# Patient Record
Sex: FEMALE | Race: WHITE | NOT HISPANIC OR LATINO
[De-identification: names, ages, dates, MRNs, and addresses within clinical notes are randomized per-mention and may not be internally consistent; named-entity substitution may affect disease eponyms.]

---

## 2017-03-30 ENCOUNTER — APPOINTMENT (OUTPATIENT)
Dept: VASCULAR SURGERY | Facility: CLINIC | Age: 47
End: 2017-03-30

## 2017-03-30 VITALS
WEIGHT: 232 LBS | BODY MASS INDEX: 38.65 KG/M2 | HEART RATE: 83 BPM | TEMPERATURE: 98.6 F | SYSTOLIC BLOOD PRESSURE: 110 MMHG | DIASTOLIC BLOOD PRESSURE: 75 MMHG | HEIGHT: 65 IN

## 2017-07-23 ENCOUNTER — INPATIENT (INPATIENT)
Facility: HOSPITAL | Age: 47
LOS: 3 days | Discharge: HOME | DRG: 872 | End: 2017-07-27
Attending: INTERNAL MEDICINE | Admitting: INTERNAL MEDICINE
Payer: COMMERCIAL

## 2017-07-23 VITALS
TEMPERATURE: 99 F | RESPIRATION RATE: 19 BRPM | HEART RATE: 58 BPM | DIASTOLIC BLOOD PRESSURE: 84 MMHG | OXYGEN SATURATION: 100 % | SYSTOLIC BLOOD PRESSURE: 125 MMHG

## 2017-07-23 DIAGNOSIS — K50.00 CROHN'S DISEASE OF SMALL INTESTINE WITHOUT COMPLICATIONS: ICD-10-CM

## 2017-07-23 DIAGNOSIS — Z86.39 PERSONAL HISTORY OF OTHER ENDOCRINE, NUTRITIONAL AND METABOLIC DISEASE: Chronic | ICD-10-CM

## 2017-07-23 DIAGNOSIS — A09 INFECTIOUS GASTROENTERITIS AND COLITIS, UNSPECIFIED: ICD-10-CM

## 2017-07-23 DIAGNOSIS — R10.9 UNSPECIFIED ABDOMINAL PAIN: ICD-10-CM

## 2017-07-23 DIAGNOSIS — K21.9 GASTRO-ESOPHAGEAL REFLUX DISEASE WITHOUT ESOPHAGITIS: ICD-10-CM

## 2017-07-23 DIAGNOSIS — Z98.89 OTHER SPECIFIED POSTPROCEDURAL STATES: Chronic | ICD-10-CM

## 2017-07-23 DIAGNOSIS — A41.9 SEPSIS, UNSPECIFIED ORGANISM: ICD-10-CM

## 2017-07-23 LAB
ALBUMIN SERPL ELPH-MCNC: 4.2 G/DL — SIGNIFICANT CHANGE UP (ref 3.3–5)
ALP SERPL-CCNC: 90 U/L — SIGNIFICANT CHANGE UP (ref 40–120)
ALT FLD-CCNC: 17 U/L RC — SIGNIFICANT CHANGE UP (ref 10–45)
ANION GAP SERPL CALC-SCNC: 16 MMOL/L — SIGNIFICANT CHANGE UP (ref 5–17)
APPEARANCE UR: CLEAR — SIGNIFICANT CHANGE UP
APTT BLD: 30.5 SEC — SIGNIFICANT CHANGE UP (ref 27.5–37.4)
AST SERPL-CCNC: 21 U/L — SIGNIFICANT CHANGE UP (ref 10–40)
BASOPHILS # BLD AUTO: 0 K/UL — SIGNIFICANT CHANGE UP (ref 0–0.2)
BASOPHILS NFR BLD AUTO: 0.4 % — SIGNIFICANT CHANGE UP (ref 0–2)
BILIRUB SERPL-MCNC: 0.4 MG/DL — SIGNIFICANT CHANGE UP (ref 0.2–1.2)
BILIRUB UR-MCNC: NEGATIVE — SIGNIFICANT CHANGE UP
BUN SERPL-MCNC: 10 MG/DL — SIGNIFICANT CHANGE UP (ref 7–23)
C DIFF GDH STL QL: NEGATIVE — SIGNIFICANT CHANGE UP
C DIFF GDH STL QL: SIGNIFICANT CHANGE UP
CALCIUM SERPL-MCNC: 9.2 MG/DL — SIGNIFICANT CHANGE UP (ref 8.4–10.5)
CHLORIDE SERPL-SCNC: 102 MMOL/L — SIGNIFICANT CHANGE UP (ref 96–108)
CO2 SERPL-SCNC: 23 MMOL/L — SIGNIFICANT CHANGE UP (ref 22–31)
COLOR SPEC: YELLOW — SIGNIFICANT CHANGE UP
CREAT SERPL-MCNC: 0.88 MG/DL — SIGNIFICANT CHANGE UP (ref 0.5–1.3)
DIFF PNL FLD: NEGATIVE — SIGNIFICANT CHANGE UP
EOSINOPHIL # BLD AUTO: 0 K/UL — SIGNIFICANT CHANGE UP (ref 0–0.5)
EOSINOPHIL NFR BLD AUTO: 0.2 % — SIGNIFICANT CHANGE UP (ref 0–6)
GAS PNL BLDV: SIGNIFICANT CHANGE UP
GLUCOSE SERPL-MCNC: 122 MG/DL — HIGH (ref 70–99)
GLUCOSE UR QL: NEGATIVE — SIGNIFICANT CHANGE UP
HCT VFR BLD CALC: 42 % — SIGNIFICANT CHANGE UP (ref 34.5–45)
HGB BLD-MCNC: 14.6 G/DL — SIGNIFICANT CHANGE UP (ref 11.5–15.5)
INR BLD: 1.1 RATIO — SIGNIFICANT CHANGE UP (ref 0.88–1.16)
KETONES UR-MCNC: NEGATIVE — SIGNIFICANT CHANGE UP
LEUKOCYTE ESTERASE UR-ACNC: NEGATIVE — SIGNIFICANT CHANGE UP
LIDOCAIN IGE QN: 28 U/L — SIGNIFICANT CHANGE UP (ref 7–60)
LYMPHOCYTES # BLD AUTO: 1.6 K/UL — SIGNIFICANT CHANGE UP (ref 1–3.3)
LYMPHOCYTES # BLD AUTO: 13.4 % — SIGNIFICANT CHANGE UP (ref 13–44)
MAGNESIUM SERPL-MCNC: 1.8 MG/DL — SIGNIFICANT CHANGE UP (ref 1.6–2.6)
MCHC RBC-ENTMCNC: 31 PG — SIGNIFICANT CHANGE UP (ref 27–34)
MCHC RBC-ENTMCNC: 34.7 GM/DL — SIGNIFICANT CHANGE UP (ref 32–36)
MCV RBC AUTO: 89.3 FL — SIGNIFICANT CHANGE UP (ref 80–100)
MONOCYTES # BLD AUTO: 0.6 K/UL — SIGNIFICANT CHANGE UP (ref 0–0.9)
MONOCYTES NFR BLD AUTO: 5 % — SIGNIFICANT CHANGE UP (ref 2–14)
NEUTROPHILS # BLD AUTO: 9.4 K/UL — HIGH (ref 1.8–7.4)
NEUTROPHILS NFR BLD AUTO: 81 % — HIGH (ref 43–77)
NITRITE UR-MCNC: NEGATIVE — SIGNIFICANT CHANGE UP
PH UR: 6 — SIGNIFICANT CHANGE UP (ref 5–8)
PHOSPHATE SERPL-MCNC: 3.4 MG/DL — SIGNIFICANT CHANGE UP (ref 2.5–4.5)
PLATELET # BLD AUTO: 212 K/UL — SIGNIFICANT CHANGE UP (ref 150–400)
POTASSIUM SERPL-MCNC: 3.9 MMOL/L — SIGNIFICANT CHANGE UP (ref 3.5–5.3)
POTASSIUM SERPL-SCNC: 3.9 MMOL/L — SIGNIFICANT CHANGE UP (ref 3.5–5.3)
PROT SERPL-MCNC: 7.2 G/DL — SIGNIFICANT CHANGE UP (ref 6–8.3)
PROT UR-MCNC: SIGNIFICANT CHANGE UP
PROTHROM AB SERPL-ACNC: 11.9 SEC — SIGNIFICANT CHANGE UP (ref 9.8–12.7)
RAPID RVP RESULT: SIGNIFICANT CHANGE UP
RBC # BLD: 4.7 M/UL — SIGNIFICANT CHANGE UP (ref 3.8–5.2)
RBC # FLD: 11.8 % — SIGNIFICANT CHANGE UP (ref 10.3–14.5)
SODIUM SERPL-SCNC: 141 MMOL/L — SIGNIFICANT CHANGE UP (ref 135–145)
SP GR SPEC: 1.02 — SIGNIFICANT CHANGE UP (ref 1.01–1.02)
UROBILINOGEN FLD QL: NEGATIVE — SIGNIFICANT CHANGE UP
WBC # BLD: 11.6 K/UL — HIGH (ref 3.8–10.5)
WBC # FLD AUTO: 11.6 K/UL — HIGH (ref 3.8–10.5)

## 2017-07-23 PROCEDURE — 99285 EMERGENCY DEPT VISIT HI MDM: CPT

## 2017-07-23 PROCEDURE — 99223 1ST HOSP IP/OBS HIGH 75: CPT

## 2017-07-23 PROCEDURE — 74176 CT ABD & PELVIS W/O CONTRAST: CPT | Mod: 26

## 2017-07-23 RX ORDER — PANTOPRAZOLE SODIUM 20 MG/1
40 TABLET, DELAYED RELEASE ORAL
Qty: 0 | Refills: 0 | Status: DISCONTINUED | OUTPATIENT
Start: 2017-07-23 | End: 2017-07-27

## 2017-07-23 RX ORDER — ONDANSETRON 8 MG/1
4 TABLET, FILM COATED ORAL EVERY 6 HOURS
Qty: 0 | Refills: 0 | Status: DISCONTINUED | OUTPATIENT
Start: 2017-07-23 | End: 2017-07-27

## 2017-07-23 RX ORDER — MORPHINE SULFATE 50 MG/1
4 CAPSULE, EXTENDED RELEASE ORAL ONCE
Qty: 0 | Refills: 0 | Status: DISCONTINUED | OUTPATIENT
Start: 2017-07-23 | End: 2017-07-23

## 2017-07-23 RX ORDER — ACETAMINOPHEN 500 MG
1000 TABLET ORAL ONCE
Qty: 0 | Refills: 0 | Status: COMPLETED | OUTPATIENT
Start: 2017-07-23 | End: 2017-07-23

## 2017-07-23 RX ORDER — SODIUM CHLORIDE 9 MG/ML
1000 INJECTION INTRAMUSCULAR; INTRAVENOUS; SUBCUTANEOUS ONCE
Qty: 0 | Refills: 0 | Status: COMPLETED | OUTPATIENT
Start: 2017-07-23 | End: 2017-07-23

## 2017-07-23 RX ORDER — HEPARIN SODIUM 5000 [USP'U]/ML
5000 INJECTION INTRAVENOUS; SUBCUTANEOUS EVERY 12 HOURS
Qty: 0 | Refills: 0 | Status: DISCONTINUED | OUTPATIENT
Start: 2017-07-23 | End: 2017-07-27

## 2017-07-23 RX ORDER — CIPROFLOXACIN LACTATE 400MG/40ML
400 VIAL (ML) INTRAVENOUS EVERY 12 HOURS
Qty: 0 | Refills: 0 | Status: DISCONTINUED | OUTPATIENT
Start: 2017-07-23 | End: 2017-07-27

## 2017-07-23 RX ORDER — ACETAMINOPHEN 500 MG
650 TABLET ORAL EVERY 6 HOURS
Qty: 0 | Refills: 0 | Status: DISCONTINUED | OUTPATIENT
Start: 2017-07-23 | End: 2017-07-27

## 2017-07-23 RX ORDER — METRONIDAZOLE 500 MG
500 TABLET ORAL EVERY 8 HOURS
Qty: 0 | Refills: 0 | Status: DISCONTINUED | OUTPATIENT
Start: 2017-07-23 | End: 2017-07-27

## 2017-07-23 RX ORDER — SODIUM CHLORIDE 9 MG/ML
1000 INJECTION INTRAMUSCULAR; INTRAVENOUS; SUBCUTANEOUS
Qty: 0 | Refills: 0 | Status: DISCONTINUED | OUTPATIENT
Start: 2017-07-23 | End: 2017-07-26

## 2017-07-23 RX ORDER — CIPROFLOXACIN LACTATE 400MG/40ML
400 VIAL (ML) INTRAVENOUS ONCE
Qty: 0 | Refills: 0 | Status: COMPLETED | OUTPATIENT
Start: 2017-07-23 | End: 2017-07-23

## 2017-07-23 RX ORDER — METRONIDAZOLE 500 MG
500 TABLET ORAL ONCE
Qty: 0 | Refills: 0 | Status: COMPLETED | OUTPATIENT
Start: 2017-07-23 | End: 2017-07-23

## 2017-07-23 RX ORDER — SODIUM CHLORIDE 9 MG/ML
3 INJECTION INTRAMUSCULAR; INTRAVENOUS; SUBCUTANEOUS ONCE
Qty: 0 | Refills: 0 | Status: COMPLETED | OUTPATIENT
Start: 2017-07-23 | End: 2017-07-23

## 2017-07-23 RX ADMIN — Medication 100 MILLIGRAM(S): at 19:18

## 2017-07-23 RX ADMIN — SODIUM CHLORIDE 125 MILLILITER(S): 9 INJECTION INTRAMUSCULAR; INTRAVENOUS; SUBCUTANEOUS at 18:56

## 2017-07-23 RX ADMIN — SODIUM CHLORIDE 1000 MILLILITER(S): 9 INJECTION INTRAMUSCULAR; INTRAVENOUS; SUBCUTANEOUS at 15:29

## 2017-07-23 RX ADMIN — SODIUM CHLORIDE 1000 MILLILITER(S): 9 INJECTION INTRAMUSCULAR; INTRAVENOUS; SUBCUTANEOUS at 22:30

## 2017-07-23 RX ADMIN — Medication 200 MILLIGRAM(S): at 16:29

## 2017-07-23 RX ADMIN — Medication 400 MILLIGRAM(S): at 17:30

## 2017-07-23 RX ADMIN — ONDANSETRON 4 MILLIGRAM(S): 8 TABLET, FILM COATED ORAL at 22:30

## 2017-07-23 RX ADMIN — SODIUM CHLORIDE 3 MILLILITER(S): 9 INJECTION INTRAMUSCULAR; INTRAVENOUS; SUBCUTANEOUS at 16:29

## 2017-07-23 RX ADMIN — MORPHINE SULFATE 4 MILLIGRAM(S): 50 CAPSULE, EXTENDED RELEASE ORAL at 22:56

## 2017-07-23 RX ADMIN — Medication 1000 MILLIGRAM(S): at 19:23

## 2017-07-23 RX ADMIN — SODIUM CHLORIDE 1000 MILLILITER(S): 9 INJECTION INTRAMUSCULAR; INTRAVENOUS; SUBCUTANEOUS at 16:29

## 2017-07-23 NOTE — H&P ADULT - PSH
H/O colonoscopy    H/O endoscopy  upper GI  History of cholecystectomy  lap 3/2010  History of pituitary adenoma  s/p resection 2014

## 2017-07-23 NOTE — ED PROVIDER NOTE - MEDICAL DECISION MAKING DETAILS
Dr. Hampton Note: concern for infectious complications with crohn's, check ct abd for abscess, concern for c.dif given sxs and clinda use, check stools and place in isolation

## 2017-07-23 NOTE — H&P ADULT - NSHPREVIEWOFSYSTEMS_GEN_ALL_CORE
REVIEW OF SYSTEMS:    CONSTITUTIONAL: diffuse weakness, +fevers  EYES/ENT: no oral sores/throat pain   RESPIRATORY: No cough, wheezing, hemoptysis; No shortness of breath  CARDIOVASCULAR: No chest pain or palpitations  GASTROINTESTINAL: + diffuse abdominal or epigastric pain. +nausea, No vomiting, or hematemesis;+ diarrhea. No melena or hematochezia.  GENITOURINARY: No dysuria, frequency or hematuria  NEUROLOGICAL: No numbness or focal weakness  MSK: + joint pain (knees and hips)  SKIN: No itching, rashes  PSYCH: No depression

## 2017-07-23 NOTE — H&P ADULT - PMH
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

## 2017-07-23 NOTE — H&P ADULT - HISTORY OF PRESENT ILLNESS
Ms Sanchez is a 47 year old woman with history of Crohn disease (dx 2012 with ileal disease and anal fissure, no surgery, on Remicaid), pituitary adenoma s/p resection 10/14 c/b RUE arterial thrombus s/p thrombectomy, fibromyalgia presents with 1 day fever, abdominal pain, diarrhea. Patient has chronic mild r sided abdominal discomfort but yesterday noted lower abdominal cramping and today noted worsening moderate abdominal tenderness throughout, more so in the LLQ with abdominal fullness but no pain per se when not touched, no alleviating or exacerbating factors. Today she also had fever to 101, 6 episodes of watery diarrhea without blood/melena/mucus. She has nausea without vomiting and reduced PO intake. She also complains of diffuse body aches more so in her back, predominantly left upper back, diffuse knee/hip arthralgias without swelling/redness.   She recently completed a course of clindamycin for 17 days for strep throat.   Her Crohn disease was diagnosed in 2012 and she has been on Remicaid since 2014. She was due to remicaid infusion July 14th but this was skipped due to active strep infection.   In the ED VS: Tmax 101.6, , /81, 100% on RA

## 2017-07-23 NOTE — H&P ADULT - NSHPPHYSICALEXAM_GEN_ALL_CORE
PHYSICAL EXAM:  Vital Signs Last 24 Hrs  T(C): 37.9 (07-23-17 @ 19:19)  T(F): 100.3 (07-23-17 @ 19:19), Max: 101.6 (07-23-17 @ 17:22)  HR: 108 (07-23-17 @ 19:19) (58 - 108)  BP: 129/81 (07-23-17 @ 19:19)  BP(mean): --  RR: 20 (07-23-17 @ 19:19) (19 - 20)  SpO2: 97% (07-23-17 @ 19:19) (97% - 100%)  Wt(kg): --    Constitutional: NAD, awake and alert  EYES: EOMI, PERRL  ENT:  Normal Hearing, no tonsillar exudates, no oral sores  Neck: Soft and supple  Respiratory: Breath sounds are clear bilaterally, No wheezing, rales or rhonchi  Cardiovascular: +tachycardia, S1 and S2, regular rate and rhythm, no Murmurs, gallops or rubs  Gastrointestinal: Bowel Sounds present, soft, mild tenderness throughout, more so in LLQ, nondistended, no guarding, no rebound, negtive [place]  Extremities: No cyanosis or clubbing; warm to touch  Vascular: 2+ peripheral pulses lower ex  Neurological: A/O x 3, no focal deficits, 5/5 strength b/l upper and lower extremities  Musculoskeletal: no joint erythema, swelling, tenderness  Skin: No rashes  Psych: no depression or anhedonia  HEME: no bruises, no nose bleeds

## 2017-07-23 NOTE — H&P ADULT - ASSESSMENT
47 year old woman with Crohn disease presents with abdominal pain, diarrhea, fever after finishing antibiotic course.

## 2017-07-23 NOTE — H&P ADULT - FAMILY HISTORY
<<-----Click on this checkbox to enter Family History Family history of coronary artery disease     Father  Still living? Unknown  Family history of diabetes mellitus, Age at diagnosis: Age Unknown  Family history of hypertension, Age at diagnosis: Age Unknown     Mother  Still living? Unknown  Family history of hypertension, Age at diagnosis: Age Unknown

## 2017-07-23 NOTE — ED PROVIDER NOTE - OBJECTIVE STATEMENT
Dr. Hampton Note: 47F with diffuse ab pain, fever, and worsening diarrhea with watery stools today.  On Remicade for Crohn's and recently completed Clindamycin for strep.  No respiratory sxs, no headache, vomiting, cp, sob.  Onset of fever today, up to 101F, better after Tylenol taken today. Dr. Hampton Note: 47F with diffuse ab pain, fever, and worsening diarrhea with watery stools today.  On Remicade for Crohn's and recently completed Clindamycin for strep.  No respiratory sxs, no headache, vomiting, cp, sob.  Onset of fever today, up to 101F, better after Tylenol taken today.    PCP - Aubrey Ocampo ([facility] [place])

## 2017-07-23 NOTE — ED ADULT NURSE NOTE - PMH
Benign neoplasm of pituitary gland    Breast nodule  right breast   been monitored   yearly sonogram  Crohn disease  dx 2012  Gastro - Esophageal Reflux Disease    H/O hypercholesterolemia  no medications  controlled with diet and exercise  Plantar fasciitis  bilateral  on therapy  Temporal mandibular joint disorder

## 2017-07-23 NOTE — H&P ADULT - PROBLEM SELECTOR PLAN 1
With fever, tachycardia, leukocytosis, most likely 2/2 GI infection in setting of Crohn disease.   -s/p 2L NS, hemodynamically stable  -Less likely to be Crohn flair as she has predominantly ileal disease with R sided discomfort, but today she presents with L sided pain and CT with colonic involvement.  However, may be Crohn flair as she missed a dose of Remicaid. Concern for C dif with IBD and recent abx use. Unfortunately, unable to receive IV contrast to r/o abscess due to allergy.  -s/p cipro/flagyl in the ED, continue for now.   -f/u C dif, stool O&P  -f/u blood cultures  -RVP negative

## 2017-07-24 DIAGNOSIS — A09 INFECTIOUS GASTROENTERITIS AND COLITIS, UNSPECIFIED: ICD-10-CM

## 2017-07-24 LAB
ALBUMIN SERPL ELPH-MCNC: 3.6 G/DL — SIGNIFICANT CHANGE UP (ref 3.3–5)
ALP SERPL-CCNC: 74 U/L — SIGNIFICANT CHANGE UP (ref 40–120)
ALT FLD-CCNC: 11 U/L — SIGNIFICANT CHANGE UP (ref 10–45)
ANION GAP SERPL CALC-SCNC: 16 MMOL/L — SIGNIFICANT CHANGE UP (ref 5–17)
AST SERPL-CCNC: 17 U/L — SIGNIFICANT CHANGE UP (ref 10–40)
BASOPHILS # BLD AUTO: 0.01 K/UL — SIGNIFICANT CHANGE UP (ref 0–0.2)
BASOPHILS NFR BLD AUTO: 0.1 % — SIGNIFICANT CHANGE UP (ref 0–2)
BILIRUB SERPL-MCNC: 0.4 MG/DL — SIGNIFICANT CHANGE UP (ref 0.2–1.2)
BUN SERPL-MCNC: 6 MG/DL — LOW (ref 7–23)
CALCIUM SERPL-MCNC: 8.2 MG/DL — LOW (ref 8.4–10.5)
CHLORIDE SERPL-SCNC: 106 MMOL/L — SIGNIFICANT CHANGE UP (ref 96–108)
CO2 SERPL-SCNC: 21 MMOL/L — LOW (ref 22–31)
CREAT SERPL-MCNC: 0.81 MG/DL — SIGNIFICANT CHANGE UP (ref 0.5–1.3)
CRP SERPL-MCNC: 1.1 MG/DL — HIGH (ref 0–0.4)
EOSINOPHIL # BLD AUTO: 0 K/UL — SIGNIFICANT CHANGE UP (ref 0–0.5)
EOSINOPHIL NFR BLD AUTO: 0 % — SIGNIFICANT CHANGE UP (ref 0–6)
FOLATE SERPL-MCNC: >20 NG/ML — SIGNIFICANT CHANGE UP (ref 4.8–24.2)
GLUCOSE SERPL-MCNC: 106 MG/DL — HIGH (ref 70–99)
HCT VFR BLD CALC: 35.8 % — SIGNIFICANT CHANGE UP (ref 34.5–45)
HGB BLD-MCNC: 12.1 G/DL — SIGNIFICANT CHANGE UP (ref 11.5–15.5)
IMM GRANULOCYTES NFR BLD AUTO: 0.2 % — SIGNIFICANT CHANGE UP (ref 0–1.5)
LACTATE SERPL-SCNC: 0.8 MMOL/L — SIGNIFICANT CHANGE UP (ref 0.7–2)
LYMPHOCYTES # BLD AUTO: 1.07 K/UL — SIGNIFICANT CHANGE UP (ref 1–3.3)
LYMPHOCYTES # BLD AUTO: 11 % — LOW (ref 13–44)
MAGNESIUM SERPL-MCNC: 1.6 MG/DL — SIGNIFICANT CHANGE UP (ref 1.6–2.6)
MCHC RBC-ENTMCNC: 29.1 PG — SIGNIFICANT CHANGE UP (ref 27–34)
MCHC RBC-ENTMCNC: 33.8 GM/DL — SIGNIFICANT CHANGE UP (ref 32–36)
MCV RBC AUTO: 86.1 FL — SIGNIFICANT CHANGE UP (ref 80–100)
MONOCYTES # BLD AUTO: 0.77 K/UL — SIGNIFICANT CHANGE UP (ref 0–0.9)
MONOCYTES NFR BLD AUTO: 7.9 % — SIGNIFICANT CHANGE UP (ref 2–14)
NEUTROPHILS # BLD AUTO: 7.85 K/UL — HIGH (ref 1.8–7.4)
NEUTROPHILS NFR BLD AUTO: 80.8 % — HIGH (ref 43–77)
PHOSPHATE SERPL-MCNC: 2.6 MG/DL — SIGNIFICANT CHANGE UP (ref 2.5–4.5)
PLATELET # BLD AUTO: 171 K/UL — SIGNIFICANT CHANGE UP (ref 150–400)
POTASSIUM SERPL-MCNC: 3.7 MMOL/L — SIGNIFICANT CHANGE UP (ref 3.5–5.3)
POTASSIUM SERPL-SCNC: 3.7 MMOL/L — SIGNIFICANT CHANGE UP (ref 3.5–5.3)
PROT SERPL-MCNC: 6.4 G/DL — SIGNIFICANT CHANGE UP (ref 6–8.3)
RBC # BLD: 4.16 M/UL — SIGNIFICANT CHANGE UP (ref 3.8–5.2)
RBC # FLD: 13.2 % — SIGNIFICANT CHANGE UP (ref 10.3–14.5)
SODIUM SERPL-SCNC: 143 MMOL/L — SIGNIFICANT CHANGE UP (ref 135–145)
VIT B12 SERPL-MCNC: 402 PG/ML — SIGNIFICANT CHANGE UP (ref 243–894)
WBC # BLD: 9.72 K/UL — SIGNIFICANT CHANGE UP (ref 3.8–10.5)
WBC # FLD AUTO: 9.72 K/UL — SIGNIFICANT CHANGE UP (ref 3.8–10.5)

## 2017-07-24 RX ORDER — POTASSIUM CHLORIDE 20 MEQ
20 PACKET (EA) ORAL ONCE
Qty: 0 | Refills: 0 | Status: COMPLETED | OUTPATIENT
Start: 2017-07-24 | End: 2017-07-24

## 2017-07-24 RX ADMIN — ONDANSETRON 4 MILLIGRAM(S): 8 TABLET, FILM COATED ORAL at 13:04

## 2017-07-24 RX ADMIN — MORPHINE SULFATE 4 MILLIGRAM(S): 50 CAPSULE, EXTENDED RELEASE ORAL at 00:37

## 2017-07-24 RX ADMIN — Medication 100 MILLIGRAM(S): at 07:10

## 2017-07-24 RX ADMIN — Medication 650 MILLIGRAM(S): at 00:38

## 2017-07-24 RX ADMIN — Medication 650 MILLIGRAM(S): at 19:51

## 2017-07-24 RX ADMIN — Medication 100 MILLIGRAM(S): at 15:07

## 2017-07-24 RX ADMIN — ONDANSETRON 4 MILLIGRAM(S): 8 TABLET, FILM COATED ORAL at 19:05

## 2017-07-24 RX ADMIN — Medication 650 MILLIGRAM(S): at 13:04

## 2017-07-24 RX ADMIN — ONDANSETRON 4 MILLIGRAM(S): 8 TABLET, FILM COATED ORAL at 06:50

## 2017-07-24 RX ADMIN — Medication 650 MILLIGRAM(S): at 06:55

## 2017-07-24 RX ADMIN — Medication 20 MILLIEQUIVALENT(S): at 17:42

## 2017-07-24 RX ADMIN — Medication 100 MILLIGRAM(S): at 17:42

## 2017-07-24 RX ADMIN — HEPARIN SODIUM 5000 UNIT(S): 5000 INJECTION INTRAVENOUS; SUBCUTANEOUS at 17:42

## 2017-07-24 RX ADMIN — SODIUM CHLORIDE 125 MILLILITER(S): 9 INJECTION INTRAMUSCULAR; INTRAVENOUS; SUBCUTANEOUS at 07:15

## 2017-07-24 RX ADMIN — PANTOPRAZOLE SODIUM 40 MILLIGRAM(S): 20 TABLET, DELAYED RELEASE ORAL at 10:19

## 2017-07-24 RX ADMIN — Medication 100 MILLIGRAM(S): at 05:25

## 2017-07-24 RX ADMIN — SODIUM CHLORIDE 125 MILLILITER(S): 9 INJECTION INTRAMUSCULAR; INTRAVENOUS; SUBCUTANEOUS at 17:43

## 2017-07-24 NOTE — CONSULT NOTE ADULT - PROBLEM SELECTOR RECOMMENDATION 9
cdiff neg  would continue cipro and flagyl   follow stool cultures  GI follow up.  follow up blood cultures

## 2017-07-24 NOTE — ED ADULT NURSE REASSESSMENT NOTE - NS ED NURSE REASSESS COMMENT FT1
Report received from Dominga KOVACS. Pt AAOx4, NAD, resp nonlabored, skin warm/dry, resting comfortably in bed. Pt c/o mild nausea, no vomiting. Pt has only had one episode of loose stool in ED, approx 2 hours ago, no diarrhea at this time. Pt reports feeling mild generalized weakness, improved from before. No abdominal pain, pt tolerating PO water and ice chips. Pt denies headache, dizziness, chest pain, palpitations, SOB, abd pain, v/d, urinary symptoms, fevers, chills at this time. Pt awaiting bed. Safety maintained. Report received from Dominga KOVACS. Pt AAOx4, NAD, resp nonlabored, skin warm/dry, resting comfortably in bed. Pt c/o mild nausea, no vomiting. Pt has only had one episode of loose stool in ED, approx 2 hours ago, no diarrhea at this time. Pt reports feeling mild generalized weakness, improved from before. No abdominal pain, pt tolerating PO water and ice chips. Pt denies headache, dizziness, chest pain, palpitations, SOB, abd pain, v/d, urinary symptoms, fevers, chills at this time. Pt awaiting bed. Safety maintained.    0215: No changes observed, pt asleep in room with eyes closed, arouses to voice, AAOx4, NAD, resting comfortably in bed, skin warm/dry, pt awaiting bed, comfort measures provided. Report given to ED Holding BETHANIE Pizarro. Safety maintained.

## 2017-07-24 NOTE — CONSULT NOTE ADULT - SUBJECTIVE AND OBJECTIVE BOX
SUBJECTIVE:  47yFemale with known Crohn's ileitis on Remicade monotherapy every 8 weeks now p/w 48 hours of profuse diarrhea, vomiting, fever to 101 at home.  No known sick contacts but ate out Friday and Saturday.  Recently on antibiotics for Strep throat (which also required her to miss last Remicade infusion).  No blood in the stool.  No extra-intestinal IBD manifestations.  No weight loss, fatigue, joint pains, rashes.      ______________________________________________________________________  PMH/PSH:  PAST MEDICAL & SURGICAL HISTORY:  Thrombus due to any device, implant or graft, sequela: RUE arterial thrombus, complication of pituitary adenoma resection 2014, s/p thrombectomy at the time  Fibromyalgia  Temporal mandibular joint disorder  Plantar fasciitis: bilateral  on therapy  Breast nodule: right breast   been monitored   yearly sonogram  H/O hypercholesterolemia: no medications  controlled with diet and exercise  Crohn disease: dx 2012  Benign neoplasm of pituitary gland  Gastro - Esophageal Reflux Disease  History of pituitary adenoma: s/p resection 2014  H/O endoscopy: upper GI  H/O colonoscopy  History of cholecystectomy: chapis 3/2010    ______________________________________________________________________  MEDS:  MEDICATIONS  (STANDING):  sodium chloride 0.9%. 1000 milliLiter(s) (125 mL/Hr) IV Continuous <Continuous>  pantoprazole    Tablet 40 milliGRAM(s) Oral before breakfast  heparin  Injectable 5000 Unit(s) SubCutaneous every 12 hours  sodium chloride 0.9%. 1000 milliLiter(s) (1000 mL/Hr) IV Continuous <Continuous>  ciprofloxacin   IVPB 400 milliGRAM(s) IV Intermittent every 12 hours  metroNIDAZOLE  IVPB 500 milliGRAM(s) IV Intermittent every 8 hours    MEDICATIONS  (PRN):  acetaminophen   Tablet 650 milliGRAM(s) Oral every 6 hours PRN For Temp greater than 38 C (100.4 F)  ondansetron Injectable 4 milliGRAM(s) IV Push every 6 hours PRN Nausea    ______________________________________________________________________  ALL:   Allergies    Apriso (Fever)  Asacol (Fever)  ceftin and medications she does not know what name is (Unknown)  contrast media (iodine-based) (Other)  Medrol Dosepak (Other)    Intolerances    adhesives (Other)    ______________________________________________________________________  SH:  ______________________________________________________________________  FH:  FAMILY HISTORY:  Family history of hypertension (Father, Mother)  Family history of diabetes mellitus (Father)  Family history of coronary artery disease    ______________________________________________________________________  ROS:    CONSTITUTIONAL:  No weight loss, fever, chills, weakness or fatigue.    HEENT:  Eyes:  No visual loss, blurred vision, double vision or yellow sclerae. Ears, Nose, Throat:  No hearing loss, sneezing, congestion, runny nose or sore throat.    SKIN:  No rash or itching.    CARDIOVASCULAR:  No chest pain, chest pressure or chest discomfort. No palpitations or edema.    RESPIRATORY:  No shortness of breath, cough or sputum.    GASTROINTESTINAL:  SEE HPI    GENITOURINARY:  No dysuria, hematuria, urinary frequency    NEUROLOGICAL:  No headache, dizziness, syncope, paralysis, ataxia, numbness or tingling in the extremities. No change in bowel or bladder control.    MUSCULOSKELETAL:  No muscle, back pain, joint pain or stiffness.    HEMATOLOGIC:  No anemia, bleeding or bruising.    LYMPHATICS:  No enlarged nodes. No history of splenectomy.    PSYCHIATRIC:  No history of depression or anxiety.    ENDOCRINOLOGIC:  No reports of sweating, cold or heat intolerance. No polyuria or polydipsia.    ALLERGIES:  No history of asthma, hives, eczema or rhinitis.  ______________________________________________________________________  PHYSICAL EXAM:  T(C): 37 (07-24-17 @ 18:08), Max: 39.1 (07-24-17 @ 00:39)  HR: 86 (07-24-17 @ 18:08)  BP: 111/75 (07-24-17 @ 18:08)  RR: 18 (07-24-17 @ 18:08)  SpO2: 99% (07-24-17 @ 18:08)  Wt(kg): --    07-24 - 07-24  --------------------------------------------------------  IN:    Oral Fluid: 120 mL    sodium chloride 0.9%.: 250 mL  Total IN: 370 mL    OUT:  Total OUT: 0 mL    Total NET: 370 mL        PHYSICAL EXAM:      Constitutional: NAD, anxious    Eyes: PERRLA    ENMT: MMM    Neck: no LAD/JVD    Back: no CVAT    Respiratory: CTA    Cardiovascular: RR, S1/S2 normal    Gastrointestinal: soft, ND, +diffuse tenderness without rebound or guarding, no HSM    Extremities: no C/C    Skin: no rashes    Lymph Nodes: no LAD    Psychiatric: A&O x 3      ______________________________________________________________________  LABS:                        12.1   9.72  )-----------( 171      ( 24 Jul 2017 07:46 )             35.8     07-24    143  |  106  |  6<L>  ----------------------------<  106<H>  3.7   |  21<L>  |  0.81    Ca    8.2<L>      24 Jul 2017 07:41  Phos  2.6     07-24  Mg     1.6     07-24    TPro  6.4  /  Alb  3.6  /  TBili  0.4  /  DBili  x   /  AST  17  /  ALT  11  /  AlkPhos  74  07-24    LIVER FUNCTIONS - ( 24 Jul 2017 07:41 )  Alb: 3.6 g/dL / Pro: 6.4 g/dL / ALK PHOS: 74 U/L / ALT: 11 U/L / AST: 17 U/L / GGT: x         C.diff PCR NEGATIVE      ______________________________________________________________________  IMAGING:  PROCEDURE DATE:  07/23/2017      INTERPRETATION:  CLINICAL INFORMATION: History of Crohn's. Abdominal   pain, fever and diarrhea.    COMPARISON: CT Abdomen/pelvis on 6/7/2014.    PROCEDURE:   CT of the Abdomen and Pelvis was performed without intravenous contrast.   Intravenous contrast: None.  Oral contrast: positive contrast was administered.  Sagittal and coronal reformats were performed.    FINDINGS:     LOWER CHEST: Within normal limits.    Evaluation of the abdominal viscera is limited without IV contrast.    LIVER: Subcentimeter hypodense lesion too small to characterize. Normal   morphology.  BILE DUCTS: Normal caliber.  GALLBLADDER: Status post cholecystectomy.  SPLEEN: Within normal limits, splenule noted.  PANCREAS: Within normal limits.  ADRENALS: Within normal limits.  KIDNEYS/URETERS: Within normal limits.    BLADDER: Within normal limits.  REPRODUCTIVE ORGANS: The uterus and adnexa are within normal limits.    BOWEL: There is wall thickening with submucosal edema with minimal fat   stranding involving the ascending colon. No bowel obstruction. Appendix   is normal.  PERITONEUM: No ascites.  VESSELS:  Within normal limits.  RETROPERITONEUM: Multiple subcentimeter mesenteric and retroperitoneal   lymph nodes. No abscess collection.   ABDOMINAL WALL: The uterus and right ovary appear unremarkable. There is   a 2.2 cm left-sided ovarian cyst.  BONES: Within normal limits.    IMPRESSION: Limited evaluation without IV contrast.    Mild right-sided colitis with multiple subcentimeter   mesenteric/retroperitoneal lymph nodes, likely reactive. Given the   patient's history of Crohn's disease, this is consistent with an acute   flare.    No intra-abdominal abscess collection.      ______________________________________________________________________  ASSESSMENT:  47y Female WITH Crohn's ileitis on Remicade now p/w fever, vomiting, diarrhea, diffuse abdominal pain and CT finding of right sided colitis.  Patient's last colonoscopy did NOT show any colonic involvement from IBD standpoint - I suspect this acute presentation is likely infectious in nature.  Fortunately, C.diff has been ruled out.    PLAN:  1.  Agree with IV Cipro/Flagyl  2.  Stool cultures are pending  3.  Advance diet as tolerated  4.  DVT PPX, OOB  5.  Will follow        Rich Sutherland M.D.  [facility]  O) [phone]

## 2017-07-24 NOTE — CONSULT NOTE ADULT - SUBJECTIVE AND OBJECTIVE BOX
[facility], Division of Infectious Diseases  JANELL Cantrell A. Lee  [phone]    SADIA MARTINEZ  47y, Female  [id number]    HPI:  Ms Martinez is a 47 year old woman with history of Crohn disease (dx 2012 with ileal disease and anal fissure, no surgery, on Remicaid), presents with 1 day fever, abdominal pain, diarrhea.  Stool is liquid and greenish.  Has gone numerous times and is incontinent of stool also.  Patient has chronic mild r sided abdominal discomfort but yesterday noted lower abdominal cramping and today noted worsening moderate abdominal tenderness throughout, more so in the LLQ with abdominal fullness no alleviating or exacerbating factors.     DAy of admission, she also had fever to 101, 6 episodes of watery diarrhea without blood/melena/mucus. She has nausea without vomiting and reduced PO intake. She also complains of diffuse body aches more so in her back, predominantly left upper back, diffuse knee/hip arthralgias without swelling/redness.   She recently completed a course of clindamycin for 17 days for strep throat.   Her Crohn disease was diagnosed in 2012 and she has been on Remicaid since 2014. She was due to remicaid infusion July 14th but this was skipped due to active strep infection.   Denies dysuria, no cough  no recent travel      PMH/PSH--  Thrombus due to any device, implant or graft, sequela s/p thrombectomy  Fibromyalgia  Temporal mandibular joint disorder  Plantar fasciitis  Breast nodule  H/O hypercholesterolemia  Crohn disease  Gastro - Esophageal Reflux Disease  History of pituitary adenoma s/p resection  H/O endoscopy  H/O colonoscopy  History of cholecystectomy      Allergies-- ceftin      Medications--  Antibiotics: ciprofloxacin   IVPB 400 milliGRAM(s) IV Intermittent every 12 hours  metroNIDAZOLE  IVPB 500 milliGRAM(s) IV Intermittent every 8 hours    Immunologic:   Other: sodium chloride 0.9%.  pantoprazole    Tablet  heparin  Injectable  sodium chloride 0.9%.  acetaminophen   Tablet PRN  ondansetron Injectable PRN      Social History--  EtOH: denies ***  Tobacco: former  Drug Use: denies ***  works as     and lives with her     Family/Marital History--  Family history of hypertension (Father, Mother)  Family history of diabetes mellitus (Father)  Family history of coronary artery disease    Remainder not relevant to clincial concern.    Travel/Environmental/[place]upational History:  [place]    Review of Systems:  A >=10-point review of systems was obtained.     Pertinent positives and negatives--  Constitutional: + fevers. No Chills. No Rigors.   Eyes: no blurry vision  ENMT: no sore throat  Cardiovascular: No chest pain. No palpitations.  Respiratory: No shortness of breath. No cough.  Gastrointestinal: + nausea + vomiting. + diarrhea  Genitourinary: no frequency or dysuria  Musculoskeletal: no myalgia  Skin: no rash  Neurologic: no headache  Psychiatric: Pleasant. Appropriate affect.  Review of systems otherwise negative except as previously noted.    Physical Exam--  Vital Signs: T(F): 99 (07-24-17 @ 08:14), Max: 102.4 (07-24-17 @ 00:39)  HR: 83 (07-24-17 @ 08:14)  BP: 95/60 (07-24-17 @ 08:14)  RR: 19 (07-24-17 @ 08:14)  SpO2: 99% (07-24-17 @ 08:14)  Wt(kg): --  General: Nontoxic-appearing Female in no acute distress.  HEENT: AT/NC.. Anicteric. Conjunctiva pink and moist. Oropharynx clear. Dentition fair.  Neck: Not rigid. No sense of mass.  Nodes: None palpable.  Lungs: Clear bilaterally without rales, wheezing or rhonchi  Heart: Regular rate and rhythm. No Murmur.  Abdomen: Bowel sounds present and normoactive. Soft. Nondistended. tender to deep palpation   Extremities: No cyanosis or clubbing. trace edema.   Skin: Warm. Dry. Good turgor. No rash. No vasculitic stigmata.      Laboratory & Imaging Data--  CBC                        12.1   9.72  )-----------( 171      ( 24 Jul 2017 07:46 )             35.8       Chemistries  07-24    143  |  106  |  6<L>  ----------------------------<  106<H>  3.7   |  21<L>  |  0.81    Ca    8.2<L>      24 Jul 2017 07:41  Phos  2.6     07-24  Mg     1.6     07-24    TPro  6.4  /  Alb  3.6  /  TBili  0.4  /  DBili  x   /  AST  17  /  ALT  11  /  AlkPhos  74  07-24      Culture Data  Ova and Parasites (07.24.17 @ 00:46)    Culture Results:   Testing in progress    C. difficile GDH &amp; toxins A/B by EIA (07.23.17 @ 21:51)    Clostridium difficile GDH Toxins A&amp;B, EIA:   Negative    Clostridium difficile GDH Interpretation: Negative for toxigenic C. Difficile.  This specimen is negative for C.  Difficile glutamate dehydrogenase (GDH) antigen and negative for C.  Difficile Toxins A & B, by EIA.  GDH is a highly sensitive screening  marker for C. Difficile that is produced in large amounts by all C.  Difficile strains, both toxigenic and nontoxigenic.  This assay has not  been validated as a test of cure.  Repeat testing during the same episode  of diarrhea is of limited value and is discouraged.  The results of this  assay should always be interpreted in conjunction with pateint's clinical  history.

## 2017-07-25 LAB
ANION GAP SERPL CALC-SCNC: 17 MMOL/L — SIGNIFICANT CHANGE UP (ref 5–17)
BASOPHILS # BLD AUTO: 0.02 K/UL — SIGNIFICANT CHANGE UP (ref 0–0.2)
BASOPHILS NFR BLD AUTO: 0.3 % — SIGNIFICANT CHANGE UP (ref 0–2)
BUN SERPL-MCNC: 5 MG/DL — LOW (ref 7–23)
CALCIUM SERPL-MCNC: 8.2 MG/DL — LOW (ref 8.4–10.5)
CHLORIDE SERPL-SCNC: 104 MMOL/L — SIGNIFICANT CHANGE UP (ref 96–108)
CO2 SERPL-SCNC: 19 MMOL/L — LOW (ref 22–31)
CREAT SERPL-MCNC: 0.8 MG/DL — SIGNIFICANT CHANGE UP (ref 0.5–1.3)
EOSINOPHIL # BLD AUTO: 0.01 K/UL — SIGNIFICANT CHANGE UP (ref 0–0.5)
EOSINOPHIL NFR BLD AUTO: 0.1 % — SIGNIFICANT CHANGE UP (ref 0–6)
GLUCOSE SERPL-MCNC: 91 MG/DL — SIGNIFICANT CHANGE UP (ref 70–99)
HCT VFR BLD CALC: 36.9 % — SIGNIFICANT CHANGE UP (ref 34.5–45)
HGB BLD-MCNC: 12.3 G/DL — SIGNIFICANT CHANGE UP (ref 11.5–15.5)
IMM GRANULOCYTES NFR BLD AUTO: 0.3 % — SIGNIFICANT CHANGE UP (ref 0–1.5)
IRON SATN MFR SERPL: 21 UG/DL — LOW (ref 30–160)
IRON SATN MFR SERPL: 8 % — LOW (ref 14–50)
LYMPHOCYTES # BLD AUTO: 1.52 K/UL — SIGNIFICANT CHANGE UP (ref 1–3.3)
LYMPHOCYTES # BLD AUTO: 20 % — SIGNIFICANT CHANGE UP (ref 13–44)
MCHC RBC-ENTMCNC: 28.5 PG — SIGNIFICANT CHANGE UP (ref 27–34)
MCHC RBC-ENTMCNC: 33.3 GM/DL — SIGNIFICANT CHANGE UP (ref 32–36)
MCV RBC AUTO: 85.4 FL — SIGNIFICANT CHANGE UP (ref 80–100)
MONOCYTES # BLD AUTO: 0.76 K/UL — SIGNIFICANT CHANGE UP (ref 0–0.9)
MONOCYTES NFR BLD AUTO: 10 % — SIGNIFICANT CHANGE UP (ref 2–14)
NEUTROPHILS # BLD AUTO: 5.28 K/UL — SIGNIFICANT CHANGE UP (ref 1.8–7.4)
NEUTROPHILS NFR BLD AUTO: 69.3 % — SIGNIFICANT CHANGE UP (ref 43–77)
PLATELET # BLD AUTO: 166 K/UL — SIGNIFICANT CHANGE UP (ref 150–400)
POTASSIUM SERPL-MCNC: 3.2 MMOL/L — LOW (ref 3.5–5.3)
POTASSIUM SERPL-SCNC: 3.2 MMOL/L — LOW (ref 3.5–5.3)
RBC # BLD: 4.32 M/UL — SIGNIFICANT CHANGE UP (ref 3.8–5.2)
RBC # FLD: 13.2 % — SIGNIFICANT CHANGE UP (ref 10.3–14.5)
S PYO AG SPEC QL IA: NEGATIVE — SIGNIFICANT CHANGE UP
SODIUM SERPL-SCNC: 140 MMOL/L — SIGNIFICANT CHANGE UP (ref 135–145)
TIBC SERPL-MCNC: 257 UG/DL — SIGNIFICANT CHANGE UP (ref 220–430)
UIBC SERPL-MCNC: 236 UG/DL — SIGNIFICANT CHANGE UP (ref 110–370)
WBC # BLD: 7.61 K/UL — SIGNIFICANT CHANGE UP (ref 3.8–10.5)
WBC # FLD AUTO: 7.61 K/UL — SIGNIFICANT CHANGE UP (ref 3.8–10.5)

## 2017-07-25 RX ORDER — ACETAMINOPHEN 500 MG
650 TABLET ORAL ONCE
Qty: 0 | Refills: 0 | Status: COMPLETED | OUTPATIENT
Start: 2017-07-25 | End: 2017-07-25

## 2017-07-25 RX ORDER — POTASSIUM CHLORIDE 20 MEQ
20 PACKET (EA) ORAL
Qty: 0 | Refills: 0 | Status: COMPLETED | OUTPATIENT
Start: 2017-07-25 | End: 2017-07-25

## 2017-07-25 RX ADMIN — Medication 20 MILLIEQUIVALENT(S): at 13:33

## 2017-07-25 RX ADMIN — Medication 100 MILLIGRAM(S): at 00:33

## 2017-07-25 RX ADMIN — PANTOPRAZOLE SODIUM 40 MILLIGRAM(S): 20 TABLET, DELAYED RELEASE ORAL at 06:47

## 2017-07-25 RX ADMIN — Medication 20 MILLIEQUIVALENT(S): at 15:42

## 2017-07-25 RX ADMIN — SODIUM CHLORIDE 125 MILLILITER(S): 9 INJECTION INTRAMUSCULAR; INTRAVENOUS; SUBCUTANEOUS at 22:18

## 2017-07-25 RX ADMIN — SODIUM CHLORIDE 125 MILLILITER(S): 9 INJECTION INTRAMUSCULAR; INTRAVENOUS; SUBCUTANEOUS at 09:07

## 2017-07-25 RX ADMIN — HEPARIN SODIUM 5000 UNIT(S): 5000 INJECTION INTRAVENOUS; SUBCUTANEOUS at 17:15

## 2017-07-25 RX ADMIN — Medication 100 MILLIGRAM(S): at 22:18

## 2017-07-25 RX ADMIN — Medication 650 MILLIGRAM(S): at 06:49

## 2017-07-25 RX ADMIN — Medication 100 MILLIGRAM(S): at 17:11

## 2017-07-25 RX ADMIN — Medication 100 MILLIGRAM(S): at 10:39

## 2017-07-25 RX ADMIN — HEPARIN SODIUM 5000 UNIT(S): 5000 INJECTION INTRAVENOUS; SUBCUTANEOUS at 06:47

## 2017-07-25 RX ADMIN — Medication 20 MILLIEQUIVALENT(S): at 18:11

## 2017-07-25 RX ADMIN — ONDANSETRON 4 MILLIGRAM(S): 8 TABLET, FILM COATED ORAL at 01:00

## 2017-07-25 RX ADMIN — Medication 100 MILLIGRAM(S): at 09:08

## 2017-07-26 LAB
ANION GAP SERPL CALC-SCNC: 13 MMOL/L — SIGNIFICANT CHANGE UP (ref 5–17)
BUN SERPL-MCNC: 8 MG/DL — SIGNIFICANT CHANGE UP (ref 7–23)
CALCIUM SERPL-MCNC: 8.2 MG/DL — LOW (ref 8.4–10.5)
CHLORIDE SERPL-SCNC: 107 MMOL/L — SIGNIFICANT CHANGE UP (ref 96–108)
CO2 SERPL-SCNC: 22 MMOL/L — SIGNIFICANT CHANGE UP (ref 22–31)
CREAT SERPL-MCNC: 0.76 MG/DL — SIGNIFICANT CHANGE UP (ref 0.5–1.3)
GLUCOSE SERPL-MCNC: 105 MG/DL — HIGH (ref 70–99)
HCT VFR BLD CALC: 33.8 % — LOW (ref 34.5–45)
HGB BLD-MCNC: 11.3 G/DL — LOW (ref 11.5–15.5)
IRON SATN MFR SERPL: 26 UG/DL — LOW (ref 30–160)
MCHC RBC-ENTMCNC: 28.5 PG — SIGNIFICANT CHANGE UP (ref 27–34)
MCHC RBC-ENTMCNC: 33.4 GM/DL — SIGNIFICANT CHANGE UP (ref 32–36)
MCV RBC AUTO: 85.1 FL — SIGNIFICANT CHANGE UP (ref 80–100)
PLATELET # BLD AUTO: 180 K/UL — SIGNIFICANT CHANGE UP (ref 150–400)
POTASSIUM SERPL-MCNC: 3.7 MMOL/L — SIGNIFICANT CHANGE UP (ref 3.5–5.3)
POTASSIUM SERPL-SCNC: 3.7 MMOL/L — SIGNIFICANT CHANGE UP (ref 3.5–5.3)
RBC # BLD: 3.97 M/UL — SIGNIFICANT CHANGE UP (ref 3.8–5.2)
RBC # FLD: 13.2 % — SIGNIFICANT CHANGE UP (ref 10.3–14.5)
SODIUM SERPL-SCNC: 142 MMOL/L — SIGNIFICANT CHANGE UP (ref 135–145)
WBC # BLD: 4.92 K/UL — SIGNIFICANT CHANGE UP (ref 3.8–10.5)
WBC # FLD AUTO: 4.92 K/UL — SIGNIFICANT CHANGE UP (ref 3.8–10.5)

## 2017-07-26 RX ORDER — ACETAMINOPHEN 500 MG
650 TABLET ORAL EVERY 6 HOURS
Qty: 0 | Refills: 0 | Status: DISCONTINUED | OUTPATIENT
Start: 2017-07-26 | End: 2017-07-27

## 2017-07-26 RX ORDER — POTASSIUM CHLORIDE 20 MEQ
20 PACKET (EA) ORAL ONCE
Qty: 0 | Refills: 0 | Status: COMPLETED | OUTPATIENT
Start: 2017-07-26 | End: 2017-07-26

## 2017-07-26 RX ADMIN — Medication 100 MILLIGRAM(S): at 08:47

## 2017-07-26 RX ADMIN — ONDANSETRON 4 MILLIGRAM(S): 8 TABLET, FILM COATED ORAL at 00:30

## 2017-07-26 RX ADMIN — Medication 100 MILLIGRAM(S): at 22:14

## 2017-07-26 RX ADMIN — Medication 650 MILLIGRAM(S): at 15:00

## 2017-07-26 RX ADMIN — PANTOPRAZOLE SODIUM 40 MILLIGRAM(S): 20 TABLET, DELAYED RELEASE ORAL at 06:42

## 2017-07-26 RX ADMIN — Medication 20 MILLIEQUIVALENT(S): at 18:34

## 2017-07-26 RX ADMIN — Medication 100 MILLIGRAM(S): at 13:49

## 2017-07-26 RX ADMIN — HEPARIN SODIUM 5000 UNIT(S): 5000 INJECTION INTRAVENOUS; SUBCUTANEOUS at 06:42

## 2017-07-26 RX ADMIN — Medication 650 MILLIGRAM(S): at 14:00

## 2017-07-26 RX ADMIN — Medication 100 MILLIGRAM(S): at 23:18

## 2017-07-26 RX ADMIN — Medication 100 MILLIGRAM(S): at 06:42

## 2017-07-26 RX ADMIN — HEPARIN SODIUM 5000 UNIT(S): 5000 INJECTION INTRAVENOUS; SUBCUTANEOUS at 18:34

## 2017-07-26 RX ADMIN — Medication 100 MILLIGRAM(S): at 00:08

## 2017-07-26 NOTE — PROVIDER CONTACT NOTE (OTHER) - ACTION/TREATMENT ORDERED:
continue to monitor. no orders as of moment.
activity order. ambulate as tolerated.
cbc and cmp am labs ordered
iron lab ordered.

## 2017-07-26 NOTE — PROVIDER CONTACT NOTE (OTHER) - ASSESSMENT
axox4. vss. pt had 2 episodes of diarrhea. 7/25 potassium 3.3. pt received 3 doses 20meq Potassium Chloride PO.  no am labs ordered.

## 2017-07-26 NOTE — PROVIDER CONTACT NOTE (OTHER) - RECOMMENDATIONS
activity order.
notified provider. cbc and cmp am labs
notified provider. iron lab ordered
notified provider.

## 2017-07-27 ENCOUNTER — TRANSCRIPTION ENCOUNTER (OUTPATIENT)
Age: 47
End: 2017-07-27

## 2017-07-27 VITALS
TEMPERATURE: 98 F | OXYGEN SATURATION: 97 % | HEART RATE: 76 BPM | DIASTOLIC BLOOD PRESSURE: 76 MMHG | SYSTOLIC BLOOD PRESSURE: 107 MMHG | RESPIRATION RATE: 18 BRPM

## 2017-07-27 LAB
ANION GAP SERPL CALC-SCNC: 12 MMOL/L — SIGNIFICANT CHANGE UP (ref 5–17)
BUN SERPL-MCNC: 7 MG/DL — SIGNIFICANT CHANGE UP (ref 7–23)
CALCIUM SERPL-MCNC: 8.8 MG/DL — SIGNIFICANT CHANGE UP (ref 8.4–10.5)
CHLORIDE SERPL-SCNC: 106 MMOL/L — SIGNIFICANT CHANGE UP (ref 96–108)
CO2 SERPL-SCNC: 24 MMOL/L — SIGNIFICANT CHANGE UP (ref 22–31)
CREAT SERPL-MCNC: 0.7 MG/DL — SIGNIFICANT CHANGE UP (ref 0.5–1.3)
CULTURE RESULTS: SIGNIFICANT CHANGE UP
GLUCOSE SERPL-MCNC: 143 MG/DL — HIGH (ref 70–99)
HCT VFR BLD CALC: 36.4 % — SIGNIFICANT CHANGE UP (ref 34.5–45)
HGB BLD-MCNC: 12.6 G/DL — SIGNIFICANT CHANGE UP (ref 11.5–15.5)
IRON SATN MFR SERPL: 47 UG/DL — SIGNIFICANT CHANGE UP (ref 30–160)
MCHC RBC-ENTMCNC: 30.4 PG — SIGNIFICANT CHANGE UP (ref 27–34)
MCHC RBC-ENTMCNC: 34.5 GM/DL — SIGNIFICANT CHANGE UP (ref 32–36)
MCV RBC AUTO: 88.1 FL — SIGNIFICANT CHANGE UP (ref 80–100)
PLATELET # BLD AUTO: 194 K/UL — SIGNIFICANT CHANGE UP (ref 150–400)
POTASSIUM SERPL-MCNC: 3.5 MMOL/L — SIGNIFICANT CHANGE UP (ref 3.5–5.3)
POTASSIUM SERPL-SCNC: 3.5 MMOL/L — SIGNIFICANT CHANGE UP (ref 3.5–5.3)
RBC # BLD: 4.13 M/UL — SIGNIFICANT CHANGE UP (ref 3.8–5.2)
RBC # FLD: 11.8 % — SIGNIFICANT CHANGE UP (ref 10.3–14.5)
SODIUM SERPL-SCNC: 142 MMOL/L — SIGNIFICANT CHANGE UP (ref 135–145)
SPECIMEN SOURCE: SIGNIFICANT CHANGE UP
WBC # BLD: 6.2 K/UL — SIGNIFICANT CHANGE UP (ref 3.8–10.5)
WBC # FLD AUTO: 6.2 K/UL — SIGNIFICANT CHANGE UP (ref 3.8–10.5)

## 2017-07-27 PROCEDURE — 87040 BLOOD CULTURE FOR BACTERIA: CPT

## 2017-07-27 PROCEDURE — 84295 ASSAY OF SERUM SODIUM: CPT

## 2017-07-27 PROCEDURE — 82607 VITAMIN B-12: CPT

## 2017-07-27 PROCEDURE — 82330 ASSAY OF CALCIUM: CPT

## 2017-07-27 PROCEDURE — 83540 ASSAY OF IRON: CPT

## 2017-07-27 PROCEDURE — 96374 THER/PROPH/DIAG INJ IV PUSH: CPT

## 2017-07-27 PROCEDURE — 87633 RESP VIRUS 12-25 TARGETS: CPT

## 2017-07-27 PROCEDURE — 87486 CHLMYD PNEUM DNA AMP PROBE: CPT

## 2017-07-27 PROCEDURE — 82947 ASSAY GLUCOSE BLOOD QUANT: CPT

## 2017-07-27 PROCEDURE — 87581 M.PNEUMON DNA AMP PROBE: CPT

## 2017-07-27 PROCEDURE — 85014 HEMATOCRIT: CPT

## 2017-07-27 PROCEDURE — 80053 COMPREHEN METABOLIC PANEL: CPT

## 2017-07-27 PROCEDURE — 87177 OVA AND PARASITES SMEARS: CPT

## 2017-07-27 PROCEDURE — 81003 URINALYSIS AUTO W/O SCOPE: CPT

## 2017-07-27 PROCEDURE — 87081 CULTURE SCREEN ONLY: CPT

## 2017-07-27 PROCEDURE — 87880 STREP A ASSAY W/OPTIC: CPT

## 2017-07-27 PROCEDURE — 99285 EMERGENCY DEPT VISIT HI MDM: CPT | Mod: 25

## 2017-07-27 PROCEDURE — 82435 ASSAY OF BLOOD CHLORIDE: CPT

## 2017-07-27 PROCEDURE — 87798 DETECT AGENT NOS DNA AMP: CPT

## 2017-07-27 PROCEDURE — 85027 COMPLETE CBC AUTOMATED: CPT

## 2017-07-27 PROCEDURE — 84100 ASSAY OF PHOSPHORUS: CPT

## 2017-07-27 PROCEDURE — 82803 BLOOD GASES ANY COMBINATION: CPT

## 2017-07-27 PROCEDURE — 85610 PROTHROMBIN TIME: CPT

## 2017-07-27 PROCEDURE — 87324 CLOSTRIDIUM AG IA: CPT

## 2017-07-27 PROCEDURE — 82746 ASSAY OF FOLIC ACID SERUM: CPT

## 2017-07-27 PROCEDURE — 83690 ASSAY OF LIPASE: CPT

## 2017-07-27 PROCEDURE — 84132 ASSAY OF SERUM POTASSIUM: CPT

## 2017-07-27 PROCEDURE — 87449 NOS EACH ORGANISM AG IA: CPT

## 2017-07-27 PROCEDURE — 83550 IRON BINDING TEST: CPT

## 2017-07-27 PROCEDURE — 80048 BASIC METABOLIC PNL TOTAL CA: CPT

## 2017-07-27 PROCEDURE — 86140 C-REACTIVE PROTEIN: CPT

## 2017-07-27 PROCEDURE — 84145 PROCALCITONIN (PCT): CPT

## 2017-07-27 PROCEDURE — 74176 CT ABD & PELVIS W/O CONTRAST: CPT

## 2017-07-27 PROCEDURE — 83735 ASSAY OF MAGNESIUM: CPT

## 2017-07-27 PROCEDURE — 96375 TX/PRO/DX INJ NEW DRUG ADDON: CPT

## 2017-07-27 PROCEDURE — 85730 THROMBOPLASTIN TIME PARTIAL: CPT

## 2017-07-27 PROCEDURE — 83605 ASSAY OF LACTIC ACID: CPT

## 2017-07-27 RX ORDER — METRONIDAZOLE 500 MG
1 TABLET ORAL
Qty: 6 | Refills: 0 | OUTPATIENT
Start: 2017-07-27 | End: 2017-07-29

## 2017-07-27 RX ORDER — ONDANSETRON 8 MG/1
1 TABLET, FILM COATED ORAL
Qty: 90 | Refills: 0 | OUTPATIENT
Start: 2017-07-27 | End: 2017-08-26

## 2017-07-27 RX ORDER — CIPROFLOXACIN LACTATE 400MG/40ML
1 VIAL (ML) INTRAVENOUS
Qty: 4 | Refills: 0 | OUTPATIENT
Start: 2017-07-27 | End: 2017-07-29

## 2017-07-27 RX ORDER — POTASSIUM CHLORIDE 20 MEQ
1 PACKET (EA) ORAL
Qty: 2 | Refills: 0 | OUTPATIENT
Start: 2017-07-27 | End: 2017-07-29

## 2017-07-27 RX ORDER — CIPROFLOXACIN LACTATE 400MG/40ML
500 VIAL (ML) INTRAVENOUS EVERY 12 HOURS
Qty: 0 | Refills: 0 | Status: DISCONTINUED | OUTPATIENT
Start: 2017-07-27 | End: 2017-07-27

## 2017-07-27 RX ORDER — METRONIDAZOLE 500 MG
500 TABLET ORAL EVERY 8 HOURS
Qty: 0 | Refills: 0 | Status: DISCONTINUED | OUTPATIENT
Start: 2017-07-27 | End: 2017-07-27

## 2017-07-27 RX ADMIN — Medication 500 MILLIGRAM(S): at 10:22

## 2017-07-27 RX ADMIN — PANTOPRAZOLE SODIUM 40 MILLIGRAM(S): 20 TABLET, DELAYED RELEASE ORAL at 06:29

## 2017-07-27 RX ADMIN — Medication 100 MILLIGRAM(S): at 06:29

## 2017-07-27 RX ADMIN — ONDANSETRON 4 MILLIGRAM(S): 8 TABLET, FILM COATED ORAL at 01:52

## 2017-07-27 RX ADMIN — Medication 650 MILLIGRAM(S): at 07:00

## 2017-07-27 RX ADMIN — Medication 500 MILLIGRAM(S): at 14:46

## 2017-07-27 RX ADMIN — HEPARIN SODIUM 5000 UNIT(S): 5000 INJECTION INTRAVENOUS; SUBCUTANEOUS at 06:29

## 2017-07-27 RX ADMIN — Medication 650 MILLIGRAM(S): at 06:29

## 2017-07-27 NOTE — PROGRESS NOTE ADULT - ASSESSMENT
47 year old woman with Crohn disease presents with abdominal pain, diarrhea, fever after finishing antibiotic course.
47F with crohns admitted with colitis likely infectious
47F with crohns ilietis admitted with colitis  cdiff negative  stool cultures still in progress

## 2017-07-27 NOTE — DISCHARGE NOTE ADULT - HOSPITAL COURSE
*** TO BE COMPLETED BY ATTENDING PHYSICIAN *** Colitis Flare- + CT a/p, colitis/ flaygl x 1 given in ED, likey septic , Tmax 101. 6F in ED, IVFs , Cdiff collection pending     7/24 GI; Agree with IV Cipro/Flagyl;  Stool cultures are pending.  Advance diet as tolerated  \* report given to flr ACP     7/25	* GI & ID following; no change in tx plan today; f/u stool cx's    7/26	* IV abx until reba afternoon; then dc home

## 2017-07-27 NOTE — DISCHARGE NOTE ADULT - CARE PLAN
Principal Discharge DX:	Colitis, infectious  Goal:	Resolution of inflammation & diarrhea  Instructions for follow-up, activity and diet:	HOME CARE INSTRUCTIONS  Get plenty of rest.  Drink enough water and fluids to keep your urine clear or pale yellow.  Eat a well-balanced diet.  Call your caregiver for follow-up as recommended.  SEEK IMMEDIATE MEDICAL CARE IF:  You develop chills.  You have an oral temperature above 100.9F, not controlled by medicine.  You have extreme weakness, fainting, or dehydration.  You have repeated vomiting.  You develop severe belly (abdominal) pain or are passing bloody or tarry stools  Secondary Diagnosis:	Sepsis, due to unspecified organism  Instructions for follow-up, activity and diet:	You have completed your antibiotic treatment.  Call your Health care provider upon arrival home to make a one week follow up appointment.  If you develop fever, chills, malaise, or change in mental status call your Health Care Provider or go to the Emergency Department.  Nutrition is important, eat small frequent meals to help ensure you get adequate calories.  Do not stay in bed all day!  Increase your activity daily as tolerated.  Secondary Diagnosis:	Abdominal pain  Instructions for follow-up, activity and diet:	Condition resolving  Secondary Diagnosis:	GERD (gastroesophageal reflux disease)  Instructions for follow-up, activity and diet:	Continue with medications as prescribed.  Secondary Diagnosis:	Crohn's disease of small intestine without complication  Instructions for follow-up, activity and diet:	Follow up with GI on Friday as instructed by Dr Sutherland

## 2017-07-27 NOTE — DISCHARGE NOTE ADULT - CARE PROVIDER_API CALL
Rich Sutherland), Gastroenterology  [address]  [place] [address]  Phone: [phone]  Fax: [phone]

## 2017-07-27 NOTE — PROGRESS NOTE ADULT - PROBLEM SELECTOR PLAN 1
With fever, tachycardia, leukocytosis, most likely 2/2 GI infection in setting of Crohn disease.   -s/p 2L NS, hemodynamically stable  -s/p cipro/flagyl in the ED, continue for now.   -f/u C dif, stool O&P  -f/u blood cultures  -RVP negative  ID consult called  GI consult called
With fever, tachycardia, leukocytosis, most likely 2/2 GI infection in setting of Crohn disease.   -s/p 2L NS, hemodynamically stable  -cipro/flagyl per id 7 days total  -f/u stool cult  -f/u blood cultures  -RVP negative  ID and GI f/u
With fever, tachycardia, leukocytosis, most likely 2/2 GI infection in setting of Crohn disease.   -s/p 2L NS, hemodynamically stable  -cipro/flagyl per id.   -f/u stool cult  -f/u blood cultures  -RVP negative  ID and GI f/u
With fever, tachycardia, leukocytosis, most likely 2/2 GI infection in setting of Crohn disease.   -s/p 2L NS, hemodynamically stable  -cipro/flagyl per id.   -f/u stool cult  -f/u blood cultures  -RVP negative  ID and GI f/u
cont current antibiotics ciprofloxacin and flagyl  follow up pending stool studies  advance diet as tolerated  replete electrolytes
continue cipro and flagyl po for 7 to 10 days total  til aug 2, 2017

## 2017-07-27 NOTE — PROGRESS NOTE ADULT - PROBLEM SELECTOR PLAN 2
-as above, most likely 2/2 infecitous etiology, less likely 2/2 Crohns flair

## 2017-07-27 NOTE — PROGRESS NOTE ADULT - PROBLEM SELECTOR PLAN 3
-obtain CRP, ESR, B12, folate
-obtain CRP, ESR, B12, folate
-obtain CRP, ESR, B12, folate    hypokalemia  -supplement with kcl
-obtain CRP, ESR, B12, folate    hypokalemia  -supplement with kcl

## 2017-07-27 NOTE — DISCHARGE NOTE ADULT - SECONDARY DIAGNOSIS.
Sepsis, due to unspecified organism Abdominal pain GERD (gastroesophageal reflux disease) Crohn's disease of small intestine without complication

## 2017-07-27 NOTE — DISCHARGE NOTE ADULT - MEDICATION SUMMARY - MEDICATIONS TO TAKE
I will START or STAY ON the medications listed below when I get home from the hospital:    metroNIDAZOLE 500 mg oral tablet  -- 1 tab(s) by mouth every 8 hours x2 days  -- Indication: For Colitis, infectious    Zofran ODT 4 mg oral tablet, disintegrating  -- 1 tab(s) by mouth 3 times a day, As Needed for nausea/vomiting  -- Indication: For Nausea/vomit    Remicade  --  intravenous   monthly  -- Indication: For Crohn's disease    potassium chloride 20 mEq oral tablet, extended release  -- 1 dose(s) by mouth once a day x2 days  -- Indication: For Hypokalemia    omeprazole 20 mg oral delayed release capsule  -- 1 cap(s) by mouth once a day  -- Indication: For GERD (gastroesophageal reflux disease)    ciprofloxacin 500 mg oral tablet  -- 1 tab(s) by mouth every 12 hours x2 days  -- Indication: For Colitis, infectious    Vitamin D2  --  by mouth   -- Indication: For Supplement

## 2017-07-27 NOTE — PROGRESS NOTE ADULT - SUBJECTIVE AND OBJECTIVE BOX
[facility], Division of Infectious Diseases  JANELL Cantrell A. 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    Allergies    Apriso (Fever)  Asacol (Fever)  ceftin and medications she does not know what name is (Unknown)  contrast media (iodine-based) (Other)  Medrol Dosepak (Other)    Intolerances    adhesives (Other)      Medications--  Antibiotics:  ciprofloxacin   IVPB 400 milliGRAM(s) IV Intermittent every 12 hours  metroNIDAZOLE  IVPB 500 milliGRAM(s) IV Intermittent every 8 hours    Immunologic:    Other:  sodium chloride 0.9%.  pantoprazole    Tablet  heparin  Injectable  sodium chloride 0.9%.  acetaminophen   Tablet PRN  ondansetron Injectable PRN      Review of Systems--  A 10-point review of systems was obtained.     Pertinent positives and negatives--  Constitutional: No fevers. No Chills. No Rigors.   Cardiovascular: No chest pain. No palpitations.  Respiratory: No shortness of breath. No cough.  Gastrointestinal: No nausea or vomiting. ++diarrhea or constipation.   Psychiatric: Pleasant. Appropriate affect.    Review of systems otherwise negative except as previously noted.    Physical Examination--  Vital Signs: T(F): 98.4 (07-25-17 @ 09:50), Max: 101.5 (07-24-17 @ 19:44)  HR: 72 (07-25-17 @ 09:50)  BP: 104/67 (07-25-17 @ 09:50)  RR: 18 (07-25-17 @ 09:50)  SpO2: 99% (07-25-17 @ 09:50)  Wt(kg): --  General: Nontoxic-appearing Female in no acute distress.  HEENT: AT/NC. Anicteric. Conjunctiva pink and moist. Oropharynx clear. Dentition fair.  Neck: Not rigid. No sense of mass.  Nodes: None palpable.  Lungs: Clear bilaterally without rales, wheezing or rhonchi  Heart: Regular rate and rhythm. No Murmur. No rub  Abdomen: Bowel sounds present and normoactive. Soft. Nondistended. Nontender.   Extremities: No cyanosis or clubbing. trace edema.   Skin: Warm. Dry. Good turgor. No rash. No vasculitic stigmata.  Psychiatric: Appropriate affect and mood for situation.         Laboratory Studies--  CBC                        12.3   7.61  )-----------( 166      ( 25 Jul 2017 07:29 )             36.9       Chemistries  07-25    140  |  104  |  5<L>  ----------------------------<  91  3.2<L>   |  19<L>  |  0.80    Ca    8.2<L>      25 Jul 2017 07:25  Phos  2.6     07-24  Mg     1.6     07-24    TPro  6.4  /  Alb  3.6  /  TBili  0.4  /  DBili  x   /  AST  17  /  ALT  11  /  AlkPhos  74  07-24      Culture Data  Culture - Blood (07.24.17 @ 00:52)    Specimen Source: .Blood Blood-Venous    Culture Results:   No growth to date.      Ova and Parasites (07.24.17 @ 00:46)    Culture Results:   Testing in progress
[facility], Division of Infectious Diseases  JANELL Cantrell A. Lee  [phone]  -  Name: SADIA MARTINEZ  Age: 47y  Gender: Female  MRN: [id number]    Interval History--  Notes reviewed  had some solid stools.  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    Allergies    Apriso (Fever)  Asacol (Fever)  ceftin and medications she does not know what name is (Unknown)  contrast media (iodine-based) (Other)  Medrol Dosepak (Other)    Intolerances    adhesives (Other)      Medications--  Antibiotics:  ciprofloxacin     Tablet 500 milliGRAM(s) Oral every 12 hours  metroNIDAZOLE    Tablet 500 milliGRAM(s) Oral every 8 hours    Immunologic:    Other:  pantoprazole    Tablet  heparin  Injectable  acetaminophen   Tablet PRN  ondansetron Injectable PRN  acetaminophen   Tablet. PRN      Review of Systems--  A 10-point review of systems was obtained.     Pertinent positives and negatives--  diarrhea improving.  Review of systems otherwise negative except as previously noted.    Physical Examination--  Vital Signs: T(F): 98.4 (07-27-17 @ 06:25), Max: 98.4 (07-27-17 @ 06:25)  HR: 72 (07-27-17 @ 06:25)  BP: 114/73 (07-27-17 @ 06:25)  RR: 18 (07-27-17 @ 06:25)  SpO2: 96% (07-27-17 @ 06:25)  Wt(kg): --  General: Nontoxic-appearing Female in no acute distress.  HEENT: AT/NC.Anicteric. Conjunctiva pink and moist. Oropharynx clear. Dentition fair.  Neck: Not rigid. No sense of mass.  Nodes: None palpable.  Lungs: Clear bilaterally without rales, wheezing or ronchi  Heart: Regular rate and rhythm. No Murmur.  Abdomen: Bowel sounds present and normoactive. Soft. Nondistended. Nontender.  Extremities: No cyanosis or clubbing.trace edema   Skin: Warm. Dry. Good turgor. No rash. No vasculitic stigmata.  Psychiatric: Appropriate affect and mood for situation.       Laboratory Studies--  CBC                        12.6   6.2   )-----------( 194      ( 27 Jul 2017 10:41 )             36.4       Chemistries  07-27    142  |  106  |  7   ----------------------------<  143<H>  3.5   |  24  |  0.70    Ca    8.8      27 Jul 2017 10:41        Culture DataOva and Parasites (07.24.17 @ 00:46)    Culture Results:   Testing in progress
SADIA MARTINEZ  47y Female  MRN:[id number]    Patient is a 47y old  Female who presents with a chief complaint of Diarrhea, fever (23 Jul 2017 21:43)    HPI:  Ms Martinez is a 47 year old woman with history of Crohn disease (dx 2012 with ileal disease and anal fissure, no surgery, on Remicaid), pituitary adenoma s/p resection 10/14 c/b RUE arterial thrombus s/p thrombectomy, fibromyalgia presents with 1 day fever, abdominal pain, diarrhea. Patient has chronic mild r sided abdominal discomfort but yesterday noted lower abdominal cramping and today noted worsening moderate abdominal tenderness throughout, more so in the LLQ with abdominal fullness but no pain per se when not touched, no alleviating or exacerbating factors. Today she also had fever to 101, 6 episodes of watery diarrhea without blood/melena/mucus. She has nausea without vomiting and reduced PO intake. She also complains of diffuse body aches more so in her back, predominantly left upper back, diffuse knee/hip arthralgias without swelling/redness.   She recently completed a course of clindamycin for 17 days for strep throat.   Her Crohn disease was diagnosed in 2012 and she has been on Remicaid since 2014. She was due to remicaid infusion July 14th but this was skipped due to active strep infection.   In the ED VS: Tmax 101.6, , /81, 100% on RA (23 Jul 2017 21:43)      Patient seen and evaluated at bedside. No acute events overnight except as noted.    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, implant or graft, sequela: RUE arterial thrombus, complication of pituitary adenoma resection 2014, s/p thrombectomy at the time  Fibromyalgia  Temporal mandibular joint disorder  Plantar fasciitis: bilateral  on therapy  Breast nodule: right breast   been monitored   yearly sonogram  H/O hypercholesterolemia: no medications  controlled with diet and exercise  Crohn disease: dx 2012  Benign neoplasm of pituitary gland  Gastro - Esophageal Reflux Disease  History of pituitary adenoma: s/p resection 2014  H/O endoscopy: upper GI  H/O colonoscopy  History of cholecystectomy: lap 3/2010      REVIEW OF SYSTEMS:  Constitutional: yes fevers  Skin: No rash.  Eyes: No recent vision problems or eye pain.  ENT: No congestion, ear pain, or sore throat.  Endocrine: No thyroid problems.  Cardiovascular: No chest pain or palpation.  Respiratory: No cough, shortness of breath, congestion, or wheezing.  Gastrointestinal: as per hpi  Genitourinary: No dysuria.  Musculoskeletal: No joint swelling.  Neurologic: No headache.    Vital Signs Last 24 Hrs  T(C): 36.9 (25 Jul 2017 09:50), Max: 38.6 (24 Jul 2017 19:44)  T(F): 98.4 (25 Jul 2017 09:50), Max: 101.5 (24 Jul 2017 19:44)  HR: 72 (25 Jul 2017 09:50) (72 - 89)  BP: 104/67 (25 Jul 2017 09:50) (98/61 - 111/75)  BP(mean): --  RR: 18 (25 Jul 2017 09:50) (18 - 18)  SpO2: 99% (25 Jul 2017 09:50) (94% - 99%)      PHYSICAL EXAM:  GENERAL: NAD, well-developed  HEAD:  Atraumatic, Normocephalic  EYES: EOMI, PERRLA, conjunctiva and sclera clear  NECK: Supple, No JVD  CHEST/LUNG: Clear to auscultation bilaterally; No wheeze  HEART: S1, S2; No murmurs, rubs, or gallops  ABDOMEN: Soft, diffuse tenderness. +BS  EXTREMITIES:  2+ Peripheral Pulses, No clubbing, cyanosis, or edema  PSYCH: Normal affect  NEUROLOGY: AAOX3; non-focal  SKIN: No rashes or lesions    Consultant(s) Notes Reviewed:  [x ] YES  [ ] NO  Care Discussed with Consultants/Other Providers [ x] YES  [ ] NO    MEDS:  MEDICATIONS  (STANDING):  sodium chloride 0.9%. 1000 milliLiter(s) (125 mL/Hr) IV Continuous <Continuous>  pantoprazole    Tablet 40 milliGRAM(s) Oral before breakfast  heparin  Injectable 5000 Unit(s) SubCutaneous every 12 hours  sodium chloride 0.9%. 1000 milliLiter(s) (1000 mL/Hr) IV Continuous <Continuous>  ciprofloxacin   IVPB 400 milliGRAM(s) IV Intermittent every 12 hours  metroNIDAZOLE  IVPB 500 milliGRAM(s) IV Intermittent every 8 hours  potassium chloride    Tablet ER 20 milliEquivalent(s) Oral once    MEDICATIONS  (PRN):  acetaminophen   Tablet 650 milliGRAM(s) Oral every 6 hours PRN For Temp greater than 38 C (100.4 F)  ondansetron Injectable 4 milliGRAM(s) IV Push every 6 hours PRN Nausea    ALLERGIES:  adhesives (Other)  Apriso (Fever)  Asacol (Fever)  ceftin and medications she does not know what name is (Unknown)  contrast media (iodine-based) (Other)  Medrol Dosepak (Other)      LABS:                            12.3   7.61  )-----------( 166      ( 25 Jul 2017 07:29 )             36.9   07-25    140  |  104  |  5<L>  ----------------------------<  91  3.2<L>   |  19<L>  |  0.80    Ca    8.2<L>      25 Jul 2017 07:25  Phos  2.6     07-24  Mg     1.6     07-24    TPro  6.4  /  Alb  3.6  /  TBili  0.4  /  DBili  x   /  AST  17  /  ALT  11  /  AlkPhos  74  07-24
SADIA MARTINEZ  47y Female  MRN:[id number]    Patient is a 47y old  Female who presents with a chief complaint of Diarrhea, fever (23 Jul 2017 21:43)    HPI:  Ms Martinez is a 47 year old woman with history of Crohn disease (dx 2012 with ileal disease and anal fissure, no surgery, on Remicaid), pituitary adenoma s/p resection 10/14 c/b RUE arterial thrombus s/p thrombectomy, fibromyalgia presents with 1 day fever, abdominal pain, diarrhea. Patient has chronic mild r sided abdominal discomfort but yesterday noted lower abdominal cramping and today noted worsening moderate abdominal tenderness throughout, more so in the LLQ with abdominal fullness but no pain per se when not touched, no alleviating or exacerbating factors. Today she also had fever to 101, 6 episodes of watery diarrhea without blood/melena/mucus. She has nausea without vomiting and reduced PO intake. She also complains of diffuse body aches more so in her back, predominantly left upper back, diffuse knee/hip arthralgias without swelling/redness.   She recently completed a course of clindamycin for 17 days for strep throat.   Her Crohn disease was diagnosed in 2012 and she has been on Remicaid since 2014. She was due to remicaid infusion July 14th but this was skipped due to active strep infection.   In the ED VS: Tmax 101.6, , /81, 100% on RA (23 Jul 2017 21:43)      Patient seen and evaluated at bedside. No acute events overnight except as noted.    Interval HPI: feels well. 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, implant or graft, sequela: RUE arterial thrombus, complication of pituitary adenoma resection 2014, s/p thrombectomy at the time  Fibromyalgia  Temporal mandibular joint disorder  Plantar fasciitis: bilateral  on therapy  Breast nodule: right breast   been monitored   yearly sonogram  H/O hypercholesterolemia: no medications  controlled with diet and exercise  Crohn disease: dx 2012  Benign neoplasm of pituitary gland  Gastro - Esophageal Reflux Disease  History of pituitary adenoma: s/p resection 2014  H/O endoscopy: upper GI  H/O colonoscopy  History of cholecystectomy: lap 3/2010      REVIEW OF SYSTEMS:  Constitutional: yes fevers  Skin: No rash.  Eyes: No recent vision problems or eye pain.  ENT: No congestion, ear pain, or sore throat.  Endocrine: No thyroid problems.  Cardiovascular: No chest pain or palpation.  Respiratory: No cough, shortness of breath, congestion, or wheezing.  Gastrointestinal: as per hpi  Genitourinary: No dysuria.  Musculoskeletal: No joint swelling.  Neurologic: No headache.    Vital Signs Last 24 Hrs  T(C): 36.7 (27 Jul 2017 12:55), Max: 36.9 (27 Jul 2017 06:25)  T(F): 98.1 (27 Jul 2017 12:55), Max: 98.4 (27 Jul 2017 06:25)  HR: 76 (27 Jul 2017 12:55) (70 - 76)  BP: 107/76 (27 Jul 2017 12:55) (104/70 - 114/73)  BP(mean): --  RR: 18 (27 Jul 2017 12:55) (18 - 18)  SpO2: 97% (27 Jul 2017 12:55) (96% - 98%)    PHYSICAL EXAM:  GENERAL: NAD, well-developed  HEAD:  Atraumatic, Normocephalic  EYES: EOMI, PERRLA, conjunctiva and sclera clear  NECK: Supple, No JVD  CHEST/LUNG: Clear to auscultation bilaterally; No wheeze  HEART: S1, S2; No murmurs, rubs, or gallops  ABDOMEN: Soft, diffuse tenderness. +BS  EXTREMITIES:  2+ Peripheral Pulses, No clubbing, cyanosis, or edema  PSYCH: Normal affect  NEUROLOGY: AAOX3; non-focal  SKIN: No rashes or lesions    Consultant(s) Notes Reviewed:  [x ] YES  [ ] NO  Care Discussed with Consultants/Other Providers [ x] YES  [ ] NO    MEDICATIONS  (STANDING):  pantoprazole    Tablet 40 milliGRAM(s) Oral before breakfast  heparin  Injectable 5000 Unit(s) SubCutaneous every 12 hours  ciprofloxacin     Tablet 500 milliGRAM(s) Oral every 12 hours  metroNIDAZOLE    Tablet 500 milliGRAM(s) Oral every 8 hours    MEDICATIONS  (PRN):  acetaminophen   Tablet 650 milliGRAM(s) Oral every 6 hours PRN For Temp greater than 38 C (100.4 F)  ondansetron Injectable 4 milliGRAM(s) IV Push every 6 hours PRN Nausea  acetaminophen   Tablet. 650 milliGRAM(s) Oral every 6 hours PRN Mild Pain (1 - 3)      ALLERGIES:  adhesives (Other)  Apriso (Fever)  Asacol (Fever)  ceftin and medications she does not know what name is (Unknown)  contrast media (iodine-based) (Other)  Medrol Dosepak (Other)      LABS:                                   12.6   6.2   )-----------( 194      ( 27 Jul 2017 10:41 )             36.4   07-27    142  |  106  |  7   ----------------------------<  143<H>  3.5   |  24  |  0.70    Ca    8.8      27 Jul 2017 10:41
SADIA MARTINEZ  47y Female  MRN:[id number]    Patient is a 47y old  Female who presents with a chief complaint of Diarrhea, fever (23 Jul 2017 21:43)    HPI:  Ms Martinez is a 47 year old woman with history of Crohn disease (dx 2012 with ileal disease and anal fissure, no surgery, on Remicaid), pituitary adenoma s/p resection 10/14 c/b RUE arterial thrombus s/p thrombectomy, fibromyalgia presents with 1 day fever, abdominal pain, diarrhea. Patient has chronic mild r sided abdominal discomfort but yesterday noted lower abdominal cramping and today noted worsening moderate abdominal tenderness throughout, more so in the LLQ with abdominal fullness but no pain per se when not touched, no alleviating or exacerbating factors. Today she also had fever to 101, 6 episodes of watery diarrhea without blood/melena/mucus. She has nausea without vomiting and reduced PO intake. She also complains of diffuse body aches more so in her back, predominantly left upper back, diffuse knee/hip arthralgias without swelling/redness.   She recently completed a course of clindamycin for 17 days for strep throat.   Her Crohn disease was diagnosed in 2012 and she has been on Remicaid since 2014. She was due to remicaid infusion July 14th but this was skipped due to active strep infection.   In the ED VS: Tmax 101.6, , /81, 100% on RA (23 Jul 2017 21:43)      Patient seen and evaluated at bedside. No acute events overnight except as noted.    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, implant or graft, sequela: RUE arterial thrombus, complication of pituitary adenoma resection 2014, s/p thrombectomy at the time  Fibromyalgia  Temporal mandibular joint disorder  Plantar fasciitis: bilateral  on therapy  Breast nodule: right breast   been monitored   yearly sonogram  H/O hypercholesterolemia: no medications  controlled with diet and exercise  Crohn disease: dx 2012  Benign neoplasm of pituitary gland  Gastro - Esophageal Reflux Disease  History of pituitary adenoma: s/p resection 2014  H/O endoscopy: upper GI  H/O colonoscopy  History of cholecystectomy: lap 3/2010      REVIEW OF SYSTEMS:  Constitutional: yes fevers  Skin: No rash.  Eyes: No recent vision problems or eye pain.  ENT: No congestion, ear pain, or sore throat.  Endocrine: No thyroid problems.  Cardiovascular: No chest pain or palpation.  Respiratory: No cough, shortness of breath, congestion, or wheezing.  Gastrointestinal: as per hpi  Genitourinary: No dysuria.  Musculoskeletal: No joint swelling.  Neurologic: No headache.    Vital Signs Last 24 Hrs  T(C): 36.7 (26 Jul 2017 11:42), Max: 37.2 (25 Jul 2017 19:34)  T(F): 98 (26 Jul 2017 11:42), Max: 99 (25 Jul 2017 19:34)  HR: 73 (26 Jul 2017 11:42) (67 - 85)  BP: 119/77 (26 Jul 2017 11:42) (97/66 - 126/78)  BP(mean): --  RR: 18 (26 Jul 2017 11:42) (18 - 18)  SpO2: 99% (26 Jul 2017 11:42) (97% - 99%)    PHYSICAL EXAM:  GENERAL: NAD, well-developed  HEAD:  Atraumatic, Normocephalic  EYES: EOMI, PERRLA, conjunctiva and sclera clear  NECK: Supple, No JVD  CHEST/LUNG: Clear to auscultation bilaterally; No wheeze  HEART: S1, S2; No murmurs, rubs, or gallops  ABDOMEN: Soft, diffuse tenderness. +BS  EXTREMITIES:  2+ Peripheral Pulses, No clubbing, cyanosis, or edema  PSYCH: Normal affect  NEUROLOGY: AAOX3; non-focal  SKIN: No rashes or lesions    Consultant(s) Notes Reviewed:  [x ] YES  [ ] NO  Care Discussed with Consultants/Other Providers [ x] YES  [ ] NO    MEDS:  MEDICATIONS  (STANDING):  sodium chloride 0.9%. 1000 milliLiter(s) (100 mL/Hr) IV Continuous <Continuous>  pantoprazole    Tablet 40 milliGRAM(s) Oral before breakfast  heparin  Injectable 5000 Unit(s) SubCutaneous every 12 hours  ciprofloxacin   IVPB 400 milliGRAM(s) IV Intermittent every 12 hours  metroNIDAZOLE  IVPB 500 milliGRAM(s) IV Intermittent every 8 hours  potassium chloride    Tablet ER 20 milliEquivalent(s) Oral once    MEDICATIONS  (PRN):  acetaminophen   Tablet 650 milliGRAM(s) Oral every 6 hours PRN For Temp greater than 38 C (100.4 F)  ondansetron Injectable 4 milliGRAM(s) IV Push every 6 hours PRN Nausea    ALLERGIES:  adhesives (Other)  Apriso (Fever)  Asacol (Fever)  ceftin and medications she does not know what name is (Unknown)  contrast media (iodine-based) (Other)  Medrol Dosepak (Other)      LABS:                        11.3   4.92  )-----------( 180      ( 26 Jul 2017 08:46 )             33.8   07-26    142  |  107  |  8   ----------------------------<  105<H>  3.7   |  22  |  0.76    Ca    8.2<L>      26 Jul 2017 08:33
SUBJECTIVE:  Feels better - no nausea or fever today.  Still had liquid stools all morning after eating but overall tolerating diet (low fiber) so far.  _____________________________________________________  OBJECTIVE:    T(C): 36.9 (07-25-17 @ 09:50), Max: 38.6 (07-24-17 @ 19:44)  HR: 72 (07-25-17 @ 09:50)  BP: 104/67 (07-25-17 @ 09:50)  RR: 18 (07-25-17 @ 09:50)  SpO2: 99% (07-25-17 @ 09:50)  Wt(kg): --    07-24 @ 07:01  -  07-25 @ 07:00  --------------------------------------------------------  IN:    IV PiggyBack: 300 mL    Oral Fluid: 360 mL    sodium chloride 0.9%.: 250 mL  Total IN: 910 mL    OUT:  Total OUT: 0 mL    Total NET: 910 mL      07-25 @ 07:01  -  07-25 @ 15:27  --------------------------------------------------------  IN:    IV PiggyBack: 300 mL    Oral Fluid: 240 mL  Total IN: 540 mL    OUT:  Total OUT: 0 mL    Total NET: 540 mL        PHYSICAL EXAM:      Constitutional: NAD    Gastrointestinal: mild tenderness right lateral abdomen, normal BS    Psychiatric: A&O x 3, pleasant, cooperative      _____________________________________________________  LABS:                        12.3   7.61  )-----------( 166      ( 25 Jul 2017 07:29 )             36.9     07-25    140  |  104  |  5<L>  ----------------------------<  91  3.2<L>   |  19<L>  |  0.80    Ca    8.2<L>      25 Jul 2017 07:25  Phos  2.6     07-24  Mg     1.6     07-24    TPro  6.4  /  Alb  3.6  /  TBili  0.4  /  DBili  x   /  AST  17  /  ALT  11  /  AlkPhos  74  07-24    LIVER FUNCTIONS - ( 24 Jul 2017 07:41 )  Alb: 3.6 g/dL / Pro: 6.4 g/dL / ALK PHOS: 74 U/L / ALT: 11 U/L / AST: 17 U/L / GGT: x           _____________________________________________________  ACTIVE MEDS:  MEDICATIONS  (STANDING):  sodium chloride 0.9%. 1000 milliLiter(s) (125 mL/Hr) IV Continuous <Continuous>  pantoprazole    Tablet 40 milliGRAM(s) Oral before breakfast  heparin  Injectable 5000 Unit(s) SubCutaneous every 12 hours  sodium chloride 0.9%. 1000 milliLiter(s) (1000 mL/Hr) IV Continuous <Continuous>  ciprofloxacin   IVPB 400 milliGRAM(s) IV Intermittent every 12 hours  metroNIDAZOLE  IVPB 500 milliGRAM(s) IV Intermittent every 8 hours  potassium chloride    Tablet ER 20 milliEquivalent(s) Oral every 2 hours    MEDICATIONS  (PRN):  acetaminophen   Tablet 650 milliGRAM(s) Oral every 6 hours PRN For Temp greater than 38 C (100.4 F)  ondansetron Injectable 4 milliGRAM(s) IV Push every 6 hours PRN Nausea    _____________________________________________________  ASSESSMENT:  47yFemale with known Crohn's ileitis on Remicade now p/w right sided colitis - favor infectious    PLAN:  1.  Continue IV Cipro/Flagyl  2.  Stool cultures pending, C.diff PCR negative  3.  Reassess in the morning for change to oral abx    Rich Sutherland M.D.  [facility]  (O) [phone]
SUBJECTIVE:  Feels better. Tolerating small amounts of food. Had small pellets of stool rather than watery diarrhea this AM. Abdominal pain present but decreased since admission. Having problems with IV site--burning with infusion of antibiotics and does not want to have another IV placed because the plan is for her to go home today. Also, had acute nausea after the IV Cipro (not Flagyl).  _____________________________________________________  OBJECTIVE:    T(C): 36.9 (07-27-17 @ 06:25), Max: 36.9 (07-27-17 @ 06:25)  HR: 72 (07-27-17 @ 06:25)  BP: 114/73 (07-27-17 @ 06:25)  RR: 18 (07-27-17 @ 06:25)  SpO2: 96% (07-27-17 @ 06:25)  Wt(kg): --    General = Comfortable-appearing, no acute distress  Abdomen = Non-distended, normal active bowel sounds, soft, minimal right paraumbilical and LLQ tenderness without rebound or guarding, no palpable mass or organomegaly, no bruit  _____________________________________________________  LABS:                        11.3   4.92  )-----------( 180      ( 26 Jul 2017 08:46 )             33.8     07-26    142  |  107  |  8   ----------------------------<  105<H>  3.7   |  22  |  0.76    Ca    8.2<L>      26 Jul 2017 08:33          _____________________________________________________  ACTIVE MEDS:  MEDICATIONS  (STANDING):  pantoprazole    Tablet 40 milliGRAM(s) Oral before breakfast  heparin  Injectable 5000 Unit(s) SubCutaneous every 12 hours  ciprofloxacin     Tablet 500 milliGRAM(s) Oral every 12 hours  metroNIDAZOLE    Tablet 500 milliGRAM(s) Oral every 8 hours    MEDICATIONS  (PRN):  acetaminophen   Tablet 650 milliGRAM(s) Oral every 6 hours PRN For Temp greater than 38 C (100.4 F)  ondansetron Injectable 4 milliGRAM(s) IV Push every 6 hours PRN Nausea  acetaminophen   Tablet. 650 milliGRAM(s) Oral every 6 hours PRN Mild Pain (1 - 3)    _____________________________________________________  ASSESSMENT:  47yFemale with acute febrile diarrheal syndrome and right-sided colitis (new) on CT in the setting of Crohn's ileitis (not colitis in the past). More likely an infectious diarrhea than a Crohn's flare, but the latter is possible. Should be able to complete the course of abx today orally.    PLAN:  Changed Cipro/Flagyl to oral doses for today  OK from GI standpoint to discharge patient home today after lunch if tolerated  Remicade tomorrow at our office, and can also arrange for IV iron as outpatient (she has received at our office in the past, but recalls that she had fever a day after one of her infusions--patient not sure if the particular iron formulation which was temporally associated with the fever was Venofer or Injectafer)    Rey Perry M.D.  (O) [phone]  (C) [phone]
SUBJECTIVE:  Had a few loose stools overnight.  Tolerated diet.  Feels bloated, had recurrent nausea.  _____________________________________________________  OBJECTIVE:    T(C): 36.7 (07-26-17 @ 11:42), Max: 37.2 (07-25-17 @ 19:34)  HR: 73 (07-26-17 @ 11:42)  BP: 119/77 (07-26-17 @ 11:42)  RR: 18 (07-26-17 @ 11:42)  SpO2: 99% (07-26-17 @ 11:42)  Wt(kg): --    07-25 @ 07:01  -  07-26 @ 07:00  --------------------------------------------------------  IN:    IV PiggyBack: 700 mL    Oral Fluid: 960 mL    sodium chloride 0.9%.: 2500 mL  Total IN: 4160 mL    OUT:  Total OUT: 0 mL    Total NET: 4160 mL      07-26 @ 07:01  -  07-26 @ 13:22  --------------------------------------------------------  IN:    IV PiggyBack: 100 mL  Total IN: 100 mL    OUT:  Total OUT: 0 mL    Total NET: 100 mL        PHYSICAL EXAM:      Constitutional: NAD    Respiratory: CTA    Cardiovascular: RR    Gastrointestinal: soft, ND, mildly tender left lateral abdomen, no r/g/HSM      _____________________________________________________  LABS:                        11.3   4.92  )-----------( 180      ( 26 Jul 2017 08:46 )             33.8     07-26    142  |  107  |  8   ----------------------------<  105<H>  3.7   |  22  |  0.76    Ca    8.2<L>      26 Jul 2017 08:33        _____________________________________________________  ACTIVE MEDS:  MEDICATIONS  (STANDING):  sodium chloride 0.9%. 1000 milliLiter(s) (100 mL/Hr) IV Continuous <Continuous>  pantoprazole    Tablet 40 milliGRAM(s) Oral before breakfast  heparin  Injectable 5000 Unit(s) SubCutaneous every 12 hours  ciprofloxacin   IVPB 400 milliGRAM(s) IV Intermittent every 12 hours  metroNIDAZOLE  IVPB 500 milliGRAM(s) IV Intermittent every 8 hours    MEDICATIONS  (PRN):  acetaminophen   Tablet 650 milliGRAM(s) Oral every 6 hours PRN For Temp greater than 38 C (100.4 F)  ondansetron Injectable 4 milliGRAM(s) IV Push every 6 hours PRN Nausea    _____________________________________________________  ASSESSMENT:  47yFemale with likely infectious colitis    PLAN:  1.  Finish IV Cipro and Flagyl - last dose tomorrow afternoon  2.  OK to d/c home after afternoon antibiotics tomorrow  3.  Proceed with Remicade as scheduled Friday at my office  4.  DVT ppx, JAY Sutherland M.D.  [facility]  (O) [phone]
SADIA MARTINEZ  47y Female  MRN:[id number]    Patient is a 47y old  Female who presents with a chief complaint of Diarrhea, fever (2017 21:43)    HPI:  Ms Martinez is a 47 year old woman with history of Crohn disease (dx  with ileal disease and anal fissure, no surgery, on Remicaid), pituitary adenoma s/p resection 10/14 c/b RUE arterial thrombus s/p thrombectomy, fibromyalgia presents with 1 day fever, abdominal pain, diarrhea. Patient has chronic mild r sided abdominal discomfort but yesterday noted lower abdominal cramping and today noted worsening moderate abdominal tenderness throughout, more so in the LLQ with abdominal fullness but no pain per se when not touched, no alleviating or exacerbating factors. Today she also had fever to 101, 6 episodes of watery diarrhea without blood/melena/mucus. She has nausea without vomiting and reduced PO intake. She also complains of diffuse body aches more so in her back, predominantly left upper back, diffuse knee/hip arthralgias without swelling/redness.   She recently completed a course of clindamycin for 17 days for strep throat.   Her Crohn disease was diagnosed in  and she has been on Remicaid since . She was due to remicaid infusion  but this was skipped due to active strep infection.   In the ED VS: Tmax 101.6, , /81, 100% on RA (2017 21:43)      Patient seen and evaluated at bedside. No acute events overnight except as noted.    Interval HPI: reports watery BM    PAST MEDICAL & SURGICAL HISTORY:  Thrombus due to any device, implant or graft, sequela: RUE arterial thrombus, complication of pituitary adenoma resection , s/p thrombectomy at the time  Fibromyalgia  Temporal mandibular joint disorder  Plantar fasciitis: bilateral  on therapy  Breast nodule: right breast   been monitored   yearly sonogram  H/O hypercholesterolemia: no medications  controlled with diet and exercise  Crohn disease: dx   Benign neoplasm of pituitary gland  Gastro - Esophageal Reflux Disease  History of pituitary adenoma: s/p resection   H/O endoscopy: upper GI  H/O colonoscopy  History of cholecystectomy: lap 3/2010      REVIEW OF SYSTEMS:  Constitutional: yes fevers  Skin: No rash.  Eyes: No recent vision problems or eye pain.  ENT: No congestion, ear pain, or sore throat.  Endocrine: No thyroid problems.  Cardiovascular: No chest pain or palpation.  Respiratory: No cough, shortness of breath, congestion, or wheezing.  Gastrointestinal: as per hpi  Genitourinary: No dysuria.  Musculoskeletal: No joint swelling.  Neurologic: No headache.    VITALS:  Vital Signs Last 24 Hrs  T(C): 37.2 (2017 08:14), Max: 39.1 (2017 00:39)  T(F): 99 (2017 08:14), Max: 102.4 (2017 00:39)  HR: 83 (2017 08:14) (83 - 111)  BP: 95/60 (2017 08:14) (95/60 - 136/84)  BP(mean): --  RR: 19 (2017 08:14) (18 - 20)  SpO2: 99% (2017 08:14) (96% - 100%)      PHYSICAL EXAM:  GENERAL: NAD, well-developed  HEAD:  Atraumatic, Normocephalic  EYES: EOMI, PERRLA, conjunctiva and sclera clear  NECK: Supple, No JVD  CHEST/LUNG: Clear to auscultation bilaterally; No wheeze  HEART: S1, S2; No murmurs, rubs, or gallops  ABDOMEN: Soft, diffuse tenderness. +BS  EXTREMITIES:  2+ Peripheral Pulses, No clubbing, cyanosis, or edema  PSYCH: Normal affect  NEUROLOGY: AAOX3; non-focal  SKIN: No rashes or lesions    Consultant(s) Notes Reviewed:  [x ] YES  [ ] NO  Care Discussed with Consultants/Other Providers [ x] YES  [ ] NO    MEDS:  MEDICATIONS  (STANDING):  sodium chloride 0.9%. 1000 milliLiter(s) (125 mL/Hr) IV Continuous <Continuous>  pantoprazole    Tablet 40 milliGRAM(s) Oral before breakfast  heparin  Injectable 5000 Unit(s) SubCutaneous every 12 hours  sodium chloride 0.9%. 1000 milliLiter(s) (1000 mL/Hr) IV Continuous <Continuous>  ciprofloxacin   IVPB 400 milliGRAM(s) IV Intermittent every 12 hours  metroNIDAZOLE  IVPB 500 milliGRAM(s) IV Intermittent every 8 hours  potassium chloride    Tablet ER 20 milliEquivalent(s) Oral once    MEDICATIONS  (PRN):  acetaminophen   Tablet 650 milliGRAM(s) Oral every 6 hours PRN For Temp greater than 38 C (100.4 F)  ondansetron Injectable 4 milliGRAM(s) IV Push every 6 hours PRN Nausea    ALLERGIES:  adhesives (Other)  Apriso (Fever)  Asacol (Fever)  ceftin and medications she does not know what name is (Unknown)  contrast media (iodine-based) (Other)  Medrol Dosepak (Other)      LABS:                        12.1   9.72  )-----------( 171      ( 2017 07:46 )             35.8         143  |  106  |  6<L>  ----------------------------<  106<H>  3.7   |  21<L>  |  0.81    Ca    8.2<L>      2017 07:41  Phos  2.6       Mg     1.6         TPro  6.4  /  Alb  3.6  /  TBili  0.4  /  DBili  x   /  AST  17  /  ALT  11  /  AlkPhos  74      PT/INR - ( 2017 15:40 )   PT: 11.9 sec;   INR: 1.10 ratio         PTT - ( 2017 15:40 )  PTT:30.5 sec      LIVER FUNCTIONS - ( 2017 07:41 )  Alb: 3.6 g/dL / Pro: 6.4 g/dL / ALK PHOS: 74 U/L / ALT: 11 U/L / AST: 17 U/L / GGT: x           Urinalysis Basic - ( 2017 15:40 )    Color: Yellow / Appearance: Clear / S.019 / pH: x  Gluc: x / Ketone: Negative  / Bili: Negative / Urobili: Negative   Blood: x / Protein: Trace / Nitrite: Negative   Leuk Esterase: Negative / RBC: x / WBC x   Sq Epi: x / Non Sq Epi: x / Bacteria: x

## 2017-07-27 NOTE — PROGRESS NOTE ADULT - PROBLEM SELECTOR PROBLEM 3
Crohn's disease of small intestine without complication

## 2017-07-27 NOTE — DISCHARGE NOTE ADULT - PLAN OF CARE
Resolution of inflammation & diarrhea HOME CARE INSTRUCTIONS  Get plenty of rest.  Drink enough water and fluids to keep your urine clear or pale yellow.  Eat a well-balanced diet.  Call your caregiver for follow-up as recommended.  SEEK IMMEDIATE MEDICAL CARE IF:  You develop chills.  You have an oral temperature above 100.9F, not controlled by medicine.  You have extreme weakness, fainting, or dehydration.  You have repeated vomiting.  You develop severe belly (abdominal) pain or are passing bloody or tarry stools You have completed your antibiotic treatment.  Call your Health care provider upon arrival home to make a one week follow up appointment.  If you develop fever, chills, malaise, or change in mental status call your Health Care Provider or go to the Emergency Department.  Nutrition is important, eat small frequent meals to help ensure you get adequate calories.  Do not stay in bed all day!  Increase your activity daily as tolerated. Condition resolving Continue with medications as prescribed. Follow up with GI on Friday as instructed by Dr Sutherland

## 2017-07-27 NOTE — DISCHARGE NOTE ADULT - PATIENT PORTAL LINK FT
“You can access the Follow[facility] Patient Portal, offered by [facility], by registering with the following website: [email or web address]”

## 2017-07-27 NOTE — PROGRESS NOTE ADULT - PROBLEM SELECTOR PROBLEM 4
GERD (gastroesophageal reflux disease)

## 2017-07-27 NOTE — PROGRESS NOTE ADULT - PROVIDER SPECIALTY LIST ADULT
Gastroenterology
Infectious Disease
Infectious Disease
Internal Medicine

## 2017-07-27 NOTE — PROGRESS NOTE ADULT - PROBLEM SELECTOR PROBLEM 1
Sepsis, due to unspecified organism
Colitis, infectious
Colitis, infectious

## 2017-07-28 LAB
CULTURE RESULTS: SIGNIFICANT CHANGE UP
SPECIMEN SOURCE: SIGNIFICANT CHANGE UP

## 2017-07-29 LAB
CULTURE RESULTS: SIGNIFICANT CHANGE UP
CULTURE RESULTS: SIGNIFICANT CHANGE UP
SPECIMEN SOURCE: SIGNIFICANT CHANGE UP
SPECIMEN SOURCE: SIGNIFICANT CHANGE UP

## 2017-10-27 ENCOUNTER — APPOINTMENT (OUTPATIENT)
Dept: UROGYNECOLOGY | Facility: CLINIC | Age: 47
End: 2017-10-27
Payer: COMMERCIAL

## 2017-10-27 ENCOUNTER — APPOINTMENT (OUTPATIENT)
Dept: ULTRASOUND IMAGING | Facility: IMAGING CENTER | Age: 47
End: 2017-10-27

## 2017-10-27 ENCOUNTER — OUTPATIENT (OUTPATIENT)
Dept: OUTPATIENT SERVICES | Facility: HOSPITAL | Age: 47
LOS: 1 days | End: 2017-10-27
Payer: COMMERCIAL

## 2017-10-27 VITALS
DIASTOLIC BLOOD PRESSURE: 70 MMHG | BODY MASS INDEX: 38.15 KG/M2 | HEIGHT: 65 IN | SYSTOLIC BLOOD PRESSURE: 112 MMHG | WEIGHT: 229 LBS

## 2017-10-27 DIAGNOSIS — B96.89 ACUTE VAGINITIS: ICD-10-CM

## 2017-10-27 DIAGNOSIS — Z98.89 OTHER SPECIFIED POSTPROCEDURAL STATES: Chronic | ICD-10-CM

## 2017-10-27 DIAGNOSIS — Z00.8 ENCOUNTER FOR OTHER GENERAL EXAMINATION: ICD-10-CM

## 2017-10-27 DIAGNOSIS — Z86.39 PERSONAL HISTORY OF OTHER ENDOCRINE, NUTRITIONAL AND METABOLIC DISEASE: Chronic | ICD-10-CM

## 2017-10-27 DIAGNOSIS — N39.0 URINARY TRACT INFECTION, SITE NOT SPECIFIED: ICD-10-CM

## 2017-10-27 DIAGNOSIS — N76.0 ACUTE VAGINITIS: ICD-10-CM

## 2017-10-27 LAB
BILIRUB UR QL STRIP: NORMAL
CLARITY UR: CLEAR
COLLECTION METHOD: NORMAL
GLUCOSE UR-MCNC: NORMAL
HCG UR QL: 0.2 EU/DL
HGB UR QL STRIP.AUTO: NORMAL
KETONES UR-MCNC: NORMAL
LEUKOCYTE ESTERASE UR QL STRIP: NORMAL
NITRITE UR QL STRIP: NORMAL
PH UR STRIP: 5
PROT UR STRIP-MCNC: NORMAL
SP GR UR STRIP: 1.01

## 2017-10-27 PROCEDURE — 51701 INSERT BLADDER CATHETER: CPT

## 2017-10-27 PROCEDURE — 76770 US EXAM ABDO BACK WALL COMP: CPT

## 2017-10-27 PROCEDURE — 76770 US EXAM ABDO BACK WALL COMP: CPT | Mod: 26

## 2017-10-27 PROCEDURE — 99244 OFF/OP CNSLTJ NEW/EST MOD 40: CPT | Mod: 25

## 2017-10-31 ENCOUNTER — MESSAGE (OUTPATIENT)
Age: 47
End: 2017-10-31

## 2017-11-03 ENCOUNTER — APPOINTMENT (OUTPATIENT)
Dept: UROLOGY | Facility: CLINIC | Age: 47
End: 2017-11-03
Payer: COMMERCIAL

## 2017-11-03 ENCOUNTER — OUTPATIENT (OUTPATIENT)
Dept: OUTPATIENT SERVICES | Facility: HOSPITAL | Age: 47
LOS: 1 days | End: 2017-11-03
Payer: COMMERCIAL

## 2017-11-03 ENCOUNTER — APPOINTMENT (OUTPATIENT)
Dept: UROGYNECOLOGY | Facility: CLINIC | Age: 47
End: 2017-11-03
Payer: COMMERCIAL

## 2017-11-03 VITALS
SYSTOLIC BLOOD PRESSURE: 108 MMHG | TEMPERATURE: 98.3 F | OXYGEN SATURATION: 98 % | HEART RATE: 74 BPM | DIASTOLIC BLOOD PRESSURE: 70 MMHG

## 2017-11-03 VITALS
SYSTOLIC BLOOD PRESSURE: 108 MMHG | WEIGHT: 229 LBS | HEIGHT: 65 IN | HEART RATE: 74 BPM | DIASTOLIC BLOOD PRESSURE: 70 MMHG | TEMPERATURE: 98.3 F | BODY MASS INDEX: 38.15 KG/M2

## 2017-11-03 DIAGNOSIS — Z01.818 ENCOUNTER FOR OTHER PREPROCEDURAL EXAMINATION: ICD-10-CM

## 2017-11-03 DIAGNOSIS — Z98.89 OTHER SPECIFIED POSTPROCEDURAL STATES: Chronic | ICD-10-CM

## 2017-11-03 DIAGNOSIS — Z86.39 PERSONAL HISTORY OF OTHER ENDOCRINE, NUTRITIONAL AND METABOLIC DISEASE: Chronic | ICD-10-CM

## 2017-11-03 DIAGNOSIS — Z87.898 PERSONAL HISTORY OF OTHER SPECIFIED CONDITIONS: ICD-10-CM

## 2017-11-03 PROCEDURE — 99205 OFFICE O/P NEW HI 60 MIN: CPT

## 2017-11-03 PROCEDURE — 52000 CYSTOURETHROSCOPY: CPT

## 2017-11-03 PROCEDURE — 87210 SMEAR WET MOUNT SALINE/INK: CPT | Mod: QW

## 2017-11-03 PROCEDURE — 83986 ASSAY PH BODY FLUID NOS: CPT | Mod: QW

## 2017-11-06 DIAGNOSIS — N39.0 URINARY TRACT INFECTION, SITE NOT SPECIFIED: ICD-10-CM

## 2017-11-06 LAB
APPEARANCE: CLEAR
BACTERIA UR CULT: ABNORMAL
BACTERIA: NEGATIVE
BILIRUB UR QL STRIP: NORMAL
BILIRUBIN URINE: NEGATIVE
BLOOD URINE: NEGATIVE
CLARITY UR: CLEAR
COLLECTION METHOD: NORMAL
COLOR: YELLOW
GLUCOSE QUALITATIVE U: NEGATIVE MG/DL
GLUCOSE UR-MCNC: NORMAL
HCG UR QL: 0.2 EU/DL
HGB UR QL STRIP.AUTO: NORMAL
HYALINE CASTS: 7 /LPF
KETONES UR-MCNC: NORMAL
KETONES URINE: NEGATIVE
LEUKOCYTE ESTERASE UR QL STRIP: NORMAL
LEUKOCYTE ESTERASE URINE: ABNORMAL
MICROSCOPIC-UA: NORMAL
NITRITE UR QL STRIP: NORMAL
NITRITE URINE: NEGATIVE
PH UR STRIP: 5
PH URINE: 5
PROT UR STRIP-MCNC: NORMAL
PROTEIN URINE: NEGATIVE MG/DL
RED BLOOD CELLS URINE: 4 /HPF
SP GR UR STRIP: 1.02
SPECIFIC GRAVITY URINE: 1.02
SQUAMOUS EPITHELIAL CELLS: 3 /HPF
UROBILINOGEN URINE: NEGATIVE MG/DL
WHITE BLOOD CELLS URINE: 7 /HPF

## 2017-11-08 ENCOUNTER — APPOINTMENT (OUTPATIENT)
Dept: SPINE | Facility: CLINIC | Age: 47
End: 2017-11-08
Payer: COMMERCIAL

## 2017-11-08 VITALS
HEIGHT: 64.5 IN | WEIGHT: 229 LBS | HEART RATE: 87 BPM | SYSTOLIC BLOOD PRESSURE: 121 MMHG | BODY MASS INDEX: 38.62 KG/M2 | DIASTOLIC BLOOD PRESSURE: 79 MMHG

## 2017-11-08 PROCEDURE — 99214 OFFICE O/P EST MOD 30 MIN: CPT

## 2017-11-09 LAB
A VAGINAE DNA VAG QL NAA+PROBE: NORMAL
BVAB2 DNA VAG QL NAA+PROBE: NORMAL
C KRUSEI DNA VAG QL NAA+PROBE: NEGATIVE
C TRACH RRNA SPEC QL NAA+PROBE: NEGATIVE
MEGA1 DNA VAG QL NAA+PROBE: NORMAL
N GONORRHOEA RRNA SPEC QL NAA+PROBE: NEGATIVE
T VAGINALIS RRNA SPEC QL NAA+PROBE: NEGATIVE

## 2017-11-17 ENCOUNTER — APPOINTMENT (OUTPATIENT)
Dept: UROLOGY | Facility: CLINIC | Age: 47
End: 2017-11-17
Payer: COMMERCIAL

## 2017-11-17 VITALS — HEART RATE: 86 BPM | DIASTOLIC BLOOD PRESSURE: 72 MMHG | OXYGEN SATURATION: 98 % | SYSTOLIC BLOOD PRESSURE: 120 MMHG

## 2017-11-17 DIAGNOSIS — R10.2 PELVIC AND PERINEAL PAIN: ICD-10-CM

## 2017-11-17 DIAGNOSIS — R39.89 OTHER SYMPTOMS AND SIGNS INVOLVING THE GENITOURINARY SYSTEM: ICD-10-CM

## 2017-11-17 DIAGNOSIS — N94.10 UNSPECIFIED DYSPAREUNIA: ICD-10-CM

## 2017-11-17 DIAGNOSIS — M79.1 MYALGIA: ICD-10-CM

## 2017-11-17 DIAGNOSIS — N76.0 ACUTE VAGINITIS: ICD-10-CM

## 2017-11-17 DIAGNOSIS — N94.819 VULVODYNIA, UNSPECIFIED: ICD-10-CM

## 2017-11-17 PROCEDURE — 83986 ASSAY PH BODY FLUID NOS: CPT | Mod: QW

## 2017-11-17 PROCEDURE — 99214 OFFICE O/P EST MOD 30 MIN: CPT

## 2017-11-17 PROCEDURE — 87210 SMEAR WET MOUNT SALINE/INK: CPT | Mod: QW

## 2017-11-20 PROBLEM — N94.819 VULVODYNIA: Status: ACTIVE | Noted: 2017-11-03

## 2017-11-20 PROBLEM — R39.89 BLADDER PAIN: Status: ACTIVE | Noted: 2017-10-27

## 2017-11-20 PROBLEM — R10.2 FEMALE PELVIC PAIN: Status: ACTIVE | Noted: 2017-11-03

## 2017-11-20 PROBLEM — N76.0 RECURRENT VAGINITIS: Status: ACTIVE | Noted: 2017-11-03

## 2017-11-20 PROBLEM — M79.1 MYALGIA OF PELVIC FLOOR: Status: ACTIVE | Noted: 2017-11-03

## 2017-11-20 PROBLEM — N94.10 DYSPAREUNIA, FEMALE: Status: ACTIVE | Noted: 2017-11-03

## 2017-11-20 LAB — BACTERIA UR CULT: NORMAL

## 2017-11-27 ENCOUNTER — RECORD ABSTRACTING (OUTPATIENT)
Age: 47
End: 2017-11-27

## 2017-11-27 DIAGNOSIS — R10.13 EPIGASTRIC PAIN: ICD-10-CM

## 2017-11-27 DIAGNOSIS — R07.89 OTHER CHEST PAIN: ICD-10-CM

## 2017-11-27 DIAGNOSIS — I74.2 EMBOLISM AND THROMBOSIS OF ARTERIES OF THE UPPER EXTREMITIES: ICD-10-CM

## 2017-11-27 DIAGNOSIS — Z87.898 PERSONAL HISTORY OF OTHER SPECIFIED CONDITIONS: ICD-10-CM

## 2017-11-27 DIAGNOSIS — R25.2 CRAMP AND SPASM: ICD-10-CM

## 2017-11-27 DIAGNOSIS — E87.6 HYPOKALEMIA: ICD-10-CM

## 2017-11-27 DIAGNOSIS — Z87.440 PERSONAL HISTORY OF URINARY (TRACT) INFECTIONS: ICD-10-CM

## 2017-11-27 DIAGNOSIS — Z86.39 PERSONAL HISTORY OF OTHER ENDOCRINE, NUTRITIONAL AND METABOLIC DISEASE: ICD-10-CM

## 2017-11-27 RX ORDER — OMEPRAZOLE 20 MG/1
20 TABLET, DELAYED RELEASE ORAL
Refills: 0 | Status: ACTIVE | COMMUNITY

## 2017-12-06 ENCOUNTER — APPOINTMENT (OUTPATIENT)
Dept: CARDIOLOGY | Facility: CLINIC | Age: 47
End: 2017-12-06
Payer: COMMERCIAL

## 2017-12-06 VITALS
WEIGHT: 232 LBS | SYSTOLIC BLOOD PRESSURE: 118 MMHG | RESPIRATION RATE: 15 BRPM | HEART RATE: 76 BPM | BODY MASS INDEX: 39.61 KG/M2 | DIASTOLIC BLOOD PRESSURE: 78 MMHG | HEIGHT: 64 IN

## 2017-12-06 PROCEDURE — 99213 OFFICE O/P EST LOW 20 MIN: CPT

## 2017-12-06 PROCEDURE — 93000 ELECTROCARDIOGRAM COMPLETE: CPT

## 2017-12-08 ENCOUNTER — APPOINTMENT (OUTPATIENT)
Dept: UROLOGY | Facility: CLINIC | Age: 47
End: 2017-12-08

## 2017-12-29 ENCOUNTER — ASOB RESULT (OUTPATIENT)
Age: 47
End: 2017-12-29

## 2017-12-29 ENCOUNTER — APPOINTMENT (OUTPATIENT)
Dept: OBGYN | Facility: CLINIC | Age: 47
End: 2017-12-29
Payer: COMMERCIAL

## 2017-12-29 PROCEDURE — 76830 TRANSVAGINAL US NON-OB: CPT

## 2018-02-12 ENCOUNTER — APPOINTMENT (OUTPATIENT)
Dept: UROGYNECOLOGY | Facility: CLINIC | Age: 48
End: 2018-02-12
Payer: COMMERCIAL

## 2018-02-12 DIAGNOSIS — N39.3 STRESS INCONTINENCE (FEMALE) (MALE): ICD-10-CM

## 2018-02-12 PROCEDURE — 99214 OFFICE O/P EST MOD 30 MIN: CPT

## 2018-02-14 ENCOUNTER — RESULT REVIEW (OUTPATIENT)
Age: 48
End: 2018-02-14

## 2018-02-14 LAB
BACTERIA UR CULT: NORMAL
BILIRUB UR QL STRIP: NORMAL
CLARITY UR: CLEAR
COLLECTION METHOD: NORMAL
GLUCOSE UR-MCNC: NORMAL
HCG UR QL: 0.2 EU/DL
HGB UR QL STRIP.AUTO: NORMAL
KETONES UR-MCNC: NORMAL
LEUKOCYTE ESTERASE UR QL STRIP: NORMAL
NITRITE UR QL STRIP: NORMAL
PH UR STRIP: 5
PROT UR STRIP-MCNC: NORMAL
SP GR UR STRIP: 1.02

## 2018-02-28 ENCOUNTER — MESSAGE (OUTPATIENT)
Age: 48
End: 2018-02-28

## 2018-03-05 ENCOUNTER — OUTPATIENT (OUTPATIENT)
Dept: OUTPATIENT SERVICES | Facility: HOSPITAL | Age: 48
LOS: 1 days | End: 2018-03-05
Payer: COMMERCIAL

## 2018-03-05 VITALS
SYSTOLIC BLOOD PRESSURE: 114 MMHG | HEART RATE: 76 BPM | TEMPERATURE: 99 F | HEIGHT: 65.5 IN | RESPIRATION RATE: 14 BRPM | DIASTOLIC BLOOD PRESSURE: 77 MMHG | WEIGHT: 238.1 LBS | OXYGEN SATURATION: 99 %

## 2018-03-05 DIAGNOSIS — N84.0 POLYP OF CORPUS UTERI: ICD-10-CM

## 2018-03-05 DIAGNOSIS — I82.90 ACUTE EMBOLISM AND THROMBOSIS OF UNSPECIFIED VEIN: Chronic | ICD-10-CM

## 2018-03-05 DIAGNOSIS — Z86.39 PERSONAL HISTORY OF OTHER ENDOCRINE, NUTRITIONAL AND METABOLIC DISEASE: Chronic | ICD-10-CM

## 2018-03-05 DIAGNOSIS — T85.868S: ICD-10-CM

## 2018-03-05 DIAGNOSIS — Z01.818 ENCOUNTER FOR OTHER PREPROCEDURAL EXAMINATION: ICD-10-CM

## 2018-03-05 DIAGNOSIS — Z98.89 OTHER SPECIFIED POSTPROCEDURAL STATES: Chronic | ICD-10-CM

## 2018-03-05 DIAGNOSIS — N92.0 EXCESSIVE AND FREQUENT MENSTRUATION WITH REGULAR CYCLE: ICD-10-CM

## 2018-03-05 PROCEDURE — G0463: CPT

## 2018-03-05 RX ORDER — ERGOCALCIFEROL 1.25 MG/1
0 CAPSULE ORAL
Qty: 0 | Refills: 0 | COMMUNITY

## 2018-03-05 RX ORDER — SODIUM CHLORIDE 9 MG/ML
3 INJECTION INTRAMUSCULAR; INTRAVENOUS; SUBCUTANEOUS EVERY 8 HOURS
Qty: 0 | Refills: 0 | Status: DISCONTINUED | OUTPATIENT
Start: 2018-03-14 | End: 2018-03-29

## 2018-03-05 RX ORDER — LIDOCAINE HCL 20 MG/ML
0.2 VIAL (ML) INJECTION ONCE
Qty: 0 | Refills: 0 | Status: DISCONTINUED | OUTPATIENT
Start: 2018-03-14 | End: 2018-03-29

## 2018-03-05 NOTE — H&P PST ADULT - COMMENTS
h/o fibromyalgia, chronic low back and cervical pain, 4-7/10, takes celebrex prn with some pain relief

## 2018-03-05 NOTE — H&P PST ADULT - HISTORY OF PRESENT ILLNESS
44 year old female presents for endoscopic transphenoidal removal of pituitary tumor for benign neoplasm pituitary l 48 year old female. h/o fibromyalgia, benign pituitary tumro (s/p excision 2014), Crohns disease (on remicade- last dose 1/9/2018). recently diagnosed with endometrial polyp, now scheduled for operative hysteroscopy, resection of endometrial polyp.

## 2018-03-05 NOTE — H&P PST ADULT - HEALTH CARE MAINTENANCE
influenza vaccine 2 weeks ago has not received flu vaccine this season has received flu vaccine this season

## 2018-03-05 NOTE — H&P PST ADULT - OTHER CARE PROVIDERS
Vascular Arthur Grover, MIRANDA Villareal, cardiologist Dr. Jr Sevilla, endocrine [facility]

## 2018-03-05 NOTE — H&P PST ADULT - NSANTHOSAYNRD_GEN_A_CORE
No. MARGO screening performed.  STOP BANG Legend: 0-2 = LOW Risk; 3-4 = INTERMEDIATE Risk; 5-8 = HIGH Risk

## 2018-03-05 NOTE — H&P PST ADULT - ANESTHESIA, PREVIOUS REACTION, PROFILE
none none/due to h/o RUE arterial thrombus during surgery in 2014, pt is requesting not to have the same anesthesiologist, Dr. Bourgeois.   s/w Dr. Negro who stated the anethesiologist does not work here anymore

## 2018-03-05 NOTE — H&P PST ADULT - PSH
H/O colonoscopy    H/O endoscopy  upper GI  History of cholecystectomy  lap 3/2010 H/O colonoscopy    H/O endoscopy  upper GI  History of cholecystectomy  lap 3/2010  History of pituitary adenoma  s/p resection 2014  Thrombus  RUE arterial thrombus secondary to placement of A-line during pituitary surgery in 2014, s/p thrombectomy and vein graft

## 2018-03-05 NOTE — H&P PST ADULT - PMH
Benign neoplasm of pituitary gland    Breast nodule  right breast   been monitored   yearly sonogram  Crohn disease  dx 2012  Gastro - Esophageal Reflux Disease    H/O hypercholesterolemia  no medications  controlled with diet and exercise  Plantar fasciitis  bilateral  on therapy  Temporal mandibular joint disorder Benign neoplasm of pituitary gland    Breast nodule  right breast   been monitored   yearly sonogram  Crohn disease  dx 2012  Fibromyalgia    Gastro - Esophageal Reflux Disease    H/O hypercholesterolemia  no medications  controlled with diet and exercise  Herniated disc, cervical    Plantar fasciitis  bilateral  on therapy  Temporal mandibular joint disorder    Thrombus due to any device, implant or graft, sequela  RUE arterial thrombus, complication of pituitary adenoma resection 2014, s/p thrombectomy at the time

## 2018-03-08 ENCOUNTER — APPOINTMENT (OUTPATIENT)
Dept: VASCULAR SURGERY | Facility: CLINIC | Age: 48
End: 2018-03-08
Payer: COMMERCIAL

## 2018-03-08 VITALS
WEIGHT: 232 LBS | SYSTOLIC BLOOD PRESSURE: 109 MMHG | HEIGHT: 64 IN | DIASTOLIC BLOOD PRESSURE: 71 MMHG | TEMPERATURE: 98.6 F | BODY MASS INDEX: 39.61 KG/M2 | HEART RATE: 83 BPM

## 2018-03-08 DIAGNOSIS — M79.662 PAIN IN LEFT LOWER LEG: ICD-10-CM

## 2018-03-08 PROCEDURE — 93931 UPPER EXTREMITY STUDY: CPT

## 2018-03-08 PROCEDURE — 99212 OFFICE O/P EST SF 10 MIN: CPT

## 2018-03-08 PROCEDURE — 93971 EXTREMITY STUDY: CPT

## 2018-03-13 ENCOUNTER — TRANSCRIPTION ENCOUNTER (OUTPATIENT)
Age: 48
End: 2018-03-13

## 2018-03-14 ENCOUNTER — RESULT REVIEW (OUTPATIENT)
Age: 48
End: 2018-03-14

## 2018-03-14 ENCOUNTER — OUTPATIENT (OUTPATIENT)
Dept: OUTPATIENT SERVICES | Facility: HOSPITAL | Age: 48
LOS: 1 days | End: 2018-03-14
Payer: COMMERCIAL

## 2018-03-14 VITALS
SYSTOLIC BLOOD PRESSURE: 117 MMHG | DIASTOLIC BLOOD PRESSURE: 61 MMHG | HEART RATE: 70 BPM | RESPIRATION RATE: 97 BRPM | OXYGEN SATURATION: 100 %

## 2018-03-14 VITALS
WEIGHT: 238.1 LBS | DIASTOLIC BLOOD PRESSURE: 69 MMHG | RESPIRATION RATE: 15 BRPM | TEMPERATURE: 98 F | SYSTOLIC BLOOD PRESSURE: 101 MMHG | OXYGEN SATURATION: 98 % | HEART RATE: 78 BPM | HEIGHT: 65.5 IN

## 2018-03-14 DIAGNOSIS — Z86.39 PERSONAL HISTORY OF OTHER ENDOCRINE, NUTRITIONAL AND METABOLIC DISEASE: Chronic | ICD-10-CM

## 2018-03-14 DIAGNOSIS — Z98.89 OTHER SPECIFIED POSTPROCEDURAL STATES: Chronic | ICD-10-CM

## 2018-03-14 DIAGNOSIS — N92.0 EXCESSIVE AND FREQUENT MENSTRUATION WITH REGULAR CYCLE: ICD-10-CM

## 2018-03-14 DIAGNOSIS — I82.90 ACUTE EMBOLISM AND THROMBOSIS OF UNSPECIFIED VEIN: Chronic | ICD-10-CM

## 2018-03-14 PROCEDURE — 58558 HYSTEROSCOPY BIOPSY: CPT

## 2018-03-14 PROCEDURE — 88305 TISSUE EXAM BY PATHOLOGIST: CPT

## 2018-03-14 PROCEDURE — 88305 TISSUE EXAM BY PATHOLOGIST: CPT | Mod: 26

## 2018-03-14 RX ORDER — CELECOXIB 200 MG/1
1 CAPSULE ORAL
Qty: 0 | Refills: 0 | COMMUNITY

## 2018-03-14 RX ORDER — ACETAMINOPHEN 500 MG
1000 TABLET ORAL ONCE
Qty: 0 | Refills: 0 | Status: COMPLETED | OUTPATIENT
Start: 2018-03-14 | End: 2018-03-14

## 2018-03-14 RX ORDER — OMEPRAZOLE 10 MG/1
1 CAPSULE, DELAYED RELEASE ORAL
Qty: 0 | Refills: 0 | COMMUNITY

## 2018-03-14 RX ORDER — ONDANSETRON 8 MG/1
4 TABLET, FILM COATED ORAL ONCE
Qty: 0 | Refills: 0 | Status: COMPLETED | OUTPATIENT
Start: 2018-03-14 | End: 2018-03-14

## 2018-03-14 RX ADMIN — Medication 400 MILLIGRAM(S): at 07:49

## 2018-03-14 RX ADMIN — ONDANSETRON 4 MILLIGRAM(S): 8 TABLET, FILM COATED ORAL at 07:50

## 2018-03-14 NOTE — ASU PATIENT PROFILE, ADULT - ANESTHESIA, PREVIOUS REACTION, PROFILE
due to h/o RUE arterial thrombus during surgery in 2014, pt is requesting not to have the same anesthesiologist, Dr. Bourgeois.   s/w Dr. Negro who stated the anethesiologist does not work here anymore/none

## 2018-03-14 NOTE — ASU PATIENT PROFILE, ADULT - PSH
H/O colonoscopy    H/O endoscopy  upper GI  History of cholecystectomy  lap 3/2010  History of pituitary adenoma  s/p resection 2014  Thrombus  RUE arterial thrombus secondary to placement of A-line during pituitary surgery in 2014, s/p thrombectomy and vein graft

## 2018-03-14 NOTE — ASU PREOP CHECKLIST - TO WHOM
BETHANIE Maciel from BETHANIE oMy @ [id number] BETHANIE Maciel from BETHANIE Moy @ 0635 /report received from [place]

## 2018-03-14 NOTE — ASU PATIENT PROFILE, ADULT - PMH
Benign neoplasm of pituitary gland    Breast nodule  right breast   been monitored   yearly sonogram  Crohn disease  dx 2012  Fibromyalgia    Gastro - Esophageal Reflux Disease    H/O hypercholesterolemia  no medications  controlled with diet and exercise  Herniated disc, cervical    Plantar fasciitis  bilateral  on therapy  Temporal mandibular joint disorder    Thrombus due to any device, implant or graft, sequela  RUE arterial thrombus, complication of pituitary adenoma resection 2014, s/p thrombectomy at the time

## 2018-03-14 NOTE — ASU DISCHARGE PLAN (ADULT/PEDIATRIC). - NURSING INSTRUCTIONS
call if not void in 8 hours , for excessive bleeding, pain ,swelling or fever greater than 101. Pelvic rest for 2 weeks, nothing in the vagina . Diet is regular. Showering only no soaking in the tab.

## 2018-03-14 NOTE — BRIEF OPERATIVE NOTE - PROCEDURE
<<-----Click on this checkbox to enter Procedure Hysteroscopic polypectomy of uterus with dilation and curettage  03/14/2018    Active  PRETTY

## 2018-03-14 NOTE — PRE-ANESTHESIA EVALUATION ADULT - ANESTHESIA, PREVIOUS REACTION, PROFILE
none/due to h/o RUE arterial thrombus during surgery in 2014, pt is requesting not to have the same anesthesiologist, Dr. Bourgeois.   s/w Dr. Negro who stated the anethesiologist does not work here anymore

## 2018-03-15 LAB — SURGICAL PATHOLOGY STUDY: SIGNIFICANT CHANGE UP

## 2018-05-14 ENCOUNTER — APPOINTMENT (OUTPATIENT)
Dept: CARDIOLOGY | Facility: CLINIC | Age: 48
End: 2018-05-14
Payer: COMMERCIAL

## 2018-05-14 VITALS
BODY MASS INDEX: 40.97 KG/M2 | DIASTOLIC BLOOD PRESSURE: 84 MMHG | HEART RATE: 91 BPM | HEIGHT: 64 IN | RESPIRATION RATE: 15 BRPM | WEIGHT: 240 LBS | SYSTOLIC BLOOD PRESSURE: 129 MMHG

## 2018-05-14 PROCEDURE — 99213 OFFICE O/P EST LOW 20 MIN: CPT | Mod: 25

## 2018-05-14 PROCEDURE — 93015 CV STRESS TEST SUPVJ I&R: CPT

## 2018-05-15 ENCOUNTER — LABORATORY RESULT (OUTPATIENT)
Age: 48
End: 2018-05-15

## 2018-05-16 ENCOUNTER — APPOINTMENT (OUTPATIENT)
Dept: PULMONOLOGY | Facility: CLINIC | Age: 48
End: 2018-05-16

## 2018-05-16 ENCOUNTER — APPOINTMENT (OUTPATIENT)
Dept: CARDIOLOGY | Facility: CLINIC | Age: 48
End: 2018-05-16
Payer: COMMERCIAL

## 2018-05-16 ENCOUNTER — APPOINTMENT (OUTPATIENT)
Dept: PULMONOLOGY | Facility: CLINIC | Age: 48
End: 2018-05-16
Payer: COMMERCIAL

## 2018-05-16 VITALS
HEART RATE: 88 BPM | OXYGEN SATURATION: 97 % | WEIGHT: 241 LBS | RESPIRATION RATE: 16 BRPM | SYSTOLIC BLOOD PRESSURE: 130 MMHG | DIASTOLIC BLOOD PRESSURE: 70 MMHG | HEIGHT: 65 IN | BODY MASS INDEX: 40.15 KG/M2

## 2018-05-16 DIAGNOSIS — Z87.39 PERSONAL HISTORY OF OTHER DISEASES OF THE MUSCULOSKELETAL SYSTEM AND CONNECTIVE TISSUE: ICD-10-CM

## 2018-05-16 PROCEDURE — 94010 BREATHING CAPACITY TEST: CPT | Mod: 59

## 2018-05-16 PROCEDURE — 94727 GAS DIL/WSHOT DETER LNG VOL: CPT

## 2018-05-16 PROCEDURE — 71046 X-RAY EXAM CHEST 2 VIEWS: CPT

## 2018-05-16 PROCEDURE — 94618 PULMONARY STRESS TESTING: CPT

## 2018-05-16 PROCEDURE — 99204 OFFICE O/P NEW MOD 45 MIN: CPT | Mod: 25

## 2018-05-16 PROCEDURE — 93306 TTE W/DOPPLER COMPLETE: CPT

## 2018-05-16 PROCEDURE — 94729 DIFFUSING CAPACITY: CPT

## 2018-06-18 ENCOUNTER — APPOINTMENT (OUTPATIENT)
Dept: PULMONOLOGY | Facility: CLINIC | Age: 48
End: 2018-06-18

## 2018-06-26 ENCOUNTER — APPOINTMENT (OUTPATIENT)
Dept: PULMONOLOGY | Facility: CLINIC | Age: 48
End: 2018-06-26
Payer: COMMERCIAL

## 2018-06-26 ENCOUNTER — NON-APPOINTMENT (OUTPATIENT)
Age: 48
End: 2018-06-26

## 2018-06-26 VITALS
WEIGHT: 239 LBS | HEART RATE: 87 BPM | DIASTOLIC BLOOD PRESSURE: 80 MMHG | OXYGEN SATURATION: 98 % | SYSTOLIC BLOOD PRESSURE: 122 MMHG | HEIGHT: 65 IN | RESPIRATION RATE: 17 BRPM | BODY MASS INDEX: 39.82 KG/M2

## 2018-06-26 DIAGNOSIS — Z72.820 SLEEP DEPRIVATION: ICD-10-CM

## 2018-06-26 DIAGNOSIS — K21.9 GASTRO-ESOPHAGEAL REFLUX DISEASE W/OUT ESOPHAGITIS: ICD-10-CM

## 2018-06-26 DIAGNOSIS — J30.9 ALLERGIC RHINITIS, UNSPECIFIED: ICD-10-CM

## 2018-06-26 DIAGNOSIS — E55.9 VITAMIN D DEFICIENCY, UNSPECIFIED: ICD-10-CM

## 2018-06-26 PROCEDURE — 99214 OFFICE O/P EST MOD 30 MIN: CPT | Mod: 25

## 2018-06-26 PROCEDURE — 94010 BREATHING CAPACITY TEST: CPT

## 2018-06-27 ENCOUNTER — EMERGENCY (EMERGENCY)
Facility: HOSPITAL | Age: 48
LOS: 1 days | Discharge: HOME | End: 2018-06-27
Attending: EMERGENCY MEDICINE
Payer: COMMERCIAL

## 2018-06-27 VITALS
WEIGHT: 235.01 LBS | HEIGHT: 65 IN | HEART RATE: 74 BPM | RESPIRATION RATE: 18 BRPM | SYSTOLIC BLOOD PRESSURE: 110 MMHG | OXYGEN SATURATION: 100 % | DIASTOLIC BLOOD PRESSURE: 67 MMHG

## 2018-06-27 VITALS
HEART RATE: 73 BPM | OXYGEN SATURATION: 98 % | TEMPERATURE: 99 F | DIASTOLIC BLOOD PRESSURE: 75 MMHG | RESPIRATION RATE: 17 BRPM | SYSTOLIC BLOOD PRESSURE: 111 MMHG

## 2018-06-27 DIAGNOSIS — Z86.39 PERSONAL HISTORY OF OTHER ENDOCRINE, NUTRITIONAL AND METABOLIC DISEASE: Chronic | ICD-10-CM

## 2018-06-27 DIAGNOSIS — Z98.89 OTHER SPECIFIED POSTPROCEDURAL STATES: Chronic | ICD-10-CM

## 2018-06-27 DIAGNOSIS — I82.90 ACUTE EMBOLISM AND THROMBOSIS OF UNSPECIFIED VEIN: Chronic | ICD-10-CM

## 2018-06-27 PROCEDURE — 99283 EMERGENCY DEPT VISIT LOW MDM: CPT

## 2018-06-27 PROCEDURE — 73590 X-RAY EXAM OF LOWER LEG: CPT

## 2018-06-27 PROCEDURE — 73590 X-RAY EXAM OF LOWER LEG: CPT | Mod: 26,RT

## 2018-06-27 RX ORDER — ACETAMINOPHEN 500 MG
650 TABLET ORAL ONCE
Qty: 0 | Refills: 0 | Status: COMPLETED | OUTPATIENT
Start: 2018-06-27 | End: 2018-06-27

## 2018-06-27 RX ADMIN — Medication 650 MILLIGRAM(S): at 12:40

## 2018-06-27 NOTE — ED PROVIDER NOTE - PLAN OF CARE
Stay hydrated. Apply ice 20mins on, 40mins off in cycle for first 24-48 hours. Then apply heat.  Take ibuprofen 600mg every 8hrs for pain as needed. Take with food. May alternate with tylenol 650mg every 6-8 hours as needed for pain.   Follow up with your Primary Care Provider in 1-2 days.  Follow up with orthopedic upon discharge. Information provided.   Call Medical records for your Xray copy at [phone].  Return to ED for worsening pain, fever, weakness, numbness or any other concerning symptoms. rt lower leg pain

## 2018-06-27 NOTE — ED PROVIDER NOTE - PROGRESS NOTE DETAILS
Patient ambulatory in ED without assistance. Declined pain medication. Xray results discussed and recommended PCP and ortho f/u. Will provide number to medical records for Xray copy. - Lizett Mcgregor PA-C
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
## 2018-07-16 PROBLEM — T85.868S: Chronic | Status: ACTIVE | Noted: 2017-07-23

## 2018-08-29 ENCOUNTER — APPOINTMENT (OUTPATIENT)
Dept: PULMONOLOGY | Facility: CLINIC | Age: 48
End: 2018-08-29

## 2018-09-04 PROBLEM — M50.20 OTHER CERVICAL DISC DISPLACEMENT, UNSPECIFIED CERVICAL REGION: Chronic | Status: ACTIVE | Noted: 2018-03-05

## 2018-09-04 PROBLEM — M79.7 FIBROMYALGIA: Chronic | Status: ACTIVE | Noted: 2017-07-23

## 2018-09-05 ENCOUNTER — APPOINTMENT (OUTPATIENT)
Dept: PULMONOLOGY | Facility: CLINIC | Age: 48
End: 2018-09-05

## 2018-10-30 ENCOUNTER — APPOINTMENT (OUTPATIENT)
Dept: PULMONOLOGY | Facility: CLINIC | Age: 48
End: 2018-10-30

## 2018-11-07 ENCOUNTER — APPOINTMENT (OUTPATIENT)
Dept: SPINE | Facility: CLINIC | Age: 48
End: 2018-11-07
Payer: COMMERCIAL

## 2018-11-07 VITALS
SYSTOLIC BLOOD PRESSURE: 112 MMHG | BODY MASS INDEX: 39.82 KG/M2 | HEIGHT: 65 IN | RESPIRATION RATE: 16 BRPM | WEIGHT: 239 LBS | HEART RATE: 79 BPM | DIASTOLIC BLOOD PRESSURE: 78 MMHG

## 2018-11-07 DIAGNOSIS — D35.2 BENIGN NEOPLASM OF PITUITARY GLAND: ICD-10-CM

## 2018-11-07 PROCEDURE — 99214 OFFICE O/P EST MOD 30 MIN: CPT

## 2018-11-07 RX ORDER — NEOMYCIN AND POLYMYXIN B SULFATES AND DEXAMETHASONE 3.5; 10000; 1 MG/G; [IU]/G; MG/G
3.5-10000-0.1 OINTMENT OPHTHALMIC
Refills: 0 | Status: DISCONTINUED | COMMUNITY
End: 2018-11-07

## 2018-12-23 ENCOUNTER — EMERGENCY (EMERGENCY)
Facility: HOSPITAL | Age: 48
LOS: 1 days | Discharge: HOME | End: 2018-12-23
Attending: EMERGENCY MEDICINE
Payer: COMMERCIAL

## 2018-12-23 VITALS
HEART RATE: 87 BPM | RESPIRATION RATE: 17 BRPM | DIASTOLIC BLOOD PRESSURE: 86 MMHG | OXYGEN SATURATION: 98 % | SYSTOLIC BLOOD PRESSURE: 129 MMHG | TEMPERATURE: 99 F

## 2018-12-23 VITALS
WEIGHT: 235.89 LBS | OXYGEN SATURATION: 98 % | RESPIRATION RATE: 16 BRPM | SYSTOLIC BLOOD PRESSURE: 129 MMHG | TEMPERATURE: 98 F | HEART RATE: 79 BPM | DIASTOLIC BLOOD PRESSURE: 76 MMHG

## 2018-12-23 DIAGNOSIS — I82.90 ACUTE EMBOLISM AND THROMBOSIS OF UNSPECIFIED VEIN: Chronic | ICD-10-CM

## 2018-12-23 DIAGNOSIS — Z98.89 OTHER SPECIFIED POSTPROCEDURAL STATES: Chronic | ICD-10-CM

## 2018-12-23 DIAGNOSIS — Z86.39 PERSONAL HISTORY OF OTHER ENDOCRINE, NUTRITIONAL AND METABOLIC DISEASE: Chronic | ICD-10-CM

## 2018-12-23 LAB
ALBUMIN SERPL ELPH-MCNC: 4.2 G/DL — SIGNIFICANT CHANGE UP (ref 3.3–5)
ALP SERPL-CCNC: 89 U/L — SIGNIFICANT CHANGE UP (ref 40–120)
ALT FLD-CCNC: 17 U/L — SIGNIFICANT CHANGE UP (ref 10–45)
ANION GAP SERPL CALC-SCNC: 13 MMOL/L — SIGNIFICANT CHANGE UP (ref 5–17)
APPEARANCE UR: CLEAR — SIGNIFICANT CHANGE UP
AST SERPL-CCNC: 17 U/L — SIGNIFICANT CHANGE UP (ref 10–40)
BILIRUB SERPL-MCNC: 0.4 MG/DL — SIGNIFICANT CHANGE UP (ref 0.2–1.2)
BILIRUB UR-MCNC: NEGATIVE — SIGNIFICANT CHANGE UP
BUN SERPL-MCNC: 11 MG/DL — SIGNIFICANT CHANGE UP (ref 7–23)
CALCIUM SERPL-MCNC: 8.8 MG/DL — SIGNIFICANT CHANGE UP (ref 8.4–10.5)
CHLORIDE SERPL-SCNC: 102 MMOL/L — SIGNIFICANT CHANGE UP (ref 96–108)
CO2 SERPL-SCNC: 23 MMOL/L — SIGNIFICANT CHANGE UP (ref 22–31)
COLOR SPEC: SIGNIFICANT CHANGE UP
CREAT SERPL-MCNC: 0.83 MG/DL — SIGNIFICANT CHANGE UP (ref 0.5–1.3)
DIFF PNL FLD: NEGATIVE — SIGNIFICANT CHANGE UP
GAS PNL BLDV: SIGNIFICANT CHANGE UP
GLUCOSE SERPL-MCNC: 96 MG/DL — SIGNIFICANT CHANGE UP (ref 70–99)
GLUCOSE UR QL: NEGATIVE — SIGNIFICANT CHANGE UP
HCG UR QL: NEGATIVE — SIGNIFICANT CHANGE UP
HCT VFR BLD CALC: 40.3 % — SIGNIFICANT CHANGE UP (ref 34.5–45)
HGB BLD-MCNC: 13.7 G/DL — SIGNIFICANT CHANGE UP (ref 11.5–15.5)
KETONES UR-MCNC: ABNORMAL
LEUKOCYTE ESTERASE UR-ACNC: NEGATIVE — SIGNIFICANT CHANGE UP
LIDOCAIN IGE QN: 32 U/L — SIGNIFICANT CHANGE UP (ref 7–60)
MCHC RBC-ENTMCNC: 28.4 PG — SIGNIFICANT CHANGE UP (ref 27–34)
MCHC RBC-ENTMCNC: 33.9 GM/DL — SIGNIFICANT CHANGE UP (ref 32–36)
MCV RBC AUTO: 83.8 FL — SIGNIFICANT CHANGE UP (ref 80–100)
NITRITE UR-MCNC: NEGATIVE — SIGNIFICANT CHANGE UP
PH UR: 5.5 — SIGNIFICANT CHANGE UP (ref 5–8)
PLATELET # BLD AUTO: 250 K/UL — SIGNIFICANT CHANGE UP (ref 150–400)
POTASSIUM SERPL-MCNC: 3.8 MMOL/L — SIGNIFICANT CHANGE UP (ref 3.5–5.3)
POTASSIUM SERPL-SCNC: 3.8 MMOL/L — SIGNIFICANT CHANGE UP (ref 3.5–5.3)
PROT SERPL-MCNC: 7.1 G/DL — SIGNIFICANT CHANGE UP (ref 6–8.3)
PROT UR-MCNC: NEGATIVE — SIGNIFICANT CHANGE UP
RBC # BLD: 4.81 M/UL — SIGNIFICANT CHANGE UP (ref 3.8–5.2)
RBC # FLD: 12.5 % — SIGNIFICANT CHANGE UP (ref 10.3–14.5)
SODIUM SERPL-SCNC: 138 MMOL/L — SIGNIFICANT CHANGE UP (ref 135–145)
SP GR SPEC: 1.01 — LOW (ref 1.01–1.02)
UROBILINOGEN FLD QL: NEGATIVE — SIGNIFICANT CHANGE UP
WBC # BLD: 10.6 K/UL — HIGH (ref 3.8–10.5)
WBC # FLD AUTO: 10.6 K/UL — HIGH (ref 3.8–10.5)

## 2018-12-23 PROCEDURE — 84295 ASSAY OF SERUM SODIUM: CPT

## 2018-12-23 PROCEDURE — 82947 ASSAY GLUCOSE BLOOD QUANT: CPT

## 2018-12-23 PROCEDURE — 85027 COMPLETE CBC AUTOMATED: CPT

## 2018-12-23 PROCEDURE — 96374 THER/PROPH/DIAG INJ IV PUSH: CPT

## 2018-12-23 PROCEDURE — 82435 ASSAY OF BLOOD CHLORIDE: CPT

## 2018-12-23 PROCEDURE — 85014 HEMATOCRIT: CPT

## 2018-12-23 PROCEDURE — 81025 URINE PREGNANCY TEST: CPT

## 2018-12-23 PROCEDURE — 82330 ASSAY OF CALCIUM: CPT

## 2018-12-23 PROCEDURE — 74176 CT ABD & PELVIS W/O CONTRAST: CPT | Mod: 26

## 2018-12-23 PROCEDURE — 99284 EMERGENCY DEPT VISIT MOD MDM: CPT | Mod: 25

## 2018-12-23 PROCEDURE — 80053 COMPREHEN METABOLIC PANEL: CPT

## 2018-12-23 PROCEDURE — 82803 BLOOD GASES ANY COMBINATION: CPT

## 2018-12-23 PROCEDURE — 83690 ASSAY OF LIPASE: CPT

## 2018-12-23 PROCEDURE — 74176 CT ABD & PELVIS W/O CONTRAST: CPT

## 2018-12-23 PROCEDURE — 83605 ASSAY OF LACTIC ACID: CPT

## 2018-12-23 PROCEDURE — 81003 URINALYSIS AUTO W/O SCOPE: CPT

## 2018-12-23 PROCEDURE — 84132 ASSAY OF SERUM POTASSIUM: CPT

## 2018-12-23 PROCEDURE — 99284 EMERGENCY DEPT VISIT MOD MDM: CPT

## 2018-12-23 RX ORDER — SODIUM CHLORIDE 9 MG/ML
1000 INJECTION INTRAMUSCULAR; INTRAVENOUS; SUBCUTANEOUS ONCE
Qty: 0 | Refills: 0 | Status: COMPLETED | OUTPATIENT
Start: 2018-12-23 | End: 2018-12-23

## 2018-12-23 RX ORDER — ONDANSETRON 8 MG/1
4 TABLET, FILM COATED ORAL ONCE
Qty: 0 | Refills: 0 | Status: COMPLETED | OUTPATIENT
Start: 2018-12-23 | End: 2018-12-23

## 2018-12-23 RX ORDER — ONDANSETRON 8 MG/1
1 TABLET, FILM COATED ORAL
Qty: 12 | Refills: 0
Start: 2018-12-23 | End: 2018-12-26

## 2018-12-23 RX ADMIN — ONDANSETRON 4 MILLIGRAM(S): 8 TABLET, FILM COATED ORAL at 21:53

## 2018-12-23 RX ADMIN — SODIUM CHLORIDE 1000 MILLILITER(S): 9 INJECTION INTRAMUSCULAR; INTRAVENOUS; SUBCUTANEOUS at 19:07

## 2018-12-23 NOTE — ED ADULT TRIAGE NOTE - CHIEF COMPLAINT QUOTE
zurdo montejo pt has not  been hospitalised on over a year finished sulfa  antibiotic has sinus membrane infection

## 2018-12-23 NOTE — ED PROVIDER NOTE - OBJECTIVE STATEMENT
47 yo F PMH Crohns disease, cholecystitis s/p cholecystectomy, pituitary tumor s/p resection (2014), RUE arterial thrombosis s/p thrombectomy and vein graft, cervical herniated discs presenting with mid epigastric pain since yesterday. Pain was described as pressure that worsens with food or water. Pt had nausea but no vomiting. Endorses 3-4 soft stools without blood. No fevers, no chills, no chest pain, no sob, no dysuria. Pt reported using miralax because she was concerned for obstruction. Pt completed course of bactrim for a nasal infection recently. NO sick contacts, no recent travel, no changes in diet. Pt had endoscopy and colonoscopy in June that revealed crohn's disease in the terminal ileum. Pt has been getting remicade q8 weeks.

## 2018-12-23 NOTE — ED PROVIDER NOTE - PHYSICAL EXAMINATION
PHYSICAL EXAM:    GENERAL: Comfortable, no acute distress   HEAD:  Normocephalic, atraumatic  EYES: EOMI, PERRLA  HEENT: Moist mucous membranes  NECK: Supple, No JVD  NERVOUS SYSTEM:  Alert & Oriented X3, Motor Strength 5/5 B/L upper and lower extremities  CHEST/LUNG: Clear to auscultation bilaterally  HEART: Regular rate and rhythm, no murmur   ABDOMEN: Soft, mild epigastric tenderness to palpation. No mcburney's point tenderness, no perea's sign.   EXTREMITIES:   No clubbing, cyanosis, or edema  MUSCULOSKELETAL: No muscle tenderness, no joint tenderness  SKIN:  warm and dry, no rash PHYSICAL EXAM:    GENERAL: Comfortable, no acute distress   HEAD:  Normocephalic, atraumatic  EYES: EOMI, PERRLA  HEENT: Moist mucous membranes  NECK: Supple, No JVD  NERVOUS SYSTEM:  Alert & Oriented X3, Motor Strength 5/5 B/L upper and lower extremities  CHEST/LUNG: Clear to auscultation bilaterally  HEART: Regular rate and rhythm, no murmur   ABDOMEN: Soft, mild epigastric tenderness to palpation. No mcburney's point tenderness, no perea's sign.   EXTREMITIES:   No clubbing, cyanosis, or edema  MUSCULOSKELETAL: No muscle tenderness, no joint tenderness  SKIN:  warm and dry, no rash    Attending note (Christopher): my physical exam findings are documented below:

## 2018-12-23 NOTE — ED PROVIDER NOTE - ATTENDING CONTRIBUTION TO CARE
47y/o F with h/o Crohn's disease, Fibromyalgia, GERD, presenting with upper abdominal pain since yesterday; +nausea, no vomiting, no fever, 3-4 soft stools since yesterday without blood or black stools; states recently used miralax for concerns for obstructing stool / constipation.      On Physical Exam:  General: well appearing, in NAD, speaking clearly in full sentences and without difficulty; cooperative with exam  HEENT: PERRL, MMM  Neck: no neck tenderness, no nuchal rigidity  Cardiac: normal s1, s2; RRR; no MGR  Lungs: CTABL  Abdomen: on my exam patient's abdomen is soft nontender and nondistended  : no bladder tenderness or distension  Skin: intact, no rash  Extremities: no peripheral edema, no gross deformities  Neuro: no gross neurologic deficits    AP: Patient with Crohn's disease, prior abdominal surgeries includign cholecystectomy and h/o GERD, presenting to ED with epigastric pain and nausea; on exam is very well appearing in NAD with a benig abdominal examination on my evaluation.  Given surgical and medical history, will obtain CT of the abdomen/pelvis for further evaluation; check labs including cbc, cmp, lipase, vbg/lactate and UA; suspect symptoms related to gastritis/gerd, however, also consider obstruction or other more serious bowel pathology.    ED Course: patient remained hemodynamically stable and in NAD in ED.  Labs notable for no significant leukocytosis, Lipase <3x upper limit of normal (unlikely pancreatitis) and lactate WNL.  CT shows some nonspecific haziness around mesentery, likely reflective of ongoing inflammation related to patient's Crohn's disease.  As patient shows no signs of acute infectious process, is tolerating po and is well appearing with benign abdominal exam, she can be discharged with strict return precautions and recommendations to f/u with her outpatient providers for further w/u and management of symptoms and Crohn's disease. 49y/o F with h/o Crohn's disease, Fibromyalgia, GERD, presenting with upper abdominal pain since yesterday; +nausea, no vomiting, no fever, 3-4 soft stools since yesterday without blood or black stools; states recently used miralax for concerns for obstructing stool / constipation.      On Physical Exam:  General: well appearing, in NAD, speaking clearly in full sentences and without difficulty; cooperative with exam  HEENT: PERRL, MMM  Neck: no neck tenderness, no nuchal rigidity  Cardiac: normal s1, s2; RRR; no MGR  Lungs: CTABL  Abdomen: on my exam patient's abdomen is soft nontender and nondistended  : no bladder tenderness or distension  Skin: intact, no rash  Extremities: no peripheral edema, no gross deformities  Neuro: no gross neurologic deficits    AP: Patient with Crohn's disease, prior abdominal surgeries including cholecystectomy and h/o GERD, presenting to ED with epigastric pain and nausea; on exam is very well appearing in NAD with a benign abdominal examination on my evaluation.  Given surgical and medical history, will obtain CT of the abdomen/pelvis for further evaluation; check labs including cbc, cmp, lipase, vbg/lactate and UA; suspect symptoms related to gastritis/gerd, however, also consider obstruction or other more serious bowel pathology.    ED Course: patient remained hemodynamically stable and in NAD in ED.  Labs notable for no significant leukocytosis, Lipase <3x upper limit of normal (unlikely pancreatitis) and lactate WNL.  CT shows some nonspecific haziness around mesentery, likely reflective of ongoing inflammation related to patient's Crohn's disease.  As patient shows no signs of acute infectious process, is tolerating po and is well appearing with benign abdominal exam, she can be discharged with strict return precautions and recommendations to f/u with her outpatient providers for further w/u and management of symptoms and Crohn's disease.

## 2018-12-23 NOTE — ED ADULT NURSE NOTE - OBJECTIVE STATEMENT
48 y.o female pmh chrons presenting to ED accompanied by her family member c/o worsening abdominal pain and nausea since completing a sulfa abx that she was prescribed for a "sinus membrane" infection. pt states that since completing the abx she has been experiencing worsening generalized abdominal pain and discomfort worse than her normal chrons flare up and where her abdominal pian is usually located. verbalizes feeling nauseas last night, no vomiting, bloody stools, weakness, dizziness, sob, sick contacts, or burning upon urination. pt denies taking anything for the pain or discomfort, labs and line obtained. VS stable. afebrile. safety maintained. pending ct scan of abdomen.

## 2018-12-23 NOTE — ED ADULT NURSE NOTE - NSIMPLEMENTINTERV_GEN_ALL_ED
Implemented All Universal Safety Interventions:  [place]cker to call system. Call bell, personal items and telephone within reach. Instruct patient to call for assistance. Room bathroom lighting operational. Non-slip footwear when patient is off stretcher. Physically safe environment: no spills, clutter or unnecessary equipment. Stretcher in lowest position, wheels locked, appropriate side rails in place.

## 2018-12-23 NOTE — ED PROVIDER NOTE - PLAN OF CARE
Follow up with gastroenterology for repeat evaluation, call your gastro doctor to schedule an appointment. If you cannot see your doctor please call to schedule an appointment to be seen in 4-6 days with our clinic. Continue your regular home medications. Return here to the emergency department for any new symptoms of acute concern such as intractable vomiting, severe abdominal pain, or bloody bowel movements.

## 2018-12-23 NOTE — ED PROVIDER NOTE - CARE PLAN
Assessment and plan of treatment:	Follow up with gastroenterology for repeat evaluation, call your gastro doctor to schedule an appointment. If you cannot see your doctor please call to schedule an appointment to be seen in 4-6 days with our clinic. Continue your regular home medications. Return here to the emergency department for any new symptoms of acute concern such as intractable vomiting, severe abdominal pain, or bloody bowel movements. Principal Discharge DX:	Abdominal pain  Assessment and plan of treatment:	Follow up with gastroenterology for repeat evaluation, call your gastro doctor to schedule an appointment. If you cannot see your doctor please call to schedule an appointment to be seen in 4-6 days with our clinic. Continue your regular home medications. Return here to the emergency department for any new symptoms of acute concern such as intractable vomiting, severe abdominal pain, or bloody bowel movements.

## 2018-12-23 NOTE — ED PROVIDER NOTE - NSFOLLOWUPCLINICS_GEN_ALL_ED_FT
[facility]  Gastroenterology  [address] [place] [id number]  Phone: [phone]  Fax:   Follow Up Time: 4-6 Days

## 2018-12-23 NOTE — ED PROVIDER NOTE - MEDICAL DECISION MAKING DETAILS
47 yo f pmh crohn's, cholecystitis s/p cholecystectomy, pw mid epigastric abd pain x2 days. ddx includes crohn's flare, gastroenteritis, pancreatitis (although atypical presentation). cbc, cmp, lipase, zofran, 1L NS, urine hcg, ua. Will reassess after fluids and antiemetic

## 2019-01-16 ENCOUNTER — NON-APPOINTMENT (OUTPATIENT)
Age: 49
End: 2019-01-16

## 2019-01-16 ENCOUNTER — APPOINTMENT (OUTPATIENT)
Dept: CARDIOLOGY | Facility: CLINIC | Age: 49
End: 2019-01-16
Payer: COMMERCIAL

## 2019-01-16 VITALS
WEIGHT: 234 LBS | BODY MASS INDEX: 38.99 KG/M2 | SYSTOLIC BLOOD PRESSURE: 127 MMHG | HEART RATE: 72 BPM | DIASTOLIC BLOOD PRESSURE: 82 MMHG | HEIGHT: 65 IN | RESPIRATION RATE: 16 BRPM

## 2019-01-16 PROCEDURE — 99213 OFFICE O/P EST LOW 20 MIN: CPT

## 2019-01-16 PROCEDURE — 93000 ELECTROCARDIOGRAM COMPLETE: CPT

## 2019-01-16 RX ORDER — CELECOXIB 200 MG/1
200 CAPSULE ORAL
Refills: 0 | Status: COMPLETED | COMMUNITY
End: 2019-01-16

## 2019-01-16 RX ORDER — CHOLECALCIFEROL (VITAMIN D3) 1250 MCG
1.25 MG CAPSULE ORAL
Qty: 12 | Refills: 0 | Status: DISCONTINUED | COMMUNITY
Start: 2017-05-22 | End: 2019-01-16

## 2019-01-16 RX ORDER — CYCLOBENZAPRINE HYDROCHLORIDE 10 MG/1
10 TABLET, FILM COATED ORAL
Refills: 0 | Status: COMPLETED | COMMUNITY
End: 2019-01-16

## 2019-01-16 RX ORDER — METRONIDAZOLE 7.5 MG/G
0.75 GEL VAGINAL
Qty: 140 | Refills: 1 | Status: COMPLETED | COMMUNITY
Start: 2017-11-03 | End: 2019-01-16

## 2019-01-16 RX ORDER — DEXAMETHASONE 1 MG/1
1 TABLET ORAL
Refills: 0 | Status: COMPLETED | COMMUNITY
End: 2019-01-16

## 2019-01-16 RX ORDER — METHENAMINE HIPPURATE 1 G/1
1 TABLET ORAL
Qty: 60 | Refills: 5 | Status: COMPLETED | COMMUNITY
Start: 2017-10-27 | End: 2019-01-16

## 2019-01-16 RX ORDER — DICLOFENAC SODIUM 10 MG/G
1 GEL TOPICAL
Refills: 0 | Status: COMPLETED | COMMUNITY
End: 2019-01-16

## 2019-01-16 RX ORDER — RIFAXIMIN 550 MG/1
550 TABLET ORAL
Refills: 0 | Status: COMPLETED | COMMUNITY
End: 2019-01-16

## 2019-01-16 RX ORDER — HYDROCORTISONE ACETATE, PRAMOXINE HCL 2.5; 1 G/100G; G/100G
2.5-1 CREAM TOPICAL
Qty: 28 | Refills: 0 | Status: COMPLETED | COMMUNITY
Start: 2017-05-19 | End: 2019-01-16

## 2019-01-16 RX ORDER — MUPIROCIN 20 MG/G
2 OINTMENT TOPICAL
Refills: 0 | Status: COMPLETED | COMMUNITY
End: 2019-01-16

## 2019-01-16 RX ORDER — ONDANSETRON 4 MG/1
4 TABLET, ORALLY DISINTEGRATING ORAL
Refills: 0 | Status: COMPLETED | COMMUNITY
End: 2019-01-16

## 2019-01-16 RX ORDER — NITROFURANTOIN (MONOHYDRATE/MACROCRYSTALS) 25; 75 MG/1; MG/1
100 CAPSULE ORAL TWICE DAILY
Qty: 10 | Refills: 0 | Status: COMPLETED | COMMUNITY
Start: 2017-11-06 | End: 2019-01-16

## 2019-01-16 RX ORDER — METRONIDAZOLE 7.5 MG/G
0.75 GEL VAGINAL
Qty: 70 | Refills: 0 | Status: COMPLETED | COMMUNITY
Start: 2017-08-07 | End: 2019-01-16

## 2019-01-16 RX ORDER — POTASSIUM CHLORIDE 1.5 G/1
20 POWDER, FOR SOLUTION ORAL
Refills: 0 | Status: COMPLETED | COMMUNITY
End: 2019-01-16

## 2019-01-16 RX ORDER — DOCOSANOL 100 MG/G
10 CREAM TOPICAL
Refills: 0 | Status: COMPLETED | COMMUNITY
End: 2019-01-16

## 2019-01-16 RX ORDER — OLOPATADINE HYDROCHLORIDE 2 MG/ML
0.2 SOLUTION OPHTHALMIC
Refills: 0 | Status: COMPLETED | COMMUNITY
End: 2019-01-16

## 2019-03-14 ENCOUNTER — APPOINTMENT (OUTPATIENT)
Dept: VASCULAR SURGERY | Facility: CLINIC | Age: 49
End: 2019-03-14

## 2019-06-27 ENCOUNTER — APPOINTMENT (OUTPATIENT)
Dept: VASCULAR SURGERY | Facility: CLINIC | Age: 49
End: 2019-06-27
Payer: COMMERCIAL

## 2019-06-27 VITALS
HEART RATE: 69 BPM | TEMPERATURE: 99 F | DIASTOLIC BLOOD PRESSURE: 77 MMHG | HEIGHT: 65 IN | SYSTOLIC BLOOD PRESSURE: 115 MMHG | WEIGHT: 237 LBS | BODY MASS INDEX: 39.49 KG/M2

## 2019-06-27 PROCEDURE — 99212 OFFICE O/P EST SF 10 MIN: CPT

## 2019-06-27 PROCEDURE — 93931 UPPER EXTREMITY STUDY: CPT

## 2019-06-28 NOTE — ASSESSMENT
[FreeTextEntry1] : 49 year old female with PMHx of Crohn's disease, pituitary tumor s/p resection, and GERD presents today for follow up of R brachial artery thrombectomy and patch repair in 2014. She has no new symptoms. Denies pain, swelling, coolness/pallor of the arm. Has some residual numbness/tingling in the forearm that is unchanged. \par \par UE arterial duplex today shows a patent RUE arterial system.\par \par On exam, both arms are warm, well perfused, with palpable pulses.\par \par No further vascular interventions at this time. Patient may follow up as needed.

## 2019-06-28 NOTE — HISTORY OF PRESENT ILLNESS
[FreeTextEntry1] : 49 year old female with PMHx of Crohn's disease, pituitary tumor s/p resection, and GERD presents today for follow up of R brachial artery thrombectomy and patch repair in 2014. She has no new symptoms. Denies pain, swelling, coolness/pallor of the arm. Has some residual numbness/tingling in the forearm that is unchanged. \par \par UE arterial duplex today shows a patent RUE arterial system.

## 2019-06-28 NOTE — REVIEW OF SYSTEMS
[Limb Pain] : no limb pain [Limb Swelling] : no limb swelling [Skin Wound] : no skin wound [Limb Weakness] : no limb weakness [Negative] : Constitutional [de-identified] : right forearm paraesthesias (unchanged)

## 2019-06-28 NOTE — PHYSICAL EXAM
[2+] : left 2+ [Alert] : alert [Oriented to Person] : oriented to person [Oriented to Place] : oriented to place [Oriented to Time] : oriented to time [Calm] : calm [de-identified] : NAD [de-identified] : unlabored [FreeTextEntry1] : Well healed RUE scar. Palpable pulses in both upper extremities. Warm and well perfused.

## 2020-03-03 ENCOUNTER — NON-APPOINTMENT (OUTPATIENT)
Age: 50
End: 2020-03-03

## 2020-03-03 ENCOUNTER — APPOINTMENT (OUTPATIENT)
Dept: CARDIOLOGY | Facility: CLINIC | Age: 50
End: 2020-03-03
Payer: COMMERCIAL

## 2020-03-03 VITALS
HEIGHT: 65 IN | WEIGHT: 238 LBS | BODY MASS INDEX: 39.65 KG/M2 | RESPIRATION RATE: 15 BRPM | HEART RATE: 73 BPM | DIASTOLIC BLOOD PRESSURE: 80 MMHG | SYSTOLIC BLOOD PRESSURE: 126 MMHG

## 2020-03-03 DIAGNOSIS — R06.02 SHORTNESS OF BREATH: ICD-10-CM

## 2020-03-03 PROCEDURE — 93000 ELECTROCARDIOGRAM COMPLETE: CPT

## 2020-03-03 PROCEDURE — 99214 OFFICE O/P EST MOD 30 MIN: CPT

## 2020-07-07 ENCOUNTER — APPOINTMENT (OUTPATIENT)
Dept: CARDIOLOGY | Facility: CLINIC | Age: 50
End: 2020-07-07

## 2020-08-26 ENCOUNTER — NON-APPOINTMENT (OUTPATIENT)
Age: 50
End: 2020-08-26

## 2020-08-26 ENCOUNTER — APPOINTMENT (OUTPATIENT)
Dept: CARDIOLOGY | Facility: CLINIC | Age: 50
End: 2020-08-26
Payer: COMMERCIAL

## 2020-08-26 VITALS
HEART RATE: 68 BPM | RESPIRATION RATE: 15 BRPM | HEIGHT: 65 IN | BODY MASS INDEX: 39.49 KG/M2 | SYSTOLIC BLOOD PRESSURE: 125 MMHG | DIASTOLIC BLOOD PRESSURE: 78 MMHG | WEIGHT: 237 LBS

## 2020-08-26 PROCEDURE — 99214 OFFICE O/P EST MOD 30 MIN: CPT

## 2020-08-26 PROCEDURE — 93306 TTE W/DOPPLER COMPLETE: CPT

## 2020-08-26 PROCEDURE — 93000 ELECTROCARDIOGRAM COMPLETE: CPT

## 2020-08-26 PROCEDURE — ZZZZZ: CPT

## 2020-08-29 ENCOUNTER — LABORATORY RESULT (OUTPATIENT)
Age: 50
End: 2020-08-29

## 2020-09-14 ENCOUNTER — EMERGENCY (EMERGENCY)
Age: 50
LOS: 1 days | Discharge: HOME | End: 2020-09-14
Attending: STUDENT IN AN ORGANIZED HEALTH CARE EDUCATION/TRAINING PROGRAM
Payer: COMMERCIAL

## 2020-09-14 VITALS
HEIGHT: 65 IN | RESPIRATION RATE: 16 BRPM | WEIGHT: 237 LBS | SYSTOLIC BLOOD PRESSURE: 137 MMHG | TEMPERATURE: 98 F | DIASTOLIC BLOOD PRESSURE: 88 MMHG | OXYGEN SATURATION: 98 % | HEART RATE: 78 BPM

## 2020-09-14 VITALS
HEART RATE: 76 BPM | OXYGEN SATURATION: 99 % | SYSTOLIC BLOOD PRESSURE: 136 MMHG | DIASTOLIC BLOOD PRESSURE: 81 MMHG | TEMPERATURE: 98 F | RESPIRATION RATE: 16 BRPM

## 2020-09-14 DIAGNOSIS — Z98.89 OTHER SPECIFIED POSTPROCEDURAL STATES: Chronic | ICD-10-CM

## 2020-09-14 DIAGNOSIS — Z86.39 PERSONAL HISTORY OF OTHER ENDOCRINE, NUTRITIONAL AND METABOLIC DISEASE: Chronic | ICD-10-CM

## 2020-09-14 DIAGNOSIS — I82.90 ACUTE EMBOLISM AND THROMBOSIS OF UNSPECIFIED VEIN: Chronic | ICD-10-CM

## 2020-09-14 LAB
ALBUMIN SERPL ELPH-MCNC: 4.3 G/DL — SIGNIFICANT CHANGE UP (ref 3.3–5)
ALP SERPL-CCNC: 88 U/L — SIGNIFICANT CHANGE UP (ref 40–120)
ALT FLD-CCNC: 17 U/L — SIGNIFICANT CHANGE UP (ref 10–45)
ANION GAP SERPL CALC-SCNC: 11 MMOL/L — SIGNIFICANT CHANGE UP (ref 5–17)
APPEARANCE UR: CLEAR — SIGNIFICANT CHANGE UP
AST SERPL-CCNC: 22 U/L — SIGNIFICANT CHANGE UP (ref 10–40)
BACTERIA # UR AUTO: NEGATIVE — SIGNIFICANT CHANGE UP
BASE EXCESS BLDV CALC-SCNC: 2.7 MMOL/L — HIGH (ref -2–2)
BASOPHILS # BLD AUTO: 0.04 K/UL — SIGNIFICANT CHANGE UP (ref 0–0.2)
BASOPHILS NFR BLD AUTO: 0.5 % — SIGNIFICANT CHANGE UP (ref 0–2)
BILIRUB SERPL-MCNC: 0.4 MG/DL — SIGNIFICANT CHANGE UP (ref 0.2–1.2)
BILIRUB UR-MCNC: NEGATIVE — SIGNIFICANT CHANGE UP
BUN SERPL-MCNC: 11 MG/DL — SIGNIFICANT CHANGE UP (ref 7–23)
CA-I SERPL-SCNC: 1.19 MMOL/L — SIGNIFICANT CHANGE UP (ref 1.12–1.3)
CALCIUM SERPL-MCNC: 9.3 MG/DL — SIGNIFICANT CHANGE UP (ref 8.4–10.5)
CHLORIDE BLDV-SCNC: 105 MMOL/L — SIGNIFICANT CHANGE UP (ref 96–108)
CHLORIDE SERPL-SCNC: 103 MMOL/L — SIGNIFICANT CHANGE UP (ref 96–108)
CO2 BLDV-SCNC: 31 MMOL/L — HIGH (ref 22–30)
CO2 SERPL-SCNC: 26 MMOL/L — SIGNIFICANT CHANGE UP (ref 22–31)
COLOR SPEC: COLORLESS — SIGNIFICANT CHANGE UP
CREAT SERPL-MCNC: 0.83 MG/DL — SIGNIFICANT CHANGE UP (ref 0.5–1.3)
DIFF PNL FLD: NEGATIVE — SIGNIFICANT CHANGE UP
EOSINOPHIL # BLD AUTO: 0.05 K/UL — SIGNIFICANT CHANGE UP (ref 0–0.5)
EOSINOPHIL NFR BLD AUTO: 0.6 % — SIGNIFICANT CHANGE UP (ref 0–6)
EPI CELLS # UR: 1 /HPF — SIGNIFICANT CHANGE UP
GAS PNL BLDV: 140 MMOL/L — SIGNIFICANT CHANGE UP (ref 135–145)
GAS PNL BLDV: SIGNIFICANT CHANGE UP
GAS PNL BLDV: SIGNIFICANT CHANGE UP
GLUCOSE BLDV-MCNC: 96 MG/DL — SIGNIFICANT CHANGE UP (ref 70–99)
GLUCOSE SERPL-MCNC: 96 MG/DL — SIGNIFICANT CHANGE UP (ref 70–99)
GLUCOSE UR QL: NEGATIVE — SIGNIFICANT CHANGE UP
HCG SERPL-ACNC: <2 MIU/ML — SIGNIFICANT CHANGE UP
HCO3 BLDV-SCNC: 29 MMOL/L — SIGNIFICANT CHANGE UP (ref 21–29)
HCT VFR BLD CALC: 40.3 % — SIGNIFICANT CHANGE UP (ref 34.5–45)
HCT VFR BLDA CALC: 42 % — SIGNIFICANT CHANGE UP (ref 39–50)
HGB BLD CALC-MCNC: 13.6 G/DL — SIGNIFICANT CHANGE UP (ref 11.5–15.5)
HGB BLD-MCNC: 13.1 G/DL — SIGNIFICANT CHANGE UP (ref 11.5–15.5)
HYALINE CASTS # UR AUTO: 0 /LPF — SIGNIFICANT CHANGE UP (ref 0–2)
IMM GRANULOCYTES NFR BLD AUTO: 0.5 % — SIGNIFICANT CHANGE UP (ref 0–1.5)
KETONES UR-MCNC: NEGATIVE — SIGNIFICANT CHANGE UP
LACTATE BLDV-MCNC: 1 MMOL/L — SIGNIFICANT CHANGE UP (ref 0.7–2)
LEUKOCYTE ESTERASE UR-ACNC: NEGATIVE — SIGNIFICANT CHANGE UP
LIDOCAIN IGE QN: 25 U/L — SIGNIFICANT CHANGE UP (ref 7–60)
LYMPHOCYTES # BLD AUTO: 2.48 K/UL — SIGNIFICANT CHANGE UP (ref 1–3.3)
LYMPHOCYTES # BLD AUTO: 28.1 % — SIGNIFICANT CHANGE UP (ref 13–44)
MCHC RBC-ENTMCNC: 27.7 PG — SIGNIFICANT CHANGE UP (ref 27–34)
MCHC RBC-ENTMCNC: 32.5 GM/DL — SIGNIFICANT CHANGE UP (ref 32–36)
MCV RBC AUTO: 85.2 FL — SIGNIFICANT CHANGE UP (ref 80–100)
MONOCYTES # BLD AUTO: 0.51 K/UL — SIGNIFICANT CHANGE UP (ref 0–0.9)
MONOCYTES NFR BLD AUTO: 5.8 % — SIGNIFICANT CHANGE UP (ref 2–14)
NEUTROPHILS # BLD AUTO: 5.69 K/UL — SIGNIFICANT CHANGE UP (ref 1.8–7.4)
NEUTROPHILS NFR BLD AUTO: 64.5 % — SIGNIFICANT CHANGE UP (ref 43–77)
NITRITE UR-MCNC: NEGATIVE — SIGNIFICANT CHANGE UP
NRBC # BLD: 0 /100 WBCS — SIGNIFICANT CHANGE UP (ref 0–0)
PCO2 BLDV: 54 MMHG — HIGH (ref 35–50)
PH BLDV: 7.35 — SIGNIFICANT CHANGE UP (ref 7.35–7.45)
PH UR: 6.5 — SIGNIFICANT CHANGE UP (ref 5–8)
PLATELET # BLD AUTO: 256 K/UL — SIGNIFICANT CHANGE UP (ref 150–400)
PO2 BLDV: 29 MMHG — SIGNIFICANT CHANGE UP (ref 25–45)
POTASSIUM BLDV-SCNC: 4 MMOL/L — SIGNIFICANT CHANGE UP (ref 3.5–5.3)
POTASSIUM SERPL-MCNC: 4 MMOL/L — SIGNIFICANT CHANGE UP (ref 3.5–5.3)
POTASSIUM SERPL-SCNC: 4 MMOL/L — SIGNIFICANT CHANGE UP (ref 3.5–5.3)
PROT SERPL-MCNC: 6.8 G/DL — SIGNIFICANT CHANGE UP (ref 6–8.3)
PROT UR-MCNC: NEGATIVE — SIGNIFICANT CHANGE UP
RBC # BLD: 4.73 M/UL — SIGNIFICANT CHANGE UP (ref 3.8–5.2)
RBC # FLD: 12.9 % — SIGNIFICANT CHANGE UP (ref 10.3–14.5)
RBC CASTS # UR COMP ASSIST: 1 /HPF — SIGNIFICANT CHANGE UP (ref 0–4)
SAO2 % BLDV: 49 % — LOW (ref 67–88)
SODIUM SERPL-SCNC: 140 MMOL/L — SIGNIFICANT CHANGE UP (ref 135–145)
SP GR SPEC: 1 — LOW (ref 1.01–1.02)
UROBILINOGEN FLD QL: NEGATIVE — SIGNIFICANT CHANGE UP
WBC # BLD: 8.81 K/UL — SIGNIFICANT CHANGE UP (ref 3.8–10.5)
WBC # FLD AUTO: 8.81 K/UL — SIGNIFICANT CHANGE UP (ref 3.8–10.5)
WBC UR QL: 0 /HPF — SIGNIFICANT CHANGE UP (ref 0–5)

## 2020-09-14 PROCEDURE — 84484 ASSAY OF TROPONIN QUANT: CPT

## 2020-09-14 PROCEDURE — 82435 ASSAY OF BLOOD CHLORIDE: CPT

## 2020-09-14 PROCEDURE — 93010 ELECTROCARDIOGRAM REPORT: CPT

## 2020-09-14 PROCEDURE — 85025 COMPLETE CBC W/AUTO DIFF WBC: CPT

## 2020-09-14 PROCEDURE — 82803 BLOOD GASES ANY COMBINATION: CPT

## 2020-09-14 PROCEDURE — 74176 CT ABD & PELVIS W/O CONTRAST: CPT | Mod: 26

## 2020-09-14 PROCEDURE — 84295 ASSAY OF SERUM SODIUM: CPT

## 2020-09-14 PROCEDURE — 99285 EMERGENCY DEPT VISIT HI MDM: CPT

## 2020-09-14 PROCEDURE — 70450 CT HEAD/BRAIN W/O DYE: CPT | Mod: 26

## 2020-09-14 PROCEDURE — 71045 X-RAY EXAM CHEST 1 VIEW: CPT | Mod: 26

## 2020-09-14 PROCEDURE — 81001 URINALYSIS AUTO W/SCOPE: CPT

## 2020-09-14 PROCEDURE — 99284 EMERGENCY DEPT VISIT MOD MDM: CPT | Mod: 25

## 2020-09-14 PROCEDURE — 36415 COLL VENOUS BLD VENIPUNCTURE: CPT

## 2020-09-14 PROCEDURE — 93005 ELECTROCARDIOGRAM TRACING: CPT

## 2020-09-14 PROCEDURE — 85018 HEMOGLOBIN: CPT

## 2020-09-14 PROCEDURE — 82947 ASSAY GLUCOSE BLOOD QUANT: CPT

## 2020-09-14 PROCEDURE — 82330 ASSAY OF CALCIUM: CPT

## 2020-09-14 PROCEDURE — 83605 ASSAY OF LACTIC ACID: CPT

## 2020-09-14 PROCEDURE — 85014 HEMATOCRIT: CPT

## 2020-09-14 PROCEDURE — 80053 COMPREHEN METABOLIC PANEL: CPT

## 2020-09-14 PROCEDURE — 70450 CT HEAD/BRAIN W/O DYE: CPT

## 2020-09-14 PROCEDURE — 86140 C-REACTIVE PROTEIN: CPT

## 2020-09-14 PROCEDURE — 83690 ASSAY OF LIPASE: CPT

## 2020-09-14 PROCEDURE — 84702 CHORIONIC GONADOTROPIN TEST: CPT

## 2020-09-14 PROCEDURE — 84132 ASSAY OF SERUM POTASSIUM: CPT

## 2020-09-14 PROCEDURE — 71045 X-RAY EXAM CHEST 1 VIEW: CPT

## 2020-09-14 PROCEDURE — 74176 CT ABD & PELVIS W/O CONTRAST: CPT

## 2020-09-14 PROCEDURE — 85652 RBC SED RATE AUTOMATED: CPT

## 2020-09-14 RX ORDER — ACETAMINOPHEN 500 MG
975 TABLET ORAL ONCE
Refills: 0 | Status: COMPLETED | OUTPATIENT
Start: 2020-09-14 | End: 2020-09-14

## 2020-09-14 RX ORDER — ONDANSETRON 8 MG/1
4 TABLET, FILM COATED ORAL ONCE
Refills: 0 | Status: COMPLETED | OUTPATIENT
Start: 2020-09-14 | End: 2020-09-14

## 2020-09-14 RX ORDER — SODIUM CHLORIDE 9 MG/ML
1000 INJECTION, SOLUTION INTRAVENOUS ONCE
Refills: 0 | Status: COMPLETED | OUTPATIENT
Start: 2020-09-14 | End: 2020-09-14

## 2020-09-14 RX ORDER — ONDANSETRON 8 MG/1
4 TABLET, FILM COATED ORAL ONCE
Refills: 0 | Status: DISCONTINUED | OUTPATIENT
Start: 2020-09-14 | End: 2020-09-14

## 2020-09-14 NOTE — ED PROVIDER NOTE - NSFOLLOWUPINSTRUCTIONS_ED_ALL_ED_FT
You were seen today for abdominal pain. You have a slightly enlarged Left ovarian cyst- this needs follow up with an OBGYN for re-evaluation with a dedicated ultrasound.     Return for severe worsening of pain, fevers, bleeding, or fainting, or any new or worsening symptoms.     Follow up with your GI doctor, Dr Villareal within the week.     Acute Abdominal Pain    WHAT YOU NEED TO KNOW:    The cause of your abdominal pain may not be found. If a cause is found, treatment will depend on what the cause is.     DISCHARGE INSTRUCTIONS:    Return to the emergency department if:     You vomit blood or cannot stop vomiting.      You have blood in your bowel movement or it looks like tar.       You have bleeding from your rectum.       Your abdomen is larger than usual, more painful, and hard.       You have severe pain in your abdomen.       You stop passing gas and having bowel movements.       You feel weak, dizzy, or faint.    Contact your healthcare provider if:     You have a fever.      You have new signs and symptoms.      Your symptoms do not get better with treatment.       You have questions or concerns about your condition or care.    Medicines may be given to decrease pain, treat an infection, and manage your symptoms. Take your medicine as directed. Call your healthcare provider if you think your medicine is not helping or if you have side effects. Tell him if you are allergic to any medicine. Keep a list of the medicines, vitamins, and herbs you take. Include the amounts, and when and why you take them. Bring the list or the pill bottles to follow-up visits. Carry your medicine list with you in case of an emergency.    Manage your symptoms:     Apply heat on your abdomen for 20 to 30 minutes every 2 hours for as many days as directed. Heat helps decrease pain and muscle spasms.       Manage your stress. Stress may cause abdominal pain. Your healthcare provider may recommend relaxation techniques and deep breathing exercises to help decrease your stress. Your healthcare provider may recommend you talk to someone about your stress or anxiety, such as a counselor or a trusted friend. Get plenty of sleep and exercise regularly.       Limit or do not drink alcohol. Alcohol can make your abdominal pain worse. Ask your healthcare provider if it is safe for you to drink alcohol. Also ask how much is safe for you to drink.       Do not smoke. Nicotine and other chemicals in cigarettes can damage your esophagus and stomach. Ask your healthcare provider for information if you currently smoke and need help to quit. E-cigarettes or smokeless tobacco still contain nicotine. Talk to your healthcare provider before you use these products.     Make changes to the food you eat as directed: Do not eat foods that cause abdominal pain or other symptoms. Eat small meals more often.     Eat more high-fiber foods if you are constipated. High-fiber foods include fruits, vegetables, whole-grain foods, and legumes.       Do not eat foods that cause gas if you have bloating. Examples include broccoli, cabbage, and cauliflower. Do not drink soda or carbonated drinks, because these may also cause gas.       Do not eat foods or drinks that contain sorbitol or fructose if you have diarrhea and bloating. Some examples are fruit juices, candy, jelly, and sugar-free gum.       Do not eat high-fat foods, such as fried foods, cheeseburgers, hot dogs, and desserts.      Limit or do not drink caffeine. Caffeine may make symptoms, such as heart burn or nausea, worse.       Drink plenty of liquids to prevent dehydration from diarrhea or vomiting. Ask your healthcare provider how much liquid to drink each day and which liquids are best for you.     Follow up with your healthcare provider as directed: Write down your questions so you remember to ask them during your visits.

## 2020-09-14 NOTE — ED PROVIDER NOTE - PHYSICAL EXAMINATION
Gen: AAOx3, NAD, well appearing, conversant, overweight  Head: NCAT  ENT: Airway patent, moist mucous membranes, nasal passageways clear   Cardiac: Normal rate, normal rhythm, no murmurs   Respiratory: Lungs CTA B/L  Gastrointestinal: Abdomen soft, + mild TTP LLQ, LUQ, mid epigastrium, nondistended, no rebound, no guarding   MSK: No gross abnormalities, FROM of all four extremities, no edema  HEME: Extremities warm, pulses intact and symmetrical in all four extremities  Skin: No rashes, no lesions  Neuro: No gross neurologic deficits, CN II-XII intact, no facial asymmetry, no dysarthria, no dysmetria, no sensory deficits, no drift, EOMI, b/l PERRLa, no nystagmus, strength equal in all four extremities, no gait abnormality

## 2020-09-14 NOTE — ED PROVIDER NOTE - PROGRESS NOTE DETAILS
Brizuela DO: spoke to pts GI Dr Zackary Villareal, knows patient well, pt becomes anxious regarding symptoms, pt just seen in the office last week, labs discussed, he agrees that oral contrast ct a/p should be sufficient, discussed w/ pt that IV contrast study may provide better information that an oral contrast only scan may not provide, pt does not want premedication/extended wait in ED for ct a/p w/ IV contrast. Exam not significantly concerning and labs without significant elevation of inflammatory markers- will proceed w/ ct w/ oral only to ensure no abnormalities. Brizuela DO: spoke to pts GI Dr Zackary Villareal, knows patient well, pt becomes anxious regarding symptoms, pt just seen in the office last week, labs discussed, he agrees that oral contrast ct a/p should be sufficient, discussed w/ pt that IV contrast study may provide better information that an oral contrast only scan may not provide, pt does not want premedication/extended wait in ED for ct a/p w/ IV contrast. Exam not significantly concerning and labs without significant elevation of inflammatory markers- will proceed w/ ct w/ oral only to ensure no abnormalities. Pt reports light headedness resolved. Reports she saw Dr Jr Sevilla in the office last week and had TTE performed- spoke to Dr Sevilla and TTE reportedly wnl. Brizuela DO: pts exam stable throughout, no abd pain at rest, mild LLQ ttp with palpation, otherwise, well appearing, instructed on GI and GYN f/u. pt informed of enlarged L cyst, will need outpt eval, exam not consistent with torsion. Ale Arizmendi M.D. Resident  No significant finding on CT head and abd pelvis. Pt verbalized that she is aware of enlarging L ovarian cyst, and states that she follows regularly with her OB, most recent TVUS was 6 months ago. Pt aware of otherwise negative results, agrees to follow up with GI, PMD and OBGYN.

## 2020-09-14 NOTE — ED ADULT NURSE NOTE - OBJECTIVE STATEMENT
50y f pt with extensive medical hx arrived to room in wheelchair; pt able to transfer self to stretcher; pt c/o episode of dizziness and weak legs this am; also nausea and abd pain; pt states pain is not in usual area of abd; pt states has had vertigo and this feeling was not like that either; pt indicates pain above umbilicus and to lower left of umbilicus; aox3; no resp distress; no chest pain but does endorse palpitations earlier; no vomiting; 4 episodes of loose stool; no bm today; no dysuria/hematuria; no blood in stool; denies fever/chills/cough/congestion; iv placed; labs drawn; ekg done; pt placed on cardiac monitor in nsr; call bell within reach; safety/comfort maintained

## 2020-09-14 NOTE — ED PROVIDER NOTE - NSFOLLOWUPCLINICS_GEN_ALL_ED_FT
An OB/GYN physician  Obstetrics & Gynecology  .  [place]   Phone:   Fax:   Follow Up Time: 4-6 Days

## 2020-09-14 NOTE — ED PROVIDER NOTE - OBJECTIVE STATEMENT
50F hx of crohn's disease on remicade infusions (last 3 wks ago), cholecystectomy, endometrial polyp removal, pituitary adenoma s/p excision (non secretory), presenting w/ 1 day of nausea, generalized weakness, feeling fatigued and sensation of near fainting since awakening this morning. States does not feel like her prior episodes of vertigo. Feels that she 'may be getting a flare.' Had similar symptoms 3 weeks ago and in March. States it was associated with a flare. However, she had been constipated x last few weeks but over the last 3-4 days has been having nonbloody loose stools after each meal. No chest pain. Chronic back pain, no LE paresthesias. No urinary complaints. No fevers. Reports dull headache. Reports mild LLQ abd pain 50F hx of crohn's disease on remicade infusions (last 3 wks ago), cholecystectomy, endometrial polyp removal, pituitary adenoma s/p excision (non secretory), presenting w/ 1 day of nausea, generalized weakness, feeling fatigued and sensation of near fainting since awakening this morning. States does not feel like her prior episodes of vertigo. Feels that she 'may be getting a flare.' Had similar symptoms 3 weeks ago and in March. States it was associated with a flare. However, she had been constipated x last few weeks but over the last 3-4 days has been having nonbloody loose stools after each meal. No chest pain. Chronic back pain, no LE paresthesias. No urinary complaints. No fevers. Reports dull headache. Reports mild LLQ abd pain. + nausea, no vomiting. Saw cardiologist 3 weeks ago. 50F hx of crohn's disease on remicade infusions (last 3 wks ago), cholecystectomy, endometrial polyp removal, pituitary adenoma s/p excision (non secretory), presenting w/ 1 day of nausea, generalized weakness, feeling fatigued and sensation of near fainting since awakening this morning. States does not feel like her prior episodes of vertigo. Feels that she 'may be getting a flare.' Had similar symptoms 3 weeks ago and in March. States it was associated with a flare. However, she had been constipated x last few weeks but over the last 3-4 days has been having nonbloody loose stools after each meal. No chest pain. Chronic back pain, no LE paresthesias. No urinary complaints. No fevers. Reports dull headache. Reports mild LLQ abd pain not present at rest but there when applying pressure. + nausea, no vomiting. Saw cardiologist 3 weeks ago. 50F hx of crohn's disease on remicade infusions (last 3 wks ago), cholecystectomy, endometrial polyp removal, pituitary adenoma s/p excision (non secretory), presenting w/ 1 day of nausea, generalized weakness, feeling fatigued and sensation of near fainting since awakening this morning at 6am. States does not feel like her prior episodes of vertigo. Feels that she 'may be getting a flare.' Had similar symptoms 3 weeks ago and in March. States it was associated with a flare. However, she had been constipated x last few weeks but over the last 3-4 days has been having nonbloody loose stools after each meal. No chest pain. Chronic back pain, no LE paresthesias. No urinary complaints. No fevers. Reports dull headache. Reports mild LLQ abd pain not present at rest but there when applying pressure. + nausea, no vomiting. Saw cardiologist 3 weeks ago. Reports sensation of rectal pressure as well.

## 2020-09-14 NOTE — ED PROVIDER NOTE - PATIENT PORTAL LINK FT
You can access the FollowMyHealth Patient Portal offered by [facility] by registering at the following website: [email or web address]. By joining [employer]’s FollowMyHealth portal, you will also be able to view your health information using other applications (apps) compatible with our system.

## 2020-09-14 NOTE — ED ADULT TRIAGE NOTE - NS ED NURSE DIRECT TO ROOM YN
Patient says he's been taking tamsulosin (FLOMAX) 0.4 MG Cap, for years, taking 2 daily prescribed from his PCP.  Dr pérez filled it at his last visit 2/10, prescribed as 1 daily.  Patient just noticed this, and he has still been taking 2.  Should he just be taking 1?  Call patient to confirm.  
Returned pt.'s phone call to discuss his Flomax rx. Pt. States he had taking Flomax 0.8mg daily for years when his PCP was prescribing it for him. Pt. Denies any difficulties with this including dizziness. Dr. Recio is agreeable to keeping him at the 0.8mg. will send refill to [facility] per pt request. No further questions at this time.   
No

## 2020-09-14 NOTE — ED ADULT TRIAGE NOTE - CHIEF COMPLAINT QUOTE
This morning I woke up feeling weak and shaky with diarrhea since yesterday, pt has Hx of Crohn's. "This morning I woke up feeling weak and shaky with diarrhea since yesterday", pt has Hx of Crohn's.

## 2020-09-14 NOTE — ED PROVIDER NOTE - CARE PROVIDER_API CALL
Zackary Villareal  GASTROENTEROLOGY  [address] [id number]  Phone: [phone]  Fax: [phone]  Follow Up Time: 4-6 Days

## 2020-09-14 NOTE — ED PROVIDER NOTE - CLINICAL SUMMARY MEDICAL DECISION MAKING FREE TEXT BOX
Brizuela DO: 50F hx crohn's dz p/w sensation of near fainting, generalized weakness, no neuro deficits on exam, gait wnl, well appearing, mild TTP LLQ, will eval trops/ ekg to eval for cardiac etiology, but less likely, suspect dehydration from recent diarrhea, will check inflammatory markers, plan for supportive care, eval for electrolyte abnormalities, reassess and possible dispo home. Brizuela DO: 50F hx crohn's dz p/w sensation of near fainting, generalized weakness, no neuro deficits on exam, gait wnl, well appearing, mild TTP LLQ, will eval trops/ ekg to eval for cardiac etiology, but less likely, suspect dehydration from recent diarrhea, will check inflammatory markers, plan for supportive care, eval for electrolyte abnormalities, reassess and possible dispo home. If abd pain persistent then will consider imaging to eval for crohn's flare or if light headedness unremitting then will obtain ct head as pt has hx of pituitary adenoma excision.

## 2020-09-14 NOTE — ED ADULT NURSE NOTE - NSFALLRSKASSESSDT_ED_ALL_ED
Handoff     Primary Team: Networked reference to record PCT  Room Number: 951/951 A     Patient Name: Sisi Medel MRN: [id number]     Date of Birth: [id number] Allergies: Bactrim [sulfamethoxazole-trimethoprim] and Nsaids (non-steroidal anti-inflammatory drug)     Age: 56 y.o. Admit Date: 2/4/2017     Sex: female  BMI: Body mass index is 40.22 kg/(m^2).     Code Status: Full Code        Illness Level (current clinical status): Watcher - No    Reason for Admission: Hypotension    Brief HPI (pertinent PMH and diagnosis or differential diagnosis): \  Ms. Sisi Medel is a 56 y.o. 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  states that she went for dialysis today for HD but her AV fistula clotted and unable to perform HD and went home. She is brought by her mother because she is feeling fatigue and generally weak. She states that no attempt to de-clot AV fistula at dialysis center. Patient reports that her BP always low but not sure why, she takes midodrine 15 mg before dialysis that she took this am. She you cough, SOB, fever, chills or diarrhea.       Procedure Date: 2/4/17 RIJ trialysis     Hospital Course (updated, brief assessment by system or problem, significant events):   Hypotension:   - DDx : Hypotension , adrenal insufficiency vs vasovagal vs sepsis    infectious cause less likely as patient afebrile , no leukocytosis, but cannot ruled out. She received vancomycin and zosyn. Antibiotic discontinued due to negative cultures.   most likely related to relative AI   Cortisol; 6/16 was 3.1 , patient on florinef 0.1 mg OD ( no diagnosis of AI and patient thinks that  she still takes the Florinef ), repeated cortisol 11   Started on hydrocortisone 100 mg q8. Discontinued IV steroids and started 20 mg PO prednisone   Will need endocrine follow up on discharge     Malfunction AV fistula   Vascular surgery consulted , plan for intervention on Tuesday.     Pulmonary sarcoidosis  - hx of pulmonary sarcoidosis on home oxygen (4-5 L ), follows with .   - on prednisone 10 mg OD at home . Now on prednisone 20 mg PO , wean steroids to home dose 10 mg OD before discharge and follow up with Dr. Harvey.     Paroxysmal atrial fibrillation  - hx of tachy /mauro syndrome s/p pacemaker   - on coumadin at home , cont hold coumadin in anticipation for procedure for clotted AV fistula.       ESRD on hemodialysis  - ESRD on HD TTS for almost a year   - nephrology is following. Received CRRT BP improved with midodrine and hydrocortisone.   - Midodrine 10 mg every TTS before HD per nephrology     Seizure disorder  - on Keppra 500 bid at home , will continue.    Tasks (specific, using if-then statements): as above , follow up with vascular surgery for AV fistula malfunction.     Contingency Plan (special circumstances anticipated and plan): as above     Estimated Discharge Date: 2/7/17- 2/8/17     Discharge Disposition: Home or Self Care    Mentored By: Dr. Soto.      14-Sep-2020 11:52

## 2020-09-14 NOTE — ED ADULT NURSE NOTE - CHIEF COMPLAINT QUOTE
"This morning I woke up feeling weak and shaky with diarrhea since yesterday", pt has Hx of Crohn's.

## 2020-09-14 NOTE — ED PROVIDER NOTE - NS ED ROS FT
Gen: No fever, normal appetite, + generalized weakness   Eyes: No eye irritation or discharge  ENT: No ear pain, congestion, sore throat  Resp: No cough or trouble breathing  Cardiovascular: No chest pain or palpitation  Gastroenteric: +nausea, + diarrhea, + abd pain, no vomiting  :  No change in urine output; no dysuria  MS: No joint or muscle pain  Skin: No rashes  Neuro: + dull headache; no abnormal movements  Remainder negative, except as per the HPI

## 2020-09-15 LAB — ERYTHROCYTE [SEDIMENTATION RATE] IN BLOOD: 17 MM/HR — HIGH (ref 0–15)

## 2020-09-21 ENCOUNTER — APPOINTMENT (OUTPATIENT)
Dept: COLORECTAL SURGERY | Facility: CLINIC | Age: 50
End: 2020-09-21

## 2020-10-05 ENCOUNTER — NON-APPOINTMENT (OUTPATIENT)
Age: 50
End: 2020-10-05

## 2020-10-05 ENCOUNTER — APPOINTMENT (OUTPATIENT)
Dept: CARDIOLOGY | Facility: CLINIC | Age: 50
End: 2020-10-05
Payer: COMMERCIAL

## 2020-10-05 ENCOUNTER — LABORATORY RESULT (OUTPATIENT)
Age: 50
End: 2020-10-05

## 2020-10-05 ENCOUNTER — APPOINTMENT (OUTPATIENT)
Dept: CARDIOLOGY | Facility: CLINIC | Age: 50
End: 2020-10-05

## 2020-10-05 VITALS
BODY MASS INDEX: 38.99 KG/M2 | RESPIRATION RATE: 16 BRPM | SYSTOLIC BLOOD PRESSURE: 128 MMHG | DIASTOLIC BLOOD PRESSURE: 78 MMHG | HEART RATE: 70 BPM | WEIGHT: 234 LBS | HEIGHT: 65 IN

## 2020-10-05 DIAGNOSIS — R07.89 OTHER CHEST PAIN: ICD-10-CM

## 2020-10-05 PROCEDURE — 93224 XTRNL ECG REC UP TO 48 HRS: CPT

## 2020-10-05 PROCEDURE — 99214 OFFICE O/P EST MOD 30 MIN: CPT

## 2020-10-05 PROCEDURE — 93000 ELECTROCARDIOGRAM COMPLETE: CPT | Mod: 59

## 2020-10-12 ENCOUNTER — APPOINTMENT (OUTPATIENT)
Dept: CARDIOLOGY | Facility: CLINIC | Age: 50
End: 2020-10-12
Payer: COMMERCIAL

## 2020-10-12 VITALS
RESPIRATION RATE: 15 BRPM | WEIGHT: 235 LBS | DIASTOLIC BLOOD PRESSURE: 80 MMHG | SYSTOLIC BLOOD PRESSURE: 130 MMHG | HEART RATE: 68 BPM | BODY MASS INDEX: 39.15 KG/M2 | HEIGHT: 65 IN

## 2020-10-12 DIAGNOSIS — Z87.891 PERSONAL HISTORY OF NICOTINE DEPENDENCE: ICD-10-CM

## 2020-10-12 PROCEDURE — 93015 CV STRESS TEST SUPVJ I&R: CPT

## 2020-10-12 PROCEDURE — 99213 OFFICE O/P EST LOW 20 MIN: CPT | Mod: 25

## 2020-10-12 PROCEDURE — 0296T: CPT

## 2020-11-02 ENCOUNTER — APPOINTMENT (OUTPATIENT)
Dept: CARDIOLOGY | Facility: CLINIC | Age: 50
End: 2020-11-02
Payer: COMMERCIAL

## 2020-11-02 PROCEDURE — 0298T: CPT
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
## 2020-11-22 ENCOUNTER — TRANSCRIPTION ENCOUNTER (OUTPATIENT)
Age: 50
End: 2020-11-22

## 2020-12-15 PROBLEM — Z87.898 HISTORY OF VAGINAL INFECTION: Status: RESOLVED | Noted: 2017-11-03 | Resolved: 2020-12-15

## 2020-12-15 PROBLEM — N76.0 GARDNERELLA VAGINALIS INFECTION: Status: RESOLVED | Noted: 2017-10-27 | Resolved: 2020-12-15

## 2020-12-28 ENCOUNTER — APPOINTMENT (OUTPATIENT)
Dept: CARDIOLOGY | Facility: CLINIC | Age: 50
End: 2020-12-28

## 2021-02-23 ENCOUNTER — APPOINTMENT (OUTPATIENT)
Dept: CARDIOLOGY | Facility: CLINIC | Age: 51
End: 2021-02-23

## 2021-09-07 ENCOUNTER — APPOINTMENT (OUTPATIENT)
Dept: SURGERY | Facility: CLINIC | Age: 51
End: 2021-09-07
Payer: COMMERCIAL

## 2021-09-07 VITALS
DIASTOLIC BLOOD PRESSURE: 82 MMHG | RESPIRATION RATE: 16 BRPM | BODY MASS INDEX: 39.99 KG/M2 | HEIGHT: 65 IN | WEIGHT: 240 LBS | SYSTOLIC BLOOD PRESSURE: 120 MMHG | OXYGEN SATURATION: 97 % | HEART RATE: 78 BPM | TEMPERATURE: 97.7 F

## 2021-09-07 DIAGNOSIS — K62.89 OTHER SPECIFIED DISEASES OF ANUS AND RECTUM: ICD-10-CM

## 2021-09-07 DIAGNOSIS — M62.838 OTHER MUSCLE SPASM: ICD-10-CM

## 2021-09-07 PROCEDURE — 99243 OFF/OP CNSLTJ NEW/EST LOW 30: CPT | Mod: 25

## 2021-09-07 PROCEDURE — 46600 DIAGNOSTIC ANOSCOPY SPX: CPT

## 2021-09-07 NOTE — HISTORY OF PRESENT ILLNESS
[FreeTextEntry1] : Evans is a 51 year old female here evaluation of rectal pain. She has a h/o Crohn's disease, diagnosed in 2012. She is currently taking Remicade. She typically has 1-2 formed BMs a day. Occasionally alternated between bouts of diarrhea and constipation. She takes Miralax PRN for constipation. \par On Tuesday she developed rectal pain and perianal swelling following a bout of diarrhea. She did sitz baths with minimal relief. Pain and perianal swelling improved today. She has never had abdominal surgery. Not taking any anticoagulants. Denies family history of colon/rectal cancer. \par Colonoscopy from 9/18/20 demonstrates non-bleeding internal hemorrhoids. The entire examined colon is normal. The examined portion of he ileum was normal. Biopsies were taken with a cold histology in the ascending colon, in the cecum and in the terminal ileum. Pathology: colonic mucosa with no pathological diagnosis. Negative for active inflammation. There is no evidence of dysplasia or granuloma.

## 2021-09-07 NOTE — PHYSICAL EXAM
[Normal Breath Sounds] : Normal breath sounds [Normal Heart Sounds] : normal heart sounds [No Rash or Lesion] : No rash or lesion [Alert] : alert [Oriented to Person] : oriented to person [Oriented to Place] : oriented to place [Oriented to Time] : oriented to time [Calm] : calm [Abdomen Masses] : No abdominal masses [Abdomen Tenderness] : ~T No ~M abdominal tenderness [JVD] : no jugular venous distention  [No HSM] : no hepatosplenomegaly [de-identified] : Perianal inspection digital rectal examination and anoscopy reveal no evidence of thrombosed external hemorrhoid.  There is no fissure fistula or abscess.  There is no focal tenderness on digital rectal examination.  Anoscopy is normal [de-identified] : WNL [de-identified] : WNL [de-identified] : Full ROM

## 2021-09-07 NOTE — ASSESSMENT
[FreeTextEntry1] : I, Fallon Angelo NP, attest I was present throughout the visit. \par \par In summary the patient had rectal discomfort last week after a bout of diarrhea.  She has longstanding Crohn's disease under control with medication.  There is no evidence of fissure fistula abscess or thrombosed external hemorrhoid on examination in the office today.  She may have had an element of sphincter spasm or may have had a superficial anal fissure which has subsequently healed over the past week.  Either way I do not feel that any further treatment or work-up is indicated.  I instructed her to call should she develop recurrent pain or swelling in the area

## 2021-09-07 NOTE — CONSULT LETTER
[Dear  ___] : Dear  [unfilled], [Consult Letter:] : I had the pleasure of evaluating your patient, [unfilled]. [Please see my note below.] : Please see my note below. [Consult Closing:] : Thank you very much for allowing me to participate in the care of this patient.  If you have any questions, please do not hesitate to contact me. [Sincerely,] : Sincerely, [FreeTextEntry3] : Rey Chapman M.D., F.A.C.S, F.A.S.C.R.S [DrRen  ___] : Dr. LORD

## 2021-12-31 ENCOUNTER — INPATIENT (INPATIENT)
Facility: HOSPITAL | Age: 51
LOS: 2 days | Discharge: HOME | DRG: 872 | End: 2022-01-03
Attending: INTERNAL MEDICINE | Admitting: INTERNAL MEDICINE
Payer: COMMERCIAL

## 2021-12-31 VITALS
HEIGHT: 65 IN | OXYGEN SATURATION: 96 % | RESPIRATION RATE: 20 BRPM | TEMPERATURE: 98 F | WEIGHT: 240.08 LBS | SYSTOLIC BLOOD PRESSURE: 117 MMHG | HEART RATE: 115 BPM | DIASTOLIC BLOOD PRESSURE: 76 MMHG

## 2021-12-31 DIAGNOSIS — I82.90 ACUTE EMBOLISM AND THROMBOSIS OF UNSPECIFIED VEIN: Chronic | ICD-10-CM

## 2021-12-31 DIAGNOSIS — Z98.89 OTHER SPECIFIED POSTPROCEDURAL STATES: Chronic | ICD-10-CM

## 2021-12-31 DIAGNOSIS — K50.90 CROHN'S DISEASE, UNSPECIFIED, WITHOUT COMPLICATIONS: ICD-10-CM

## 2021-12-31 DIAGNOSIS — Z86.39 PERSONAL HISTORY OF OTHER ENDOCRINE, NUTRITIONAL AND METABOLIC DISEASE: Chronic | ICD-10-CM

## 2021-12-31 LAB
ALBUMIN SERPL ELPH-MCNC: 4.2 G/DL — SIGNIFICANT CHANGE UP (ref 3.3–5)
ALP SERPL-CCNC: 97 U/L — SIGNIFICANT CHANGE UP (ref 40–120)
ALT FLD-CCNC: 17 U/L — SIGNIFICANT CHANGE UP (ref 10–45)
ANION GAP SERPL CALC-SCNC: 13 MMOL/L — SIGNIFICANT CHANGE UP (ref 5–17)
APPEARANCE UR: CLEAR — SIGNIFICANT CHANGE UP
AST SERPL-CCNC: 20 U/L — SIGNIFICANT CHANGE UP (ref 10–40)
BACTERIA # UR AUTO: NEGATIVE — SIGNIFICANT CHANGE UP
BASE EXCESS BLDV CALC-SCNC: 1 MMOL/L — SIGNIFICANT CHANGE UP (ref -2–2)
BASOPHILS # BLD AUTO: 0.02 K/UL — SIGNIFICANT CHANGE UP (ref 0–0.2)
BASOPHILS NFR BLD AUTO: 0.1 % — SIGNIFICANT CHANGE UP (ref 0–2)
BILIRUB SERPL-MCNC: 0.4 MG/DL — SIGNIFICANT CHANGE UP (ref 0.2–1.2)
BILIRUB UR-MCNC: NEGATIVE — SIGNIFICANT CHANGE UP
BUN SERPL-MCNC: 12 MG/DL — SIGNIFICANT CHANGE UP (ref 7–23)
C DIFF GDH STL QL: NEGATIVE — SIGNIFICANT CHANGE UP
C DIFF GDH STL QL: SIGNIFICANT CHANGE UP
CA-I SERPL-SCNC: 1.14 MMOL/L — LOW (ref 1.15–1.33)
CALCIUM SERPL-MCNC: 8.6 MG/DL — SIGNIFICANT CHANGE UP (ref 8.4–10.5)
CHLORIDE BLDV-SCNC: 105 MMOL/L — SIGNIFICANT CHANGE UP (ref 96–108)
CHLORIDE SERPL-SCNC: 106 MMOL/L — SIGNIFICANT CHANGE UP (ref 96–108)
CO2 BLDV-SCNC: 29 MMOL/L — HIGH (ref 22–26)
CO2 SERPL-SCNC: 21 MMOL/L — LOW (ref 22–31)
COLOR SPEC: SIGNIFICANT CHANGE UP
CREAT SERPL-MCNC: 0.75 MG/DL — SIGNIFICANT CHANGE UP (ref 0.5–1.3)
CRP SERPL-MCNC: 10 MG/L — HIGH (ref 0–4)
DIFF PNL FLD: NEGATIVE — SIGNIFICANT CHANGE UP
EOSINOPHIL # BLD AUTO: 0.01 K/UL — SIGNIFICANT CHANGE UP (ref 0–0.5)
EOSINOPHIL NFR BLD AUTO: 0.1 % — SIGNIFICANT CHANGE UP (ref 0–6)
EPI CELLS # UR: 2 /HPF — SIGNIFICANT CHANGE UP
ERYTHROCYTE [SEDIMENTATION RATE] IN BLOOD: 17 MM/HR — SIGNIFICANT CHANGE UP (ref 0–20)
GAS PNL BLDV: 137 MMOL/L — SIGNIFICANT CHANGE UP (ref 136–145)
GAS PNL BLDV: SIGNIFICANT CHANGE UP
GLUCOSE BLDV-MCNC: 117 MG/DL — HIGH (ref 70–99)
GLUCOSE SERPL-MCNC: 115 MG/DL — HIGH (ref 70–99)
GLUCOSE UR QL: NEGATIVE — SIGNIFICANT CHANGE UP
HCG SERPL-ACNC: <2 MIU/ML — SIGNIFICANT CHANGE UP
HCO3 BLDV-SCNC: 27 MMOL/L — SIGNIFICANT CHANGE UP (ref 22–29)
HCT VFR BLD CALC: 40.3 % — SIGNIFICANT CHANGE UP (ref 34.5–45)
HCT VFR BLDA CALC: 40 % — SIGNIFICANT CHANGE UP (ref 34.5–46.5)
HGB BLD CALC-MCNC: 13.3 G/DL — SIGNIFICANT CHANGE UP (ref 11.7–16.1)
HGB BLD-MCNC: 12.9 G/DL — SIGNIFICANT CHANGE UP (ref 11.5–15.5)
HYALINE CASTS # UR AUTO: 1 /LPF — SIGNIFICANT CHANGE UP (ref 0–2)
IMM GRANULOCYTES NFR BLD AUTO: 0.5 % — SIGNIFICANT CHANGE UP (ref 0–1.5)
KETONES UR-MCNC: ABNORMAL
LACTATE BLDV-MCNC: 1.4 MMOL/L — SIGNIFICANT CHANGE UP (ref 0.7–2)
LEUKOCYTE ESTERASE UR-ACNC: NEGATIVE — SIGNIFICANT CHANGE UP
LIDOCAIN IGE QN: 20 U/L — SIGNIFICANT CHANGE UP (ref 7–60)
LYMPHOCYTES # BLD AUTO: 0.64 K/UL — LOW (ref 1–3.3)
LYMPHOCYTES # BLD AUTO: 4.8 % — LOW (ref 13–44)
MCHC RBC-ENTMCNC: 27 PG — SIGNIFICANT CHANGE UP (ref 27–34)
MCHC RBC-ENTMCNC: 32 GM/DL — SIGNIFICANT CHANGE UP (ref 32–36)
MCV RBC AUTO: 84.3 FL — SIGNIFICANT CHANGE UP (ref 80–100)
MONOCYTES # BLD AUTO: 0.48 K/UL — SIGNIFICANT CHANGE UP (ref 0–0.9)
MONOCYTES NFR BLD AUTO: 3.6 % — SIGNIFICANT CHANGE UP (ref 2–14)
NEUTROPHILS # BLD AUTO: 12.18 K/UL — HIGH (ref 1.8–7.4)
NEUTROPHILS NFR BLD AUTO: 90.9 % — HIGH (ref 43–77)
NITRITE UR-MCNC: NEGATIVE — SIGNIFICANT CHANGE UP
NRBC # BLD: 0 /100 WBCS — SIGNIFICANT CHANGE UP (ref 0–0)
PCO2 BLDV: 48 MMHG — HIGH (ref 39–42)
PH BLDV: 7.36 — SIGNIFICANT CHANGE UP (ref 7.32–7.43)
PH UR: 5.5 — SIGNIFICANT CHANGE UP (ref 5–8)
PLATELET # BLD AUTO: 256 K/UL — SIGNIFICANT CHANGE UP (ref 150–400)
PO2 BLDV: 35 MMHG — SIGNIFICANT CHANGE UP (ref 25–45)
POTASSIUM BLDV-SCNC: 3.8 MMOL/L — SIGNIFICANT CHANGE UP (ref 3.5–5.1)
POTASSIUM SERPL-MCNC: 3.9 MMOL/L — SIGNIFICANT CHANGE UP (ref 3.5–5.3)
POTASSIUM SERPL-SCNC: 3.9 MMOL/L — SIGNIFICANT CHANGE UP (ref 3.5–5.3)
PROT SERPL-MCNC: 7 G/DL — SIGNIFICANT CHANGE UP (ref 6–8.3)
PROT UR-MCNC: SIGNIFICANT CHANGE UP
RBC # BLD: 4.78 M/UL — SIGNIFICANT CHANGE UP (ref 3.8–5.2)
RBC # FLD: 13.5 % — SIGNIFICANT CHANGE UP (ref 10.3–14.5)
RBC CASTS # UR COMP ASSIST: 3 /HPF — SIGNIFICANT CHANGE UP (ref 0–4)
SAO2 % BLDV: 62.1 % — LOW (ref 67–88)
SARS-COV-2 RNA SPEC QL NAA+PROBE: SIGNIFICANT CHANGE UP
SODIUM SERPL-SCNC: 140 MMOL/L — SIGNIFICANT CHANGE UP (ref 135–145)
SP GR SPEC: 1.02 — SIGNIFICANT CHANGE UP (ref 1.01–1.02)
UROBILINOGEN FLD QL: NEGATIVE — SIGNIFICANT CHANGE UP
WBC # BLD: 13.4 K/UL — HIGH (ref 3.8–10.5)
WBC # FLD AUTO: 13.4 K/UL — HIGH (ref 3.8–10.5)
WBC UR QL: 1 /HPF — SIGNIFICANT CHANGE UP (ref 0–5)

## 2021-12-31 PROCEDURE — G1004: CPT

## 2021-12-31 PROCEDURE — 74177 CT ABD & PELVIS W/CONTRAST: CPT | Mod: 26,MG

## 2021-12-31 PROCEDURE — 99285 EMERGENCY DEPT VISIT HI MDM: CPT

## 2021-12-31 RX ORDER — METRONIDAZOLE 500 MG
500 TABLET ORAL ONCE
Refills: 0 | Status: COMPLETED | OUTPATIENT
Start: 2021-12-31 | End: 2021-12-31

## 2021-12-31 RX ORDER — ONDANSETRON 8 MG/1
4 TABLET, FILM COATED ORAL ONCE
Refills: 0 | Status: COMPLETED | OUTPATIENT
Start: 2021-12-31 | End: 2021-12-31

## 2021-12-31 RX ORDER — HYDROCORTISONE 20 MG
200 TABLET ORAL ONCE
Refills: 0 | Status: COMPLETED | OUTPATIENT
Start: 2021-12-31 | End: 2021-12-31

## 2021-12-31 RX ORDER — SODIUM CHLORIDE 9 MG/ML
1000 INJECTION INTRAMUSCULAR; INTRAVENOUS; SUBCUTANEOUS ONCE
Refills: 0 | Status: COMPLETED | OUTPATIENT
Start: 2021-12-31 | End: 2021-12-31

## 2021-12-31 RX ORDER — ACETAMINOPHEN 500 MG
1000 TABLET ORAL ONCE
Refills: 0 | Status: COMPLETED | OUTPATIENT
Start: 2021-12-31 | End: 2021-12-31

## 2021-12-31 RX ORDER — DIPHENHYDRAMINE HCL 50 MG
50 CAPSULE ORAL ONCE
Refills: 0 | Status: COMPLETED | OUTPATIENT
Start: 2021-12-31 | End: 2021-12-31

## 2021-12-31 RX ORDER — CIPROFLOXACIN LACTATE 400MG/40ML
400 VIAL (ML) INTRAVENOUS ONCE
Refills: 0 | Status: COMPLETED | OUTPATIENT
Start: 2021-12-31 | End: 2021-12-31

## 2021-12-31 RX ADMIN — Medication 200 MILLIGRAM(S): at 23:19

## 2021-12-31 RX ADMIN — ONDANSETRON 4 MILLIGRAM(S): 8 TABLET, FILM COATED ORAL at 15:58

## 2021-12-31 RX ADMIN — SODIUM CHLORIDE 1000 MILLILITER(S): 9 INJECTION INTRAMUSCULAR; INTRAVENOUS; SUBCUTANEOUS at 14:35

## 2021-12-31 RX ADMIN — Medication 100 MILLIGRAM(S): at 21:12

## 2021-12-31 RX ADMIN — Medication 400 MILLIGRAM(S): at 12:54

## 2021-12-31 RX ADMIN — Medication 50 MILLIGRAM(S): at 18:23

## 2021-12-31 RX ADMIN — Medication 1000 MILLIGRAM(S): at 13:10

## 2021-12-31 RX ADMIN — SODIUM CHLORIDE 1000 MILLILITER(S): 9 INJECTION INTRAMUSCULAR; INTRAVENOUS; SUBCUTANEOUS at 21:08

## 2021-12-31 RX ADMIN — SODIUM CHLORIDE 1000 MILLILITER(S): 9 INJECTION INTRAMUSCULAR; INTRAVENOUS; SUBCUTANEOUS at 19:22

## 2021-12-31 RX ADMIN — SODIUM CHLORIDE 1000 MILLILITER(S): 9 INJECTION INTRAMUSCULAR; INTRAVENOUS; SUBCUTANEOUS at 12:54

## 2021-12-31 RX ADMIN — Medication 1000 MILLIGRAM(S): at 13:24

## 2021-12-31 RX ADMIN — Medication 200 MILLIGRAM(S): at 15:26

## 2021-12-31 RX ADMIN — ONDANSETRON 4 MILLIGRAM(S): 8 TABLET, FILM COATED ORAL at 12:54

## 2021-12-31 NOTE — ED ADULT NURSE NOTE - NS TRANSFER PATIENT BELONGINGS
Called patient - no answer.   Calling regarding Ova and Parasites: non seen per reports.     Will mail patient a copy as well.     Meghann Vazquez, APRN     Clothing

## 2021-12-31 NOTE — ED PROVIDER NOTE - OBJECTIVE STATEMENT
52 yo female PMhx GERD, Crohn's disease on Remicade, ?CDiff presents to the ED c/o generalized abdominal pain, nausea, vomiting and diarrhea since 2AM.  woke up last night around midnight with severe diarrhea and vomiting. 2 hours later pt developed the same sxs. Reports 2 episodes nb/nb vomiting and multiple episodes of watery diarrhea. Has not been able to tolerate PO. Reports they both ate turkey meatballs for dinner at ~6pm. Currently endorsing generalized abdominal pain and nausea. Denies recent abx use, n/v/d, abd pain, urinary sxs, flank pain, recent travel, numbness/tingling, cp, sob, hematuria.   GI: Rich Peralta

## 2021-12-31 NOTE — ED PROVIDER NOTE - PROGRESS NOTE DETAILS
CT noted, colitis, unclear if infectious or crohn's flare. Call sent out to GI Dr. Sutherland to discuss, however pt w/ persistent sxs and difficulty tolerating PO, does not feel comfortable going home. Will admit. - Trevor Chaudhry PA-C Spoke w/ Dr. Sutherland's office, they state Dr. Raj sloan covering this weekend, spoke w/ Dr. Sloan regarding case and CT results, questioned steroids vs watching, he recommends cipro/flagyl at this time and will see pt in hospital. - HEATHER MorenoC Charito Palma MD, Attending: Patient received at attending sign out from Dr. Stock. CT abd with flare, GI consulted, [facility]

## 2021-12-31 NOTE — ED ADULT NURSE REASSESSMENT NOTE - NS ED NURSE REASSESS COMMENT FT1
Pt is updated to plan of care for pre-medication for CT at this time. Pt has call bell within reach.

## 2021-12-31 NOTE — ED ADULT NURSE NOTE - OBJECTIVE STATEMENT
51F to ED c/o 2am onset of middle abd pain also with liquid diarrhea and n/vomiting. Pt states ate turkey meatballs for dinner last night and  has similar symptoms. Patient has hx of Crohns disease and states that the Crohn's is usually lower in her abd and that this pain feels different. Pt states that the pain this time feels like a pressure. Pt states that she takes Remicade infusions for the hx of Crohn's. Patient is alert and oriented x4, calm/cooperative, NAD, VSS. Patient denies fever, sob, cough, bloody stool or bloody vomit. Pt has 20 gauge IV placed to LAC in ED. Pt is given call bell and verbalizes understanding of how to use it. Pt is given hospital phone to use to call. Pt ambulates without assistance to restroom. Pt states hx of C.Diff but not recently.

## 2021-12-31 NOTE — ED PROVIDER NOTE - ATTENDING CONTRIBUTION TO CARE
Attending MD Stock.  Agree with above.  Pt is a 51 yr old fem with pmhx GERD, crohn's disease on remicade.  Pt with vomiting, non-bloody diarrhea that began ~0200 and  awoke with similar sxs.  Pt was more concerned because of hx of crohn's disease.  Pt endorses bloating, diffuse abdominal pain and inability to keep anything in her system with profuse watery diarrhea.  Abdomen is soft and vaguely tender over diffuse epigastrum/LUQ.  Pt's exam most c/w gastroenteritis.  Labs non-actionable.  Plan to reassess and obtain CT only if sxs do not improve with supportive care.

## 2022-01-01 DIAGNOSIS — K50.90 CROHN'S DISEASE, UNSPECIFIED, WITHOUT COMPLICATIONS: ICD-10-CM

## 2022-01-01 DIAGNOSIS — A41.9 SEPSIS, UNSPECIFIED ORGANISM: ICD-10-CM

## 2022-01-01 LAB
ANION GAP SERPL CALC-SCNC: 12 MMOL/L — SIGNIFICANT CHANGE UP (ref 5–17)
BUN SERPL-MCNC: 10 MG/DL — SIGNIFICANT CHANGE UP (ref 7–23)
CALCIUM SERPL-MCNC: 8 MG/DL — LOW (ref 8.4–10.5)
CHLORIDE SERPL-SCNC: 107 MMOL/L — SIGNIFICANT CHANGE UP (ref 96–108)
CO2 SERPL-SCNC: 22 MMOL/L — SIGNIFICANT CHANGE UP (ref 22–31)
CREAT SERPL-MCNC: 0.86 MG/DL — SIGNIFICANT CHANGE UP (ref 0.5–1.3)
GLUCOSE SERPL-MCNC: 96 MG/DL — SIGNIFICANT CHANGE UP (ref 70–99)
HCT VFR BLD CALC: 34.8 % — SIGNIFICANT CHANGE UP (ref 34.5–45)
HGB BLD-MCNC: 11.3 G/DL — LOW (ref 11.5–15.5)
MCHC RBC-ENTMCNC: 26.8 PG — LOW (ref 27–34)
MCHC RBC-ENTMCNC: 32.5 GM/DL — SIGNIFICANT CHANGE UP (ref 32–36)
MCV RBC AUTO: 82.7 FL — SIGNIFICANT CHANGE UP (ref 80–100)
NRBC # BLD: 0 /100 WBCS — SIGNIFICANT CHANGE UP (ref 0–0)
PLATELET # BLD AUTO: 223 K/UL — SIGNIFICANT CHANGE UP (ref 150–400)
POTASSIUM SERPL-MCNC: 3.1 MMOL/L — LOW (ref 3.5–5.3)
POTASSIUM SERPL-SCNC: 3.1 MMOL/L — LOW (ref 3.5–5.3)
RBC # BLD: 4.21 M/UL — SIGNIFICANT CHANGE UP (ref 3.8–5.2)
RBC # FLD: 13.8 % — SIGNIFICANT CHANGE UP (ref 10.3–14.5)
SODIUM SERPL-SCNC: 141 MMOL/L — SIGNIFICANT CHANGE UP (ref 135–145)
WBC # BLD: 8.08 K/UL — SIGNIFICANT CHANGE UP (ref 3.8–10.5)
WBC # FLD AUTO: 8.08 K/UL — SIGNIFICANT CHANGE UP (ref 3.8–10.5)

## 2022-01-01 PROCEDURE — 99223 1ST HOSP IP/OBS HIGH 75: CPT

## 2022-01-01 RX ORDER — POTASSIUM BICARBONATE 978 MG/1
25 TABLET, EFFERVESCENT ORAL
Refills: 0 | Status: COMPLETED | OUTPATIENT
Start: 2022-01-01 | End: 2022-01-01

## 2022-01-01 RX ORDER — INFLIXIMAB-DYYB 120 MG/ML
0 INJECTION SUBCUTANEOUS
Qty: 0 | Refills: 0 | DISCHARGE

## 2022-01-01 RX ORDER — DIPHENHYDRAMINE HCL 50 MG
25 CAPSULE ORAL ONCE
Refills: 0 | Status: COMPLETED | OUTPATIENT
Start: 2022-01-01 | End: 2022-01-01

## 2022-01-01 RX ORDER — OMEPRAZOLE 10 MG/1
0 CAPSULE, DELAYED RELEASE ORAL
Qty: 0 | Refills: 0 | DISCHARGE

## 2022-01-01 RX ORDER — METRONIDAZOLE 500 MG
500 TABLET ORAL EVERY 8 HOURS
Refills: 0 | Status: DISCONTINUED | OUTPATIENT
Start: 2022-01-01 | End: 2022-01-01

## 2022-01-01 RX ORDER — CIPROFLOXACIN LACTATE 400MG/40ML
400 VIAL (ML) INTRAVENOUS EVERY 12 HOURS
Refills: 0 | Status: DISCONTINUED | OUTPATIENT
Start: 2022-01-01 | End: 2022-01-03

## 2022-01-01 RX ORDER — PANTOPRAZOLE SODIUM 20 MG/1
40 TABLET, DELAYED RELEASE ORAL
Refills: 0 | Status: DISCONTINUED | OUTPATIENT
Start: 2022-01-01 | End: 2022-01-03

## 2022-01-01 RX ORDER — SODIUM CHLORIDE 9 MG/ML
1000 INJECTION, SOLUTION INTRAVENOUS
Refills: 0 | Status: DISCONTINUED | OUTPATIENT
Start: 2022-01-01 | End: 2022-01-02

## 2022-01-01 RX ORDER — METRONIDAZOLE 500 MG
500 TABLET ORAL EVERY 8 HOURS
Refills: 0 | Status: DISCONTINUED | OUTPATIENT
Start: 2022-01-01 | End: 2022-01-03

## 2022-01-01 RX ORDER — ACETAMINOPHEN 500 MG
650 TABLET ORAL ONCE
Refills: 0 | Status: COMPLETED | OUTPATIENT
Start: 2022-01-01 | End: 2022-01-01

## 2022-01-01 RX ORDER — SODIUM CHLORIDE 9 MG/ML
1000 INJECTION, SOLUTION INTRAVENOUS
Refills: 0 | Status: DISCONTINUED | OUTPATIENT
Start: 2022-01-01 | End: 2022-01-03

## 2022-01-01 RX ADMIN — Medication 500 MILLIGRAM(S): at 05:07

## 2022-01-01 RX ADMIN — Medication 200 MILLIGRAM(S): at 21:02

## 2022-01-01 RX ADMIN — POTASSIUM BICARBONATE 25 MILLIEQUIVALENT(S): 978 TABLET, EFFERVESCENT ORAL at 15:57

## 2022-01-01 RX ADMIN — Medication 650 MILLIGRAM(S): at 23:00

## 2022-01-01 RX ADMIN — Medication 500 MILLIGRAM(S): at 13:08

## 2022-01-01 RX ADMIN — Medication 650 MILLIGRAM(S): at 22:44

## 2022-01-01 RX ADMIN — Medication 200 MILLIGRAM(S): at 09:56

## 2022-01-01 RX ADMIN — PANTOPRAZOLE SODIUM 40 MILLIGRAM(S): 20 TABLET, DELAYED RELEASE ORAL at 05:07

## 2022-01-01 RX ADMIN — SODIUM CHLORIDE 45 MILLILITER(S): 9 INJECTION, SOLUTION INTRAVENOUS at 12:06

## 2022-01-01 RX ADMIN — Medication 500 MILLIGRAM(S): at 21:02

## 2022-01-01 RX ADMIN — POTASSIUM BICARBONATE 25 MILLIEQUIVALENT(S): 978 TABLET, EFFERVESCENT ORAL at 14:14

## 2022-01-01 NOTE — H&P ADULT - NSHPLABSRESULTS_GEN_ALL_CORE
Personally reviewed old records.  Personally reviewed labs.  Personally reviewed imaging.                           13.40 )-----------( 256      ( 31 Dec 2021 12:43 )             40.3           140  |  106  |  12  ----------------------------<  115<H>  3.9   |  21<L>  |  0.75    Ca    8.6      31 Dec 2021 12:43    TPro  7.0  /  Alb  4.2  /  TBili  0.4  /  DBili  x   /  AST  20  /  ALT  17  /  AlkPhos  97              LIVER FUNCTIONS - ( 31 Dec 2021 12:43 )  Alb: 4.2 g/dL / Pro: 7.0 g/dL / ALK PHOS: 97 U/L / ALT: 17 U/L / AST: 20 U/L / GGT: x                 Urinalysis Basic - ( 31 Dec 2021 13:05 )    Color: Light Yellow / Appearance: Clear / S.024 / pH: x  Gluc: x / Ketone: Small  / Bili: Negative / Urobili: Negative   Blood: x / Protein: Trace / Nitrite: Negative   Leuk Esterase: Negative / RBC: 3 /hpf / WBC 1 /HPF   Sq Epi: x / Non Sq Epi: 2 /hpf / Bacteria: Negative

## 2022-01-01 NOTE — CONSULT NOTE ADULT - ASSESSMENT
51 year old female with Crohn's disease with acute nausea vomiting and diarrhea    1. Acute nausea vomiting and diarrhea. Favor infectious gastroenteritis over Crohn's flare given acute onset of symptoms shared by her spouse.  -f/u stool studies for infection.  -ok with empiric abx    2. Crohn's enteritis. no signs of complication.   -next remicade dose in ~3 weeks per pt    3. Leukocytosis due to infection  -trending down    4. Mild anemia, likely dilutional      I had a prolonged conversation with the patient regarding the hospital course, differential diagnosis, results of diagnostic tests this far, and therapeutic modalities available. Plan of care discussed with the patient after the evaluation. Patient expresses a clear understanding of the plan of care.  125 minutes spent on the total encounter, of which more than fifty percent of the encounter was spent on counseling and/or coordinating care by the attending physician.  Advanced care planning forms were discussed. Code status including forceful chest compressions, defibrillation and intubation were discussed. The risks benefits and alternatives to pertinent gastrointestinal procedures and interventions were discussed in detail and all questions were answered. Duration: 15 Minutes.      Andi Sloan M.D.   Gastroenterology and Hepatology  [address], [place]  Office: [phone]  Cell: [phone]

## 2022-01-01 NOTE — PATIENT PROFILE ADULT - FALL HARM RISK - UNIVERSAL INTERVENTIONS
Bed in lowest position, wheels locked, appropriate side rails in place/Call bell, personal items and telephone in reach/Instruct patient to call for assistance before getting out of bed or chair/Non-slip footwear when patient is out of bed/[place] to call system/Physically safe environment - no spills, clutter or unnecessary equipment/Purposeful Proactive Rounding/Room/bathroom lighting operational, light cord in reach

## 2022-01-01 NOTE — H&P ADULT - HISTORY OF PRESENT ILLNESS
51F c hx GERD, crohns disease on remicade, pw 1 day N/V/D/abd pain.    Pt reports having abdominal bloating/swelling for 2 weeks, which she attributed to her period, and mild diarrhea for the past 2 days. Yesterday awoke at around 2AM with diffuse abd pain, N/V, diarrhea. Pt reports having nonbloody waterry diarrhea x4 over the past day. Denies fevers, chills. Pt also reports her  had similar symptoms several hours before her, so she thought it might be something she ate. Last colonoscopy was 1 yr ago. Last crohn's flare was about 3 yrs ago. Pt denies any relieving or exacerbating factors.

## 2022-01-01 NOTE — H&P ADULT - NSICDXPASTSURGICALHX_GEN_ALL_CORE_FT
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

## 2022-01-01 NOTE — CONSULT NOTE ADULT - SUBJECTIVE AND OBJECTIVE BOX
Crohn's flare vs infectious enteritis  f/u GI PCR, cdiff is negative  IV abx Chief Complaint:  Patient is a 51y old  Female who presents with a chief complaint of abd pain, vomiting, diarrhea (2022 06:43)      Date of service: 22 @ 19:47    HPI:    The patient is a 51 year old female with longstanding Crohn's ileitis on Remicade q8 weeks who presented with acute onset nausea vomiting and diarrhea. She began having multiple episodes of watery diarrhea soon after her  began experiencing the same symptoms.    The patient denies dysphagia, , unintentional weight loss, change in bowel habits or NSAID use.    She had been feeling well prior to this excepting occasional twinges of abdominal pain.    Her last colonoscopy ~2 y ago showed remission.    This morning she feels the diarrhea and nausea are improving.      Allergies:  adhesives (Other)  Apriso (Fever)  Asacol (Fever)  ceftin and medications she does not know what name is (Unknown)  contrast media (iodine-based) (Other)  heparin (Rash)  Medrol Dosepak (Other)      Home Medications:    Hospital Medications:  ciprofloxacin   IVPB 400 milliGRAM(s) IV Intermittent every 12 hours  dextrose 5% + lactated ringers. 1000 milliLiter(s) IV Continuous <Continuous>  lactated ringers. 1000 milliLiter(s) IV Continuous <Continuous>  metroNIDAZOLE    Tablet 500 milliGRAM(s) Oral every 8 hours  pantoprazole    Tablet 40 milliGRAM(s) Oral before breakfast      PMHX/PSHX:  Gastro - Esophageal Reflux Disease    Benign neoplasm of pituitary gland    Crohn disease    H/O hypercholesterolemia    Breast nodule    Plantar fasciitis    Temporal mandibular joint disorder    Fibromyalgia    Thrombus due to any device, implant or graft, sequela    Herniated disc, cervical    History of cholecystectomy    H/O colonoscopy    H/O endoscopy    History of pituitary adenoma    Thrombus        Family history:  Family history of coronary artery disease    Family history of diabetes mellitus (Father)    Family history of hypertension (Father, Mother)        Social History:   Denies ethanol use.  Denies illicit drug use.    ROS:     General:  No wt loss, fevers, chills, night sweats, fatigue,   Eyes:  Good vision, no reported pain  ENT:  No sore throat, pain, runny nose, dysphagia  CV:  No pain, palpitations, hypo/hypertension  Resp:  No dyspnea, cough, tachypnea, wheezing  GI:  See HPI  :  No pain, bleeding, incontinence, nocturia  Muscle:  No pain, weakness  Neuro:  No weakness, tingling, memory problems  Psych:  No fatigue, insomnia, mood problems, depression  Endocrine:  No polyuria, polydipsia, cold/heat intolerance  Heme:  No petechiae, ecchymosis, easy bruisability  Integumentary:  No rash, edema      PHYSICAL EXAM:     GENERAL:  Appears stated age, well-groomed, well-nourished, no distress  HEENT:  NC/AT,  conjunctivae anicteric, clear and pink,   NECK: supple, trachea midline  CHEST:  Full & symmetric excursion, no increased effort, breath sounds clear  HEART:  Regular rhythm, no JVD  ABDOMEN:  Soft, non-tender, non-distended, normoactive bowel sounds,  no masses , no hepatosplenomegaly  EXTREMITIES:  no cyanosis,clubbing or edema  SKIN:  No rash, erythema, or, ecchymoses, no jaundice  NEURO:  Alert, non-focal, no asterixis  PSYCH: Appropriate affect, oriented to place and time  RECTAL: Deferred      Vital Signs:  Vital Signs Last 24 Hrs  T(C): 36.9 (2022 19:08), Max: 36.9 (2022 19:08)  T(F): 98.5 (2022 19:08), Max: 98.5 (2022 19:08)  HR: 70 (2022 19:08) (69 - 87)  BP: 114/71 (2022 19:08) (112/60 - 130/77)  BP(mean): --  RR: 18 (2022 19:08) (18 - 18)  SpO2: 95% (2022 19:08) (95% - 98%)  Daily Height in cm: 162.56 (2022 10:47)    Daily     LABS: Labs personally reviewed by me:                        11.3   8.08  )-----------( 223      ( 2022 11:51 )             34.8         141  |  107  |  10  ----------------------------<  96  3.1<L>   |  22  |  0.86    Ca    8.0<L>      2022 11:51    TPro  7.0  /  Alb  4.2  /  TBili  0.4  /  DBili  x   /  AST  20  /  ALT  17  /  AlkPhos  97  -    LIVER FUNCTIONS - ( 31 Dec 2021 12:43 )  Alb: 4.2 g/dL / Pro: 7.0 g/dL / ALK PHOS: 97 U/L / ALT: 17 U/L / AST: 20 U/L / GGT: x             Urinalysis Basic - ( 31 Dec 2021 13:05 )    Color: Light Yellow / Appearance: Clear / S.024 / pH: x  Gluc: x / Ketone: Small  / Bili: Negative / Urobili: Negative   Blood: x / Protein: Trace / Nitrite: Negative   Leuk Esterase: Negative / RBC: 3 /hpf / WBC 1 /HPF   Sq Epi: x / Non Sq Epi: 2 /hpf / Bacteria: Negative          Imaging personally reviewed by me:

## 2022-01-01 NOTE — H&P ADULT - NSICDXFAMILYHX_GEN_ALL_CORE_FT
FAMILY HISTORY:  Family history of coronary artery disease    Father  Still living? Unknown  Family history of diabetes mellitus, Age at diagnosis: Age Unknown  Family history of hypertension, Age at diagnosis: Age Unknown    Mother  Still living? Unknown  Family history of hypertension, Age at diagnosis: Age Unknown

## 2022-01-01 NOTE — H&P ADULT - NSHPREVIEWOFSYSTEMS_GEN_ALL_CORE
REVIEW OF SYSTEMS:  CONSTITUTIONAL: No weakness. No fevers. No chills. No rigors. No weight loss. No night sweats. +poor appetite.  EYES: No blurry or double vision. No eye pain.  ENT: No hearing difficulty. No vertigo. No dysphagia. No sore throat. No Sinusitis/rhinorrhea.   NECK: No pain. No stiffness/rigidity.  CARDIAC: No chest pain. No palpitations. No lightheadedness. No syncope.  RESPIRATORY: No cough. No SOB. No hemoptysis.  GASTROINTESTINAL: +abdominal pain. +nausea. +vomiting. No hematemesis. +diarrhea. No constipation. No melena. No hematochezia.  GENITOURINARY: No dysuria. No frequency. No hesitancy. No hematuria.   NEUROLOGICAL: No numbness/tingling. No focal weakness. No urinary or fecal incontinence. No headache. No unsteady gait.  BACK: No back pain. No flank pain.  EXTREMITIES: +chronic lower extremity edema. Full ROM. No joint pain.  SKIN: No rashes. No itching. No other lesions.  PSYCHIATRIC: No depression. No anxiety. No SI/HI.  ALLERGIC: No lip swelling. No hives.  All other review of systems is negative unless indicated above.  Unless indicated above, unable to assess ROS 2/2

## 2022-01-01 NOTE — H&P ADULT - NSHPPHYSICALEXAM_GEN_ALL_CORE
PHYSICAL EXAM:   GENERAL: Alert. Not confused. No acute distress. Not thin. Not cachectic. Not obese.  HEAD:  Atraumatic. Normocephalic.  EYES: EOMI. PERRLA. Normal conjunctiva/sclera.  ENT: Neck supple. No JVD. Moist oral mucosa. Not edentulous. No thrush.  LYMPH: Normal supraclavicular/cervical lymph nodes.   CARDIAC: Not tachy, Not mateo. Regular rhythm. Not irregularly irregular. S1. S2. No murmur.   LUNG/CHEST: CTAB. BS equal bilaterally. No wheezes. No rales. No rhonchi.  ABDOMEN: Soft. +mild diffuse tenderness. No distension. No fluid wave. Normal bowel sounds.  BACK: No midline/vertebral tenderness. No flank tenderness.  VASCULAR: +2 b/l radial or ulnar pulses. Palpable DP pulses.  EXTREMITIES:  No clubbing. No cyanosis. +1 b/l nonpitting LE edema. Moving all 4.  NEUROLOGY: A&Ox3. Non-focal exam. Cranial nerves intact. Normal speech. Sensation intact.  PSYCH: Normal behavior. Normal affect.  SKIN: No jaundice. No erythema. No rash/lesion.  Vascular Access:     ICU Vital Signs Last 24 Hrs  T(C): 36.7 (31 Dec 2021 21:19), Max: 37.1 (31 Dec 2021 18:24)  T(F): 98.1 (31 Dec 2021 21:19), Max: 98.7 (31 Dec 2021 18:24)  HR: 87 (31 Dec 2021 21:19) (87 - 115)  BP: 130/77 (31 Dec 2021 21:19) (115/76 - 130/77)  BP(mean): 79 (31 Dec 2021 14:52) (79 - 79)  ABP: --  ABP(mean): --  RR: 18 (31 Dec 2021 21:19) (18 - 20)  SpO2: 95% (31 Dec 2021 21:19) (95% - 97%)      I&O's Summary

## 2022-01-01 NOTE — H&P ADULT - NSHPSOCIALHISTORY_GEN_ALL_CORE
Social History:    Marital Status: ( x ) , (  ) Single, (  ) , (  ) , (  )   # of Children: 0  Lives with: (  ) alone, (  ) children, ( x ) spouse, (  ) parents, (  ) siblings, (  ) friends, (  ) other:   Occupation:     Substance Use/Illicit Drugs: (  ) never used vs other:   Tobacco Usage: (  ) never smoked, ( x ) former smoker, (  ) current smoker and Total Pack-Years: 10 pk yrs  Last Alcohol Usage/Frequency/Amount/Withdrawal/Hx of Abuse:  none  Foreign travel:   Animal exposure:

## 2022-01-01 NOTE — H&P ADULT - NSICDXPASTMEDICALHX_GEN_ALL_CORE_FT
PAST MEDICAL HISTORY:  Benign neoplasm of pituitary gland     Breast nodule right breast   been monitored   yearly sonogram    Crohn disease dx 2012    Fibromyalgia     Gastro - Esophageal Reflux Disease     H/O hypercholesterolemia no medications  controlled with diet and exercise    Herniated disc, cervical     Plantar fasciitis bilateral  on therapy    Temporal mandibular joint disorder     Thrombus due to any device, implant or graft, sequela RUE arterial thrombus, complication of pituitary adenoma resection 2014, s/p thrombectomy at the time

## 2022-01-02 LAB
ALBUMIN SERPL ELPH-MCNC: 4.1 G/DL — SIGNIFICANT CHANGE UP (ref 3.3–5)
ALP SERPL-CCNC: 84 U/L — SIGNIFICANT CHANGE UP (ref 40–120)
ALT FLD-CCNC: 22 U/L — SIGNIFICANT CHANGE UP (ref 10–45)
ANION GAP SERPL CALC-SCNC: 12 MMOL/L — SIGNIFICANT CHANGE UP (ref 5–17)
AST SERPL-CCNC: 25 U/L — SIGNIFICANT CHANGE UP (ref 10–40)
BILIRUB SERPL-MCNC: 0.3 MG/DL — SIGNIFICANT CHANGE UP (ref 0.2–1.2)
BUN SERPL-MCNC: 7 MG/DL — SIGNIFICANT CHANGE UP (ref 7–23)
CALCIUM SERPL-MCNC: 8.3 MG/DL — LOW (ref 8.4–10.5)
CHLORIDE SERPL-SCNC: 105 MMOL/L — SIGNIFICANT CHANGE UP (ref 96–108)
CO2 SERPL-SCNC: 22 MMOL/L — SIGNIFICANT CHANGE UP (ref 22–31)
CREAT SERPL-MCNC: 0.83 MG/DL — SIGNIFICANT CHANGE UP (ref 0.5–1.3)
CULTURE RESULTS: SIGNIFICANT CHANGE UP
GLUCOSE SERPL-MCNC: 106 MG/DL — HIGH (ref 70–99)
HCT VFR BLD CALC: 37.1 % — SIGNIFICANT CHANGE UP (ref 34.5–45)
HGB BLD-MCNC: 12.1 G/DL — SIGNIFICANT CHANGE UP (ref 11.5–15.5)
MCHC RBC-ENTMCNC: 27 PG — SIGNIFICANT CHANGE UP (ref 27–34)
MCHC RBC-ENTMCNC: 32.6 GM/DL — SIGNIFICANT CHANGE UP (ref 32–36)
MCV RBC AUTO: 82.8 FL — SIGNIFICANT CHANGE UP (ref 80–100)
NRBC # BLD: 0 /100 WBCS — SIGNIFICANT CHANGE UP (ref 0–0)
PLATELET # BLD AUTO: 238 K/UL — SIGNIFICANT CHANGE UP (ref 150–400)
POTASSIUM SERPL-MCNC: 3.4 MMOL/L — LOW (ref 3.5–5.3)
POTASSIUM SERPL-SCNC: 3.4 MMOL/L — LOW (ref 3.5–5.3)
PROT SERPL-MCNC: 6.6 G/DL — SIGNIFICANT CHANGE UP (ref 6–8.3)
RBC # BLD: 4.48 M/UL — SIGNIFICANT CHANGE UP (ref 3.8–5.2)
RBC # FLD: 13.8 % — SIGNIFICANT CHANGE UP (ref 10.3–14.5)
SARS-COV-2 RNA SPEC QL NAA+PROBE: SIGNIFICANT CHANGE UP
SODIUM SERPL-SCNC: 139 MMOL/L — SIGNIFICANT CHANGE UP (ref 135–145)
SPECIMEN SOURCE: SIGNIFICANT CHANGE UP
WBC # BLD: 7.45 K/UL — SIGNIFICANT CHANGE UP (ref 3.8–10.5)
WBC # FLD AUTO: 7.45 K/UL — SIGNIFICANT CHANGE UP (ref 3.8–10.5)

## 2022-01-02 RX ORDER — DEXTROSE MONOHYDRATE, SODIUM CHLORIDE, AND POTASSIUM CHLORIDE 50; .745; 4.5 G/1000ML; G/1000ML; G/1000ML
1000 INJECTION, SOLUTION INTRAVENOUS
Refills: 0 | Status: DISCONTINUED | OUTPATIENT
Start: 2022-01-02 | End: 2022-01-03

## 2022-01-02 RX ORDER — ONDANSETRON 8 MG/1
4 TABLET, FILM COATED ORAL ONCE
Refills: 0 | Status: COMPLETED | OUTPATIENT
Start: 2022-01-02 | End: 2022-01-02

## 2022-01-02 RX ORDER — POTASSIUM BICARBONATE 978 MG/1
25 TABLET, EFFERVESCENT ORAL
Refills: 0 | Status: COMPLETED | OUTPATIENT
Start: 2022-01-02 | End: 2022-01-02

## 2022-01-02 RX ORDER — POTASSIUM CHLORIDE 20 MEQ
40 PACKET (EA) ORAL ONCE
Refills: 0 | Status: COMPLETED | OUTPATIENT
Start: 2022-01-02 | End: 2022-01-02

## 2022-01-02 RX ORDER — ENOXAPARIN SODIUM 100 MG/ML
40 INJECTION SUBCUTANEOUS DAILY
Refills: 0 | Status: DISCONTINUED | OUTPATIENT
Start: 2022-01-02 | End: 2022-01-03

## 2022-01-02 RX ADMIN — Medication 200 MILLIGRAM(S): at 05:59

## 2022-01-02 RX ADMIN — ENOXAPARIN SODIUM 40 MILLIGRAM(S): 100 INJECTION SUBCUTANEOUS at 14:28

## 2022-01-02 RX ADMIN — DEXTROSE MONOHYDRATE, SODIUM CHLORIDE, AND POTASSIUM CHLORIDE 75 MILLILITER(S): 50; .745; 4.5 INJECTION, SOLUTION INTRAVENOUS at 11:57

## 2022-01-02 RX ADMIN — Medication 500 MILLIGRAM(S): at 21:21

## 2022-01-02 RX ADMIN — PANTOPRAZOLE SODIUM 40 MILLIGRAM(S): 20 TABLET, DELAYED RELEASE ORAL at 05:59

## 2022-01-02 RX ADMIN — Medication 500 MILLIGRAM(S): at 16:01

## 2022-01-02 RX ADMIN — ONDANSETRON 4 MILLIGRAM(S): 8 TABLET, FILM COATED ORAL at 02:44

## 2022-01-02 RX ADMIN — Medication 500 MILLIGRAM(S): at 05:59

## 2022-01-02 RX ADMIN — Medication 200 MILLIGRAM(S): at 17:15

## 2022-01-02 RX ADMIN — Medication 40 MILLIEQUIVALENT(S): at 12:06

## 2022-01-02 NOTE — PROGRESS NOTE ADULT - ASSESSMENT
51 year old female with Crohn's disease with acute nausea vomiting and diarrhea    1. Acute nausea vomiting and diarrhea. Favor infectious gastroenteritis over Crohn's flare given acute onset of symptoms shared by her spouse.  -f/u stool studies for infection.  -ok with empiric abx  -clinically improving, diet as tolerated    2. Crohn's enteritis. no signs of complication.   -next remicade dose in ~3 weeks per pt    3. Leukocytosis due to infection  -trending down    4. Mild anemia, likely dilutional    5. Hypokalemia  -s/p repletion      I had a prolonged conversation with the patient regarding the hospital course, differential diagnosis, results of diagnostic tests this far, and therapeutic modalities available. Plan of care discussed with the patient after the evaluation. Patient expresses a clear understanding of the plan of care.  65 minutes spent on the total encounter, of which more than fifty percent of the encounter was spent on counseling and/or coordinating care by the attending physician.      Andi Sloan M.D.   Gastroenterology and Hepatology  [address] [place], [place]  Office: [phone]

## 2022-01-03 ENCOUNTER — TRANSCRIPTION ENCOUNTER (OUTPATIENT)
Age: 52
End: 2022-01-03

## 2022-01-03 VITALS
SYSTOLIC BLOOD PRESSURE: 133 MMHG | DIASTOLIC BLOOD PRESSURE: 84 MMHG | TEMPERATURE: 98 F | OXYGEN SATURATION: 97 % | RESPIRATION RATE: 18 BRPM | HEART RATE: 75 BPM

## 2022-01-03 LAB
ALBUMIN SERPL ELPH-MCNC: 4.1 G/DL — SIGNIFICANT CHANGE UP (ref 3.3–5)
ALP SERPL-CCNC: 76 U/L — SIGNIFICANT CHANGE UP (ref 40–120)
ALT FLD-CCNC: 24 U/L — SIGNIFICANT CHANGE UP (ref 10–45)
ANION GAP SERPL CALC-SCNC: 12 MMOL/L — SIGNIFICANT CHANGE UP (ref 5–17)
AST SERPL-CCNC: 26 U/L — SIGNIFICANT CHANGE UP (ref 10–40)
BILIRUB SERPL-MCNC: 0.3 MG/DL — SIGNIFICANT CHANGE UP (ref 0.2–1.2)
BUN SERPL-MCNC: 7 MG/DL — SIGNIFICANT CHANGE UP (ref 7–23)
CALCIUM SERPL-MCNC: 8.6 MG/DL — SIGNIFICANT CHANGE UP (ref 8.4–10.5)
CHLORIDE SERPL-SCNC: 106 MMOL/L — SIGNIFICANT CHANGE UP (ref 96–108)
CO2 SERPL-SCNC: 22 MMOL/L — SIGNIFICANT CHANGE UP (ref 22–31)
CREAT SERPL-MCNC: 0.84 MG/DL — SIGNIFICANT CHANGE UP (ref 0.5–1.3)
CULTURE RESULTS: SIGNIFICANT CHANGE UP
GLUCOSE SERPL-MCNC: 127 MG/DL — HIGH (ref 70–99)
HCT VFR BLD CALC: 37.1 % — SIGNIFICANT CHANGE UP (ref 34.5–45)
HGB BLD-MCNC: 11.9 G/DL — SIGNIFICANT CHANGE UP (ref 11.5–15.5)
MCHC RBC-ENTMCNC: 26.7 PG — LOW (ref 27–34)
MCHC RBC-ENTMCNC: 32.1 GM/DL — SIGNIFICANT CHANGE UP (ref 32–36)
MCV RBC AUTO: 83.2 FL — SIGNIFICANT CHANGE UP (ref 80–100)
NRBC # BLD: 0 /100 WBCS — SIGNIFICANT CHANGE UP (ref 0–0)
PLATELET # BLD AUTO: 225 K/UL — SIGNIFICANT CHANGE UP (ref 150–400)
POTASSIUM SERPL-MCNC: 3.7 MMOL/L — SIGNIFICANT CHANGE UP (ref 3.5–5.3)
POTASSIUM SERPL-SCNC: 3.7 MMOL/L — SIGNIFICANT CHANGE UP (ref 3.5–5.3)
PROT SERPL-MCNC: 6.6 G/DL — SIGNIFICANT CHANGE UP (ref 6–8.3)
RBC # BLD: 4.46 M/UL — SIGNIFICANT CHANGE UP (ref 3.8–5.2)
RBC # FLD: 13.7 % — SIGNIFICANT CHANGE UP (ref 10.3–14.5)
SODIUM SERPL-SCNC: 140 MMOL/L — SIGNIFICANT CHANGE UP (ref 135–145)
SPECIMEN SOURCE: SIGNIFICANT CHANGE UP
WBC # BLD: 7.68 K/UL — SIGNIFICANT CHANGE UP (ref 3.8–10.5)
WBC # FLD AUTO: 7.68 K/UL — SIGNIFICANT CHANGE UP (ref 3.8–10.5)

## 2022-01-03 PROCEDURE — 87449 NOS EACH ORGANISM AG IA: CPT

## 2022-01-03 PROCEDURE — 82947 ASSAY GLUCOSE BLOOD QUANT: CPT

## 2022-01-03 PROCEDURE — 99285 EMERGENCY DEPT VISIT HI MDM: CPT

## 2022-01-03 PROCEDURE — 87086 URINE CULTURE/COLONY COUNT: CPT

## 2022-01-03 PROCEDURE — 36415 COLL VENOUS BLD VENIPUNCTURE: CPT

## 2022-01-03 PROCEDURE — 83605 ASSAY OF LACTIC ACID: CPT

## 2022-01-03 PROCEDURE — 84295 ASSAY OF SERUM SODIUM: CPT

## 2022-01-03 PROCEDURE — 96375 TX/PRO/DX INJ NEW DRUG ADDON: CPT

## 2022-01-03 PROCEDURE — 87046 STOOL CULTR AEROBIC BACT EA: CPT

## 2022-01-03 PROCEDURE — 84132 ASSAY OF SERUM POTASSIUM: CPT

## 2022-01-03 PROCEDURE — 85027 COMPLETE CBC AUTOMATED: CPT

## 2022-01-03 PROCEDURE — U0003: CPT

## 2022-01-03 PROCEDURE — 87177 OVA AND PARASITES SMEARS: CPT

## 2022-01-03 PROCEDURE — 82330 ASSAY OF CALCIUM: CPT

## 2022-01-03 PROCEDURE — 86140 C-REACTIVE PROTEIN: CPT

## 2022-01-03 PROCEDURE — 87324 CLOSTRIDIUM AG IA: CPT

## 2022-01-03 PROCEDURE — 80053 COMPREHEN METABOLIC PANEL: CPT

## 2022-01-03 PROCEDURE — 82803 BLOOD GASES ANY COMBINATION: CPT

## 2022-01-03 PROCEDURE — 84702 CHORIONIC GONADOTROPIN TEST: CPT

## 2022-01-03 PROCEDURE — 83690 ASSAY OF LIPASE: CPT

## 2022-01-03 PROCEDURE — 85025 COMPLETE CBC W/AUTO DIFF WBC: CPT

## 2022-01-03 PROCEDURE — 80048 BASIC METABOLIC PNL TOTAL CA: CPT

## 2022-01-03 PROCEDURE — 85018 HEMOGLOBIN: CPT

## 2022-01-03 PROCEDURE — 87507 IADNA-DNA/RNA PROBE TQ 12-25: CPT

## 2022-01-03 PROCEDURE — 85014 HEMATOCRIT: CPT

## 2022-01-03 PROCEDURE — 96376 TX/PRO/DX INJ SAME DRUG ADON: CPT

## 2022-01-03 PROCEDURE — 85652 RBC SED RATE AUTOMATED: CPT

## 2022-01-03 PROCEDURE — 81001 URINALYSIS AUTO W/SCOPE: CPT

## 2022-01-03 PROCEDURE — 74177 CT ABD & PELVIS W/CONTRAST: CPT | Mod: MG

## 2022-01-03 PROCEDURE — U0005: CPT

## 2022-01-03 PROCEDURE — 96374 THER/PROPH/DIAG INJ IV PUSH: CPT

## 2022-01-03 PROCEDURE — G1004: CPT

## 2022-01-03 PROCEDURE — 82435 ASSAY OF BLOOD CHLORIDE: CPT

## 2022-01-03 PROCEDURE — 87045 FECES CULTURE AEROBIC BACT: CPT

## 2022-01-03 RX ADMIN — Medication 200 MILLIGRAM(S): at 05:08

## 2022-01-03 RX ADMIN — ENOXAPARIN SODIUM 40 MILLIGRAM(S): 100 INJECTION SUBCUTANEOUS at 15:39

## 2022-01-03 RX ADMIN — Medication 500 MILLIGRAM(S): at 15:39

## 2022-01-03 RX ADMIN — PANTOPRAZOLE SODIUM 40 MILLIGRAM(S): 20 TABLET, DELAYED RELEASE ORAL at 05:49

## 2022-01-03 RX ADMIN — Medication 500 MILLIGRAM(S): at 05:09

## 2022-01-03 NOTE — DISCHARGE NOTE PROVIDER - NSDCPNSUBOBJ_GEN_ALL_CORE
Axox3  NSR S1Sr rrr  lungs CTA   ab soft nontender   voids  equal strength throughout   B/lle warm well perfused   RUE AC site improved benign    T(C): 37 (01-03-22 @ 11:59), Max: 37 (01-03-22 @ 11:59)  T(F): 98.6 (01-03-22 @ 11:59), Max: 98.6 (01-03-22 @ 11:59)  HR: 73 (01-03-22 @ 11:59) (68 - 73)  BP: 124/83 (01-03-22 @ 11:59) (117/77 - 124/83)  RR: 18 (01-03-22 @ 11:59) (18 - 18)  SpO2: 98% (01-03-22 @ 11:59) (97% - 98%)                        11.9   7.68  )-----------( 225      ( 03 Jan 2022 10:57 )             37.1     01-03    140  |  106  |  7   ----------------------------<  127<H>  3.7   |  22  |  0.84    Ca    8.6      03 Jan 2022 10:56    TPro  6.6  /  Alb  4.1  /  TBili  0.3  /  DBili  x   /  AST  26  /  ALT  24  /  AlkPhos  76  01-03  GI PCR Panel, Stool (01.02.22 @ 17:22)   Specimen Source: .Stool Feces   Culture Results:   Norovirus GI/GII   DETECTED by PCR   *******Please Note:*******   GI panel PCR evaluates for: campylobacter, Plesiomonas shigelloides, Salmonella, vibrio, Yersinia enterocolitica, Enteroaggregative Escherichia coli (EAEC), Enteropathogenic E.coli (EPEC), Enterotoxigenic E. coli (ETEC) lt/st, Shiga-like toxin-producing E. coli (STEC) stx1/stx2, shigella/ Enteroinvasive E. coli (EIEC), Cryptosporidium,Cyclospora cayetanensis, Entamoeba histolytica,Giardia lamblia, Adenovirus F 40/41, Astrovirus, norovirus GI/GII, Rotavirus A, Sapovirus   Greater then 45 minute spent on discharge

## 2022-01-03 NOTE — PROGRESS NOTE ADULT - PROBLEM SELECTOR PLAN 4
continue PPI    dc planning   [facility]  
continue PPI    dc planning am pending improved symptoms  [facility]

## 2022-01-03 NOTE — PROGRESS NOTE ADULT - PROBLEM SELECTOR PROBLEM 3
Crohn's disease of small intestine without complication
Crohn's disease of small intestine without complication

## 2022-01-03 NOTE — PROGRESS NOTE ADULT - ASSESSMENT
51 year old female with Crohn's disease with acute nausea vomiting and diarrhea    1. Acute nausea vomiting and diarrhea. Favor infectious gastroenteritis over Crohn's flare given acute onset of symptoms shared by her spouse.  -Norovirus detected on GI PCR  -no abx needed. Self resolving virus.  -encourage PO hydration  -diet as tolerated   -okay for discharge if repeat morning labs are stable     2. Crohn's enteritis. no signs of complication.   -next remicade dose in ~3 weeks per pt    3. Leukocytosis due to infection  -trending down    4. Mild anemia, likely dilutional    5. Hypokalemia  -s/p repletion      I had a prolonged conversation with the patient regarding the hospital course, differential diagnosis, results of diagnostic tests this far, and therapeutic modalities available. Plan of care discussed with the patient after the evaluation. Patient expresses a clear understanding of the plan of care.  65 minutes spent on the total encounter, of which more than fifty percent of the encounter was spent on counseling and/or coordinating care by the attending physician.    Attending supervision statement: I have personally seen and examined the patient. I fully participated in the care of this patient. I have made amendments to the documentation where necessary, and agree with the history, physical exam, and plan as outlined by the ACP.    Andi Sloan M.D.   Gastroenterology and Hepatology  [address] [place], [place]  Office: [phone]   51 year old female with Crohn's disease with acute nausea vomiting and diarrhea    1. Acute nausea vomiting and diarrhea. Favor infectious gastroenteritis over Crohn's flare given acute onset of symptoms shared by her spouse.  -Norovirus detected on GI PCR  -O&P negative  -no abx needed. Self resolving virus.  -encourage PO hydration  -diet as tolerated   -wash hands  -okay for discharge if repeat morning labs are stable     2. Crohn's enteritis. no signs of complication.   -next remicade dose in ~3 weeks per pt    3. Leukocytosis due to infection  -trending down    4. Mild anemia, likely dilutional    5. Hypokalemia  -s/p repletion      I had a prolonged conversation with the patient regarding the hospital course, differential diagnosis, results of diagnostic tests this far, and therapeutic modalities available. Plan of care discussed with the patient after the evaluation. Patient expresses a clear understanding of the plan of care.  65 minutes spent on the total encounter, of which more than fifty percent of the encounter was spent on counseling and/or coordinating care by the attending physician.    Attending supervision statement: I have personally seen and examined the patient. I fully participated in the care of this patient. I have made amendments to the documentation where necessary, and agree with the history, physical exam, and plan as outlined by the ACP.    Andi Sloan M.D.   Gastroenterology and Hepatology  [address] [place], [place]  Office: [phone]

## 2022-01-03 NOTE — DISCHARGE NOTE NURSING/CASE MANAGEMENT/SOCIAL WORK - NSDCPEFALRISK_GEN_ALL_CORE
For information on Fall & Injury Prevention, visit: [email or web address] OR  [email or web address] OR  https://www.cdc.gov/steadi/patient.html

## 2022-01-03 NOTE — PROGRESS NOTE ADULT - PROBLEM SELECTOR PLAN 3
-obtain CRP, ESR, B12, folate    hypokalemia  -supplement with k
-obtain CRP, ESR, B12, folate    hypokalemia  -supplement with k

## 2022-01-03 NOTE — DISCHARGE NOTE PROVIDER - HOSPITAL COURSE
This is a 51 year old female with Crohn's disease with acute nausea vomiting and diarrhea admitted to [facility]  12/31   seen by GI Dr Sloan  placed on flagyl an metronidazole  Hypokalemic placed on normal saline with potassium @ 75 cc/hr   Found to have Positive stool for Norovirus   diarrhea resolved abx d/c hypokalemia resolved.  1/3 Seen and examined found to have left AC iIV site with  erythema IV removed no cord - arm elevated with warm packs - resolved    VSS stable and eager for discharge-

## 2022-01-03 NOTE — PROGRESS NOTE ADULT - PROBLEM SELECTOR PLAN 2
-as above, most likely 2/2 infecitous etiology, less likely 2/2 Crohns flair
-as above, most likely 2/2 infecitous etiology, less likely 2/2 Crohns flair

## 2022-01-03 NOTE — DISCHARGE NOTE PROVIDER - NSDCMRMEDTOKEN_GEN_ALL_CORE_FT
PriLOSEC OTC 20 mg oral delayed release tablet: 1 tab(s) orally once a day  Remicade: intravenous every 8 weeks

## 2022-01-03 NOTE — PROGRESS NOTE ADULT - PROBLEM SELECTOR PLAN 1
With fever, tachycardia, leukocytosis, most likely 2/2 GI infection in setting of Crohn disease.   -s/p 2L NS, hemodynamically stable  -cipro/flagyl per id 7 days total  -f/u stool cultures  -f/u blood cultures ngtd  -RVP negative  GI f/u
With fever, tachycardia, leukocytosis, most likely 2/2 GI infection in setting of Crohn disease.   -s/p 2L NS, hemodynamically stable  -cipro/flagyl per id 7 days total  -f/u stool cultures  -f/u blood cultures ngtd  -RVP negative  GI f/u

## 2022-01-03 NOTE — PROGRESS NOTE ADULT - SUBJECTIVE AND OBJECTIVE BOX
Chief Complaint:  Patient is a 51y old  Female who presents with a chief complaint of abd pain, vomiting, diarrhea (02 Jan 2022 15:13)      Date of service 01-02-22 @ 17:07      Interval Events:   frequency of diarrhea improving    Hospital Medications:  ciprofloxacin   IVPB 400 milliGRAM(s) IV Intermittent every 12 hours  enoxaparin Injectable 40 milliGRAM(s) SubCutaneous daily  lactated ringers. 1000 milliLiter(s) IV Continuous <Continuous>  metroNIDAZOLE    Tablet 500 milliGRAM(s) Oral every 8 hours  pantoprazole    Tablet 40 milliGRAM(s) Oral before breakfast  sodium chloride 0.9% with potassium chloride 20 mEq/L 1000 milliLiter(s) IV Continuous <Continuous>        Review of Systems:  General:  No wt loss, fevers, chills, night sweats, fatigue,   Eyes:  Good vision, no reported pain  ENT:  No sore throat, pain, runny nose, dysphagia  CV:  No pain, palpitations, hypo/hypertension  Resp:  No dyspnea, cough, tachypnea, wheezing  GI:  See HPI  :  No pain, bleeding, incontinence, nocturia  Muscle:  No pain, weakness  Neuro:  No weakness, tingling, memory problems  Psych:  No fatigue, insomnia, mood problems, depression  Endocrine:  No polyuria, polydipsia, cold/heat intolerance  Heme:  No petechiae, ecchymosis, easy bruisability  Integumentary:  No rash, edema    PHYSICAL EXAM:   Vital Signs:  Vital Signs Last 24 Hrs  T(C): 36.8 (02 Jan 2022 04:34), Max: 36.8 (02 Jan 2022 04:34)  T(F): 98.2 (02 Jan 2022 04:34), Max: 98.2 (02 Jan 2022 04:34)  HR: 70 (02 Jan 2022 04:34) (70 - 70)  BP: 109/77 (02 Jan 2022 04:34) (109/77 - 114/71)  BP(mean): --  RR: 18 (02 Jan 2022 04:34) (18 - 18)  SpO2: 98% (02 Jan 2022 04:34) (95% - 98%)  Daily     Daily       PHYSICAL EXAM:     GENERAL:  Appears stated age, well-groomed, well-nourished, no distress  HEENT:  NC/AT,  conjunctivae anicteric, clear and pink,   NECK: supple, trachea midline  CHEST:  Full & symmetric excursion, no increased effort, breath sounds clear  HEART:  Regular rhythm, no JVD  ABDOMEN:  Soft, non-tender, non-distended, normoactive bowel sounds,  no masses , no hepatosplenomegaly  EXTREMITIES:  no cyanosis,clubbing or edema  SKIN:  No rash, erythema, or, ecchymoses, no jaundice  NEURO:  Alert, non-focal, no asterixis  PSYCH: Appropriate affect, oriented to place and time  RECTAL: Deferred      LABS Personally reviewed by me:                        12.1   7.45  )-----------( 238      ( 02 Jan 2022 10:15 )             37.1     Mean Cell Volume: 82.8 fl (01-02-22 @ 10:15)    01-02    139  |  105  |  7   ----------------------------<  106<H>  3.4<L>   |  22  |  0.83    Ca    8.3<L>      02 Jan 2022 10:15    TPro  6.6  /  Alb  4.1  /  TBili  0.3  /  DBili  x   /  AST  25  /  ALT  22  /  AlkPhos  84  01-02    LIVER FUNCTIONS - ( 02 Jan 2022 10:15 )  Alb: 4.1 g/dL / Pro: 6.6 g/dL / ALK PHOS: 84 U/L / ALT: 22 U/L / AST: 25 U/L / GGT: x                                       12.1   7.45  )-----------( 238      ( 02 Jan 2022 10:15 )             37.1                         11.3   8.08  )-----------( 223      ( 01 Jan 2022 11:51 )             34.8                         12.9   13.40 )-----------( 256      ( 31 Dec 2021 12:43 )             40.3       Imaging personally reviewed by me:          
  Chief Complaint:  Patient is a 51y old  Female who presents with a chief complaint of abd pain, vomiting, diarrhea (2022 17:06)      Date of service 22 @ 11:31      Interval Events:   Patient seen and examined. States she is feeling much better. Symptoms improving.     Hospital Medications:  ciprofloxacin   IVPB 400 milliGRAM(s) IV Intermittent every 12 hours  enoxaparin Injectable 40 milliGRAM(s) SubCutaneous daily  lactated ringers. 1000 milliLiter(s) IV Continuous <Continuous>  metroNIDAZOLE    Tablet 500 milliGRAM(s) Oral every 8 hours  pantoprazole    Tablet 40 milliGRAM(s) Oral before breakfast  sodium chloride 0.9% with potassium chloride 20 mEq/L 1000 milliLiter(s) IV Continuous <Continuous>        Review of Systems:  General:  No wt loss, fevers, chills, night sweats, fatigue,   Eyes:  Good vision, no reported pain  ENT:  No sore throat, pain, runny nose, dysphagia  CV:  No pain, palpitations, hypo/hypertension  Resp:  No dyspnea, cough, tachypnea, wheezing  GI:  See HPI  :  No pain, bleeding, incontinence, nocturia  Muscle:  No pain, weakness  Neuro:  No weakness, tingling, memory problems  Psych:  No fatigue, insomnia, mood problems, depression  Endocrine:  No polyuria, polydipsia, cold/heat intolerance  Heme:  No petechiae, ecchymosis, easy bruisability  Integumentary:  No rash, edema    PHYSICAL EXAM:   Vital Signs:  Vital Signs Last 24 Hrs  T(C): 36.6 (2022 05:00), Max: 36.7 (2022 19:54)  T(F): 97.9 (2022 05:00), Max: 98.1 (2022 19:54)  HR: 73 (2022 05:00) (68 - 73)  BP: 117/78 (2022 05:00) (117/77 - 117/78)  BP(mean): 91 (2022 05:00) (91 - 91)  RR: 18 (2022 05:00) (18 - 18)  SpO2: 97% (2022 05:00) (97% - 98%)  Daily     Daily Weight in k.2 (2022 07:29)      PHYSICAL EXAM:     GENERAL:  Appears stated age, well-groomed, well-nourished, no distress  HEENT:  NC/AT,  conjunctivae anicteric, clear and pink,   NECK: supple, trachea midline  CHEST:  Full & symmetric excursion, no increased effort, breath sounds clear  HEART:  Regular rhythm, no JVD  ABDOMEN:  Soft, non-tender, non-distended, normoactive bowel sounds,  no masses , no hepatosplenomegaly  EXTREMITIES:  no cyanosis,clubbing or edema  SKIN:  No rash, erythema, or, ecchymoses, no jaundice  NEURO:  Alert, non-focal, no asterixis  PSYCH: Appropriate affect, oriented to place and time  RECTAL: Deferred      LABS Personally reviewed by me:                        11.9   7.68  )-----------( 225      ( 2022 10:57 )             37.1     Mean Cell Volume: 83.2 fl (22 @ 10:57)        139  |  105  |  7   ----------------------------<  106<H>  3.4<L>   |  22  |  0.83    Ca    8.3<L>      2022 10:15    TPro  6.6  /  Alb  4.1  /  TBili  0.3  /  DBili  x   /  AST  25  /  ALT  22  /  AlkPhos  84  01-02    LIVER FUNCTIONS - ( 2022 10:15 )  Alb: 4.1 g/dL / Pro: 6.6 g/dL / ALK PHOS: 84 U/L / ALT: 22 U/L / AST: 25 U/L / GGT: x                                       11.9   7.68  )-----------( 225      ( 2022 10:57 )             37.1                         12.1   7.45  )-----------( 238      ( 2022 10:15 )             37.1                         11.3   8.08  )-----------( 223      ( 2022 11:51 )             34.8                         12.9   13.40 )-----------( 256      ( 31 Dec 2021 12:43 )             40.3       Imaging personally reviewed by me:          
Patient is a 51y old  Female who presents with a chief complaint of abd pain, vomiting, diarrhea (01 Jan 2022 06:43)      INTERVAL HPI/OVERNIGHT EVENTS: noted  pt seen and examined this am   events noted  c/o nausea last night  episodes of diarrhea this am      Vital Signs Last 24 Hrs  T(C): 36.8 (02 Jan 2022 04:34), Max: 36.8 (02 Jan 2022 04:34)  T(F): 98.2 (02 Jan 2022 04:34), Max: 98.2 (02 Jan 2022 04:34)  HR: 70 (02 Jan 2022 04:34) (70 - 70)  BP: 109/77 (02 Jan 2022 04:34) (109/77 - 114/71)  BP(mean): --  RR: 18 (02 Jan 2022 04:34) (18 - 18)  SpO2: 98% (02 Jan 2022 04:34) (95% - 98%)    ciprofloxacin   IVPB 400 milliGRAM(s) IV Intermittent every 12 hours  enoxaparin Injectable 40 milliGRAM(s) SubCutaneous daily  lactated ringers. 1000 milliLiter(s) IV Continuous <Continuous>  metroNIDAZOLE    Tablet 500 milliGRAM(s) Oral every 8 hours  pantoprazole    Tablet 40 milliGRAM(s) Oral before breakfast  sodium chloride 0.9% with potassium chloride 20 mEq/L 1000 milliLiter(s) IV Continuous <Continuous>      PHYSICAL EXAM:  GENERAL: NAD,   EYES: conjunctiva and sclera clear  ENMT: Moist mucous membranes  NECK: Supple, No JVD, Normal thyroid  CHEST/LUNG: non labored, cta b/l  HEART: Regular rate and rhythm; No murmurs, rubs, or gallops  ABDOMEN: Soft, Nontender, Nondistended; Bowel sounds present  EXTREMITIES:  2+ Peripheral Pulses, No clubbing, cyanosis, or edema  LYMPH: No lymphadenopathy noted  SKIN: No rashes or lesions    Consultant(s) Notes Reviewed:  [x ] YES  [ ] NO  Care Discussed with Consultants/Other Providers [ x] YES  [ ] NO    LABS:                        12.1   7.45  )-----------( 238      ( 02 Jan 2022 10:15 )             37.1     01-02    139  |  105  |  7   ----------------------------<  106<H>  3.4<L>   |  22  |  0.83    Ca    8.3<L>      02 Jan 2022 10:15    TPro  6.6  /  Alb  4.1  /  TBili  0.3  /  DBili  x   /  AST  25  /  ALT  22  /  AlkPhos  84  01-02        CAPILLARY BLOOD GLUCOSE                Culture - Stool (collected 31 Dec 2021 21:21)  Source: .Stool Feces  Preliminary Report (01 Jan 2022 17:24):    No enteric pathogens to date: Final culture pending    Culture - Urine (collected 31 Dec 2021 16:17)  Source: Clean Catch Clean Catch (Midstream)  Final Report (02 Jan 2022 11:04):    >=3 organisms. Probable collection contamination.        RADIOLOGY & ADDITIONAL TESTS:    Imaging Personally Reviewed:  [x ] YES  [ ] NO
Patient is a 51y old  Female who presents with a chief complaint of abd pain, vomiting, diarrhea (03 Jan 2022 11:31)      INTERVAL HPI/OVERNIGHT EVENTS: noted  pt seen and examined this am   events noted  improved diarrhea  denies abd pain/n/v    Vital Signs Last 24 Hrs  T(C): 37 (03 Jan 2022 11:59), Max: 37 (03 Jan 2022 11:59)  T(F): 98.6 (03 Jan 2022 11:59), Max: 98.6 (03 Jan 2022 11:59)  HR: 73 (03 Jan 2022 11:59) (68 - 73)  BP: 124/83 (03 Jan 2022 11:59) (117/77 - 124/83)  BP(mean): 97 (03 Jan 2022 11:59) (91 - 97)  RR: 18 (03 Jan 2022 11:59) (18 - 18)  SpO2: 98% (03 Jan 2022 11:59) (97% - 98%)    ciprofloxacin   IVPB 400 milliGRAM(s) IV Intermittent every 12 hours  enoxaparin Injectable 40 milliGRAM(s) SubCutaneous daily  lactated ringers. 1000 milliLiter(s) IV Continuous <Continuous>  metroNIDAZOLE    Tablet 500 milliGRAM(s) Oral every 8 hours  pantoprazole    Tablet 40 milliGRAM(s) Oral before breakfast  sodium chloride 0.9% with potassium chloride 20 mEq/L 1000 milliLiter(s) IV Continuous <Continuous>      PHYSICAL EXAM:  GENERAL: NAD,   EYES: conjunctiva and sclera clear  ENMT: Moist mucous membranes  NECK: Supple, No JVD, Normal thyroid  CHEST/LUNG: non labored, cta b/l  HEART: Regular rate and rhythm; No murmurs, rubs, or gallops  ABDOMEN: Soft, Nontender, Nondistended; Bowel sounds present  EXTREMITIES:  2+ Peripheral Pulses, No clubbing, cyanosis, or edema  LYMPH: No lymphadenopathy noted  SKIN: No rashes or lesions    Consultant(s) Notes Reviewed:  [x ] YES  [ ] NO  Care Discussed with Consultants/Other Providers [ x] YES  [ ] NO    LABS:                        11.9   7.68  )-----------( 225      ( 03 Jan 2022 10:57 )             37.1     01-03    140  |  106  |  7   ----------------------------<  127<H>  3.7   |  22  |  0.84    Ca    8.6      03 Jan 2022 10:56    TPro  6.6  /  Alb  4.1  /  TBili  0.3  /  DBili  x   /  AST  26  /  ALT  24  /  AlkPhos  76  01-03        CAPILLARY BLOOD GLUCOSE                GI PCR Panel, Stool (collected 02 Jan 2022 17:22)  Source: .Stool Feces  Final Report (02 Jan 2022 21:08):    Norovirus GI/GII    DETECTED by PCR    *******Please Note:*******    GI panel PCR evaluates for:    Campylobacter, Plesiomonas shigelloides, Salmonella,    Vibrio, Yersinia enterocolitica, Enteroaggregative    Escherichia coli (EAEC), Enteropathogenic E.coli (EPEC),    Enterotoxigenic E. coli (ETEC) lt/st, Shiga-like    toxin-producing E. coli (STEC) stx1/stx2,    Shigella/ Enteroinvasive E. coli (EIEC), Cryptosporidium,    Cyclospora cayetanensis, Entamoeba histolytica,    Giardia lamblia, Adenovirus F 40/41, Astrovirus,    Norovirus GI/GII, Rotavirus A, Sapovirus    Culture - Stool (collected 31 Dec 2021 21:21)  Source: .Stool Feces  Final Report (02 Jan 2022 16:55):    No enteric pathogens isolated.    (Stool culture examined for Salmonella,    Shigella, Campylobacter, Aeromonas, Plesiomonas,    Vibrio, E.coli O157 and Yersinia)    Culture - Urine (collected 31 Dec 2021 16:17)  Source: Clean Catch Clean Catch (Midstream)  Final Report (02 Jan 2022 11:04):    >=3 organisms. Probable collection contamination.        RADIOLOGY & ADDITIONAL TESTS:    Imaging Personally Reviewed:  [x ] YES  [ ] NO
no

## 2022-01-03 NOTE — PROGRESS NOTE ADULT - REASON FOR ADMISSION
abd pain, vomiting, diarrhea

## 2022-01-03 NOTE — DISCHARGE NOTE PROVIDER - CARE PROVIDER_API CALL
Aubrey Ocampo  INTERNAL MEDICINE  [address] [place] [address], [facility] [address]  [place] [address]  Phone: [phone]  Fax: [phone]  Follow Up Time: 1 week

## 2022-01-03 NOTE — DISCHARGE NOTE PROVIDER - NSDCCPCAREPLAN_GEN_ALL_CORE_FT
PRINCIPAL DISCHARGE DIAGNOSIS  Diagnosis: Norovirus  Assessment and Plan of Treatment: wash hands frequently befor and after meals /toileting   shower daily  do not share towels cups food plates  cleared by infection control for discharge      SECONDARY DISCHARGE DIAGNOSES  Diagnosis: Crohn's disease  Assessment and Plan of Treatment: Follow up with Dr Luis your PCP  Follow up with Dr Heidy JEAN at [facility]  Andi Sloan M.D.   Gastroenterology and Hepatology  [address] [place], [place]  Office: [phone]

## 2022-01-03 NOTE — DISCHARGE NOTE NURSING/CASE MANAGEMENT/SOCIAL WORK - PATIENT PORTAL LINK FT
You can access the FollowMyHealth Patient Portal offered by [facility] by registering at the following website: [email or web address]. By joining [employer]’s FollowMyHealth portal, you will also be able to view your health information using other applications (apps) compatible with our system.

## 2022-05-10 ENCOUNTER — APPOINTMENT (OUTPATIENT)
Dept: CARDIOLOGY | Facility: CLINIC | Age: 52
End: 2022-05-10

## 2022-07-12 ENCOUNTER — NON-APPOINTMENT (OUTPATIENT)
Age: 52
End: 2022-07-12

## 2022-07-12 ENCOUNTER — APPOINTMENT (OUTPATIENT)
Dept: CARDIOLOGY | Facility: CLINIC | Age: 52
End: 2022-07-12

## 2022-07-12 VITALS
HEIGHT: 65 IN | SYSTOLIC BLOOD PRESSURE: 131 MMHG | BODY MASS INDEX: 40.82 KG/M2 | DIASTOLIC BLOOD PRESSURE: 82 MMHG | HEART RATE: 79 BPM | TEMPERATURE: 98 F | OXYGEN SATURATION: 98 % | RESPIRATION RATE: 14 BRPM | WEIGHT: 245 LBS

## 2022-07-12 DIAGNOSIS — E66.9 OBESITY, UNSPECIFIED: ICD-10-CM

## 2022-07-12 PROCEDURE — 99214 OFFICE O/P EST MOD 30 MIN: CPT

## 2022-07-12 PROCEDURE — 93000 ELECTROCARDIOGRAM COMPLETE: CPT

## 2022-07-12 NOTE — DISCUSSION/SUMMARY
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 She denies palpitations, shortness of breath, dizziness or syncope.\par The patient now has a torn meniscus in her right knee complicated by rupture of a "cyst".\par She is going for physical therapy but still has a significant amount of pain in the area and will also follow-up with her orthopedist.\par Electrocardiogram done July 12, 2022 demonstrated normal sinus rhythm rate 78 bpm is otherwise unremarkable.\par Blood work done April 30, 2022 demonstrated hemoglobin A1c of 6.0, potassium of 4.5, cholesterol 205, HDL of 52, triglycerides 137, LDL calculated 126, non-HDL cholesterol 154 mg/dL.\par She will follow-up with her endocrinologist.  I feel that she is a good candidate for GLP-1 agonist therapy for better control of her prediabetes, and potential weight loss.\par She remains hemodynamically stable and asymptomatic from a cardiac standpoint.  She will follow-up with her primary care physician and orthopedist.  She will follow-up with me in 4 months.\par The patient had a normal exercise stress test October 12, 2020.\par Electrocardiogram done August 26, 2020 demonstrates normal sinus rhythm rate 72 bpm is otherwise unremarkable.\par The patient has active Crohn's disease and will be getting another infusion of Remicade this week.  I have asked her to increase her hydration specifically to have 1 bottle of Pedialyte per day for the next week.  \par The patient has been complaining of occasional dyspnea on exertion.  She was at a [employer] party this March 2020,  and started to dance, felt shortness of breath with limited exercise.\par A detailed discussion of lifestyle modification was done today. The patient has a good understanding of the diagnosis, and treatment plan. Lifestyle modification was also outlined.

## 2022-07-12 NOTE — REASON FOR VISIT
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

## 2022-07-12 NOTE — PHYSICAL EXAM
[General Appearance - Well Developed] : well developed [Normal Appearance] : normal appearance [Well Groomed] : well groomed [General Appearance - Well Nourished] : well nourished [No Deformities] : no deformities [General Appearance - In No Acute Distress] : no acute distress [Normal Conjunctiva] : the conjunctiva exhibited no abnormalities [Eyelids - No Xanthelasma] : the eyelids demonstrated no xanthelasmas [Normal Oral Mucosa] : normal oral mucosa [No Oral Pallor] : no oral pallor [No Oral Cyanosis] : no oral cyanosis [Normal Jugular Venous A Waves Present] : normal jugular venous A waves present [Normal Jugular Venous V Waves Present] : normal jugular venous V waves present [No Jugular Venous Rodas A Waves] : no jugular venous rodas A waves [Respiration, Rhythm And Depth] : normal respiratory rhythm and effort [Exaggerated Use Of Accessory Muscles For Inspiration] : no accessory muscle use [Auscultation Breath Sounds / Voice Sounds] : lungs were clear to auscultation bilaterally [Abdomen Soft] : soft [Abdomen Tenderness] : non-tender [Abnormal Walk] : normal gait [Gait - Sufficient For Exercise Testing] : the gait was sufficient for exercise testing [Nail Clubbing] : no clubbing of the fingernails [Cyanosis, Localized] : no localized cyanosis [Petechial Hemorrhages (___cm)] : no petechial hemorrhages [Skin Color & Pigmentation] : normal skin color and pigmentation [] : no rash [No Venous Stasis] : no venous stasis [Skin Lesions] : no skin lesions [No Skin Ulcers] : no skin ulcer [No Xanthoma] : no  xanthoma was observed [Oriented To Time, Place, And Person] : oriented to person, place, and time [Affect] : the affect was normal [Mood] : the mood was normal [No Anxiety] : not feeling anxious [5th Left ICS - MCL] : palpated at the 5th LICS in the midclavicular line [Normal] : normal [No Precordial Heave] : no precordial heave was noted [Normal Rate] : normal [Rhythm Regular] : regular [Normal S1] : normal S1 [Normal S2] : normal S2 [No Gallop] : no gallop heard [I] : a grade 1 [2+] : left 2+ [No Abnormalities] : the abdominal aorta was not enlarged and no bruit was heard [___ +] : bilateral [unfilled]U+ pitting edema to the ankles [Click] : no click [Pericardial Rub] : no pericardial rub

## 2022-08-15 NOTE — ED ADULT NURSE NOTE - NSFALLRSKUNASSIST_ED_ALL_ED
[facility]) Physicians  Neurosurgery and Pain [facility]  [address] [facility] [address] [address]: [phone]  F: [phone]      Charles Dawkins  ()    8/15/2022    Subjective:     Charles Dawkins is 60 y.o. female who complains today of:    Chief Complaint   Patient presents with    Wrist Pain     BILATERAL       HPI     Patient returns stating that both her wrists are hurting her now still having back pain and waiting for DrDanna Mother to do some injections in her back  Allergies:  Latex; 2,4-d dimethylamine (amisol); Mcdonald-2 inhibitors; Iodides; Nalidixic acid; Nsaids;  Shellfish allergy; and Statins    Past Medical History:   Diagnosis Date    Cancer ([facility] [address]     SKIN    Chronic pain     COPD (chronic obstructive pulmonary disease) (HCC)     Depression     Depression     Diabetes mellitus (HCC)     Hypertension     Neuropathy     Osteoarthritis     Sleep apnea     Urinary incontinence      Past Surgical History:   Procedure Laterality Date    JOINT REPLACEMENT      SPINE SURGERY       Family History   Problem Relation Age of Onset    Arthritis Mother     Hypertension Mother     Arthritis Father      Social History     Socioeconomic History    Marital status:      Spouse name: Not on file    Number of children: Not on file    Years of education: Not on file    Highest education level: Not on file   Occupational History    Not on file   Tobacco Use    Smoking status: Former     Packs/day: 1.00     Years: 15.00     Pack years: 15.00     Types: Cigarettes     Quit date: 6/15/2011     Years since quittin.1    Smokeless tobacco: Never   Substance and Sexual Activity    Alcohol use: Never    Drug use: Never    Sexual activity: Not on file   Other Topics Concern    Not on file   Social History Narrative    Not on file     Social Determinants of Health     Financial Resource Strain: Not on file   Food Insecurity: Not on file   Transportation Needs: Not on file   Physical Activity: Not on file   Stress: Not on file   Social Connections: Not on file   Intimate Partner Violence: Not on file   Housing Stability: Not on file       Current Outpatient Medications on File Prior to Visit   Medication Sig Dispense Refill    tiZANidine (ZANAFLEX) 4 MG tablet Take 1 tablet by mouth in the morning and 1 tablet before bedtime. 60 tablet 0    venlafaxine (EFFEXOR XR) 150 MG extended release capsule Take 1 capsule by mouth in the morning. 30 capsule 2    omeprazole (PRILOSEC) 40 MG delayed release capsule Take 1 capsule by mouth in the morning. 90 capsule 0    gabapentin (NEURONTIN) 600 MG tablet TAKE 1 TABLET BY MOUTH THREE TIMES DAILY 90 tablet 2    etodolac (LODINE) 400 MG tablet Take 1 tablet by mouth 2 times daily 60 tablet 2    bumetanide (BUMEX) 1 MG tablet Take 1 mg by mouth daily      dilTIAZem (CARDIZEM CD) 240 MG extended release capsule       insulin glargine (LANTUS;BASAGLAR) 100 UNIT/ML injection pen Inject into the skin      insulin lispro, 1 Unit Dial, 100 UNIT/ML SOPN Inject into the skin      losartan (COZAAR) 50 MG tablet Take 50 mg by mouth 2 times daily       SITagliptin (JANUVIA) 100 MG tablet       lidocaine (LIDODERM) 5 % Place 1 patch onto the skin daily 30 patch 1     Current Facility-Administered Medications on File Prior to Visit   Medication Dose Route Frequency Provider Last Rate Last Admin    iopamidol (ISOVUE-300) 61 % injection 0.5 mL  0.5 mL Other ONCE PRN Fco Brownlee MD             Review of Systems   Constitutional:  Negative for fever. HENT:  Negative for hearing loss. Respiratory:  Negative for shortness of breath. Gastrointestinal:  Negative for constipation, diarrhea and nausea. Genitourinary:  Negative for difficulty urinating. Musculoskeletal:  Negative for back pain and neck pain. Skin:  Negative for rash. Neurological:  Negative for headaches. Hematological:  Does not bruise/bleed easily.    Psychiatric/Behavioral: Negative for sleep disturbance. Objective:     Vitals:  /74 (Site: Left Upper Arm, Position: Sitting, Cuff Size: Large Adult)   Temp 97.7 °F (36.5 °C) (Temporal)   Ht 5' 2\" (1.575 m)   Wt 242 lb (109.8 kg)   BMI 44.26 kg/m² Pain Score:   7      Physical Exam  Vitals reviewed. Constitutional:       Appearance: Normal appearance. Skin:     General: Skin is warm and dry. Neurological:      Mental Status: She is alert. Ortho Exam   Examination of the wrist bilaterally revealed the neurovascular muscle status to be intact mild edema on the left positive Finkelstein's bilaterally fair range of motion in the wrist  Examination lumbar spine with the neurovascular muscular status to be intact positive tension signs bilaterally poor range of motion tenderness to lower lumbar region    Assessment:      Diagnosis Orders   1. Herniation of lumbar intervertebral disc with radiculopathy  predniSONE (DELTASONE) 10 MG tablet      2. De Quervain's tenosynovitis, right  predniSONE (DELTASONE) 10 MG tablet      3. De Quervain's tenosynovitis, left  predniSONE (DELTASONE) 10 MG tablet          Plan:   I am going to put the patient on a prednisone taper I told her to make sure she follows up with Dr. Wen Plan for her back and also can have her come back in a week to inject the left wrist if it still bothering her at that point. Orders Placed This Encounter   Medications    predniSONE (DELTASONE) 10 MG tablet     Sig: Take 1 tablet by mouth in the morning. 1 tab TID x 3 days, 1 tab BID x 3 days, 1 tab QD x 3 days. Dispense:  18 tablet     Refill:  0       No orders of the defined types were placed in this encounter. Follow up:  Return in about 1 week (around 8/22/2022) for injection.     JAVI SEYMOUR DO no

## 2022-08-18 ENCOUNTER — APPOINTMENT (OUTPATIENT)
Dept: ORTHOPEDIC SURGERY | Facility: CLINIC | Age: 52
End: 2022-08-18

## 2022-08-18 VITALS
DIASTOLIC BLOOD PRESSURE: 71 MMHG | HEART RATE: 85 BPM | SYSTOLIC BLOOD PRESSURE: 107 MMHG | HEIGHT: 65 IN | WEIGHT: 245 LBS | BODY MASS INDEX: 40.82 KG/M2

## 2022-08-18 PROCEDURE — 73564 X-RAY EXAM KNEE 4 OR MORE: CPT | Mod: RT

## 2022-08-18 PROCEDURE — 99204 OFFICE O/P NEW MOD 45 MIN: CPT

## 2022-08-18 NOTE — DISCUSSION/SUMMARY
[de-identified] : This patient has right knee medial meniscus contusion and intrasubstance tear.  The patient is not an appropriate candidate for surgical intervention at this time. An extensive discussion was conducted on the natural history of the disease and the variety of surgical and non-surgical options available to the patient including, but not limited to non-steroidal anti-inflammatory medications, steroid injections, physical therapy, maintenance of ideal body weight, and reduction of activity.  Deferring an injection at this time.  She cannot take NSAIDs.  Continue weight-bear as tolerated.  Use a knee sleeve.  She can take Tylenol for pain. The patient will schedule an appointment as needed.\par

## 2022-08-18 NOTE — HISTORY OF PRESENT ILLNESS
[de-identified] : This is very nice 52-year-old female experiencing right knee pain, which is severe in intensity.  He has had pain for 4 months.  Some stability.  No catching clicking locking.  Saw Dr. Braswell for this.  Physical therapy has been helpful.  Has Crohn disease with cannot take NSAIDs.  The pain substantially limits activities of daily living. Walking tolerance is reduced. Medication and activity modification have been minimally effective for a period lasting greater than three months in duration.  Sleep does help.  Oral steroids have not been helpful.  Cortisone injection in May did not help.  Overall pain is been improving over time now.  The patient denies any radiation of the pain to the feet and it is not associated with numbness, tingling, or weakness.

## 2022-08-18 NOTE — PHYSICAL EXAM
[de-identified] : Patient is well nourished, well-developed, in no acute distress, with appropriate mood and affect. The patient is oriented to time, place, and person. Respirations are even and unlabored. Gait evaluation does not reveal a limp. There is no inguinal adenopathy. Examination of the contralateral knee shows normal range of motion, strength, no tenderness, and intact skin. The affected limb is well-perfused and showed 2+ dp/pt pulses, without skin lesions, shows a grossly normal motor and sensory examination. Knee motion is painless and the knee moves from 0 to 135 degrees. The knee is stable within that range-of-motion to AP and ML stress with a 1A Lachman, negative anterior or posterior drawer and no instability to varus or valgus stress. The alignment of the knee is 5 degrees varus. No effusion or crepitus is noted.  Tender to palpation of the medial joint line.  No tenderness to palpation about the lateral joint line, medial or lateral tibial plateau, medial or lateral femoral condyle, medial or lateral patellar facets, superior or inferior pole of the patella. Rober's is positive medially. Muscle strength is normal. Pedal pulses are palpable. Hip examination was negative. [de-identified] : Long standing knee, AP knee, lateral knee, and patellar views of the right knee were ordered and taken in the office and demonstrate no evidence of degenerative joint disease of the knee, fractures, intra-articular pathology.\par \par The patient brings with her an MRI of the right knee for review the imaging with the patient suggesting intrasubstance tear of the medial meniscus but no raquel tear.

## 2022-08-25 ENCOUNTER — APPOINTMENT (OUTPATIENT)
Dept: ORTHOPEDIC SURGERY | Facility: CLINIC | Age: 52
End: 2022-08-25

## 2022-08-25 DIAGNOSIS — S83.241A OTHER TEAR OF MEDIAL MENISCUS, CURRENT INJURY, RIGHT KNEE, INITIAL ENCOUNTER: ICD-10-CM

## 2022-08-25 PROCEDURE — 99442: CPT

## 2022-10-12 ENCOUNTER — APPOINTMENT (OUTPATIENT)
Dept: CARDIOLOGY | Facility: CLINIC | Age: 52
End: 2022-10-12

## 2022-10-12 VITALS
TEMPERATURE: 97.8 F | BODY MASS INDEX: 40.48 KG/M2 | HEIGHT: 65 IN | WEIGHT: 243 LBS | HEART RATE: 86 BPM | DIASTOLIC BLOOD PRESSURE: 82 MMHG | RESPIRATION RATE: 16 BRPM | OXYGEN SATURATION: 98 % | SYSTOLIC BLOOD PRESSURE: 120 MMHG

## 2022-10-12 DIAGNOSIS — R42 DIZZINESS AND GIDDINESS: ICD-10-CM

## 2022-10-12 DIAGNOSIS — K50.90 CROHN'S DISEASE, UNSPECIFIED, W/OUT COMPLICATIONS: ICD-10-CM

## 2022-10-12 PROCEDURE — 99214 OFFICE O/P EST MOD 30 MIN: CPT

## 2022-10-12 PROCEDURE — 93306 TTE W/DOPPLER COMPLETE: CPT

## 2022-10-12 NOTE — DISCUSSION/SUMMARY
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 She denies palpitations, shortness of breath, dizziness or syncope.\par The patient receives Remicade infusions.  She had recent blood work done by her endocrinologist, and then subsequent blood work done by her gastroenterologist.  The subsequent blood work demonstrated a low calcium level of 7.1.  This needs to be repeated and we will do so this week.  When he craziness chronic  works.\par She will have a color echo Doppler examination later today to evaluate her left ventricular function, chamber size, murmur, rule out mitral valve prolapse, and rule out hypertrophy.\par Electrocardiogram done July 12, 2022 demonstrated normal sinus rhythm rate 78 bpm is otherwise unremarkable.\par Blood work done April 30, 2022 demonstrated hemoglobin A1c of 6.0, potassium of 4.5, cholesterol 205, HDL of 52, triglycerides 137, LDL calculated 126, non-HDL cholesterol 154 mg/dL.\par She will follow-up with her endocrinologist.  I feel that she is a good candidate for GLP-1 agonist therapy for better control of her prediabetes, and potential weight loss.\par She remains hemodynamically stable and asymptomatic from a cardiac standpoint.  She will follow-up with her primary care physician and orthopedist.  She will follow-up with me in 4 months.\par The patient had a normal exercise stress test October 12, 2020.\par Electrocardiogram done August 26, 2020 demonstrates normal sinus rhythm rate 72 bpm is otherwise unremarkable.\par The patient has active Crohn's disease and will be getting another infusion of Remicade this week.  I have asked her to increase her hydration specifically to have 1 bottle of Pedialyte per day for the next week.  \par The patient has been complaining of occasional dyspnea on exertion.  She was at a [employer] party this March 2020,  and started to dance, felt shortness of breath with limited exercise.\par A detailed discussion of lifestyle modification was done today. The patient has a good understanding of the diagnosis, and treatment plan. Lifestyle modification was also outlined.

## 2022-10-21 ENCOUNTER — APPOINTMENT (OUTPATIENT)
Dept: ORTHOPEDIC SURGERY | Facility: CLINIC | Age: 52
End: 2022-10-21

## 2022-10-21 VITALS — HEIGHT: 65 IN | BODY MASS INDEX: 40.48 KG/M2 | WEIGHT: 243 LBS

## 2022-10-21 DIAGNOSIS — G89.29 PAIN IN RIGHT KNEE: ICD-10-CM

## 2022-10-21 DIAGNOSIS — M25.561 PAIN IN RIGHT KNEE: ICD-10-CM

## 2022-10-21 PROCEDURE — 99214 OFFICE O/P EST MOD 30 MIN: CPT

## 2022-10-21 NOTE — HISTORY OF PRESENT ILLNESS
[de-identified] : This is very nice 52-year-old female experiencing right knee pain, which is severe in intensity.  She has had pain for 6 months.  No instability. Cannot tolerate NSAIDs.  No catching clicking locking.  Physical therapy has been helpful.  Has Crohn disease with cannot take NSAIDs.  The pain substantially limits activities of daily living. Walking tolerance is reduced but improving. Overall pain is been improving over time now.  The patient denies any radiation of the pain to the feet and it is not associated with numbness, tingling, or weakness.

## 2022-10-21 NOTE — DISCUSSION/SUMMARY
[de-identified] : This patient has right knee medial meniscus contusion and intrasubstance tear.  The patient is not an appropriate candidate for surgical intervention at this time. An extensive discussion was conducted on the natural history of the disease and the variety of surgical and non-surgical options available to the patient including, but not limited to non-steroidal anti-inflammatory medications, steroid injections, physical therapy, maintenance of ideal body weight, and reduction of activity.  Deferring an injection at this time.  She cannot take NSAIDs.  Continue weight-bear as tolerated.  Use a knee sleeve.  She can take Tylenol for pain. The patient will schedule an appointment as needed.\par

## 2022-10-21 NOTE — PHYSICAL EXAM
[de-identified] : Patient is well nourished, well-developed, in no acute distress, with appropriate mood and affect. The patient is oriented to time, place, and person. Respirations are even and unlabored. Gait evaluation does not reveal a limp. There is no inguinal adenopathy. Examination of the contralateral knee shows normal range of motion, strength, no tenderness, and intact skin. The affected limb is well-perfused and showed 2+ dp/pt pulses, without skin lesions, shows a grossly normal motor and sensory examination. Knee motion is painless and the knee moves from 0 to 135 degrees. The knee is stable within that range-of-motion to AP and ML stress with a 1A Lachman, negative anterior or posterior drawer and no instability to varus or valgus stress. The alignment of the knee is 5 degrees varus. No effusion or crepitus is noted.  Tender to palpation of the medial joint line.  No tenderness to palpation about the lateral joint line, medial or lateral tibial plateau, medial or lateral femoral condyle, medial or lateral patellar facets, superior or inferior pole of the patella. Rober's is positive medially. Muscle strength is normal. Pedal pulses are palpable. Hip examination was negative.

## 2022-10-29 ENCOUNTER — APPOINTMENT (OUTPATIENT)
Dept: ORTHOPEDIC SURGERY | Facility: CLINIC | Age: 52
End: 2022-10-29

## 2022-10-29 ENCOUNTER — NON-APPOINTMENT (OUTPATIENT)
Age: 52
End: 2022-10-29

## 2022-10-29 DIAGNOSIS — M65.4 RADIAL STYLOID TENOSYNOVITIS [DE QUERVAIN]: ICD-10-CM

## 2022-10-29 PROCEDURE — 99213 OFFICE O/P EST LOW 20 MIN: CPT

## 2023-01-25 ENCOUNTER — RESULT REVIEW (OUTPATIENT)
Age: 53
End: 2023-01-25

## 2023-01-25 ENCOUNTER — APPOINTMENT (OUTPATIENT)
Dept: OBGYN | Facility: CLINIC | Age: 53
End: 2023-01-25
Payer: COMMERCIAL

## 2023-01-25 VITALS
HEART RATE: 94 BPM | SYSTOLIC BLOOD PRESSURE: 123 MMHG | DIASTOLIC BLOOD PRESSURE: 79 MMHG | WEIGHT: 243 LBS | HEIGHT: 65 IN | BODY MASS INDEX: 40.48 KG/M2

## 2023-01-25 DIAGNOSIS — N83.201 UNSPECIFIED OVARIAN CYST, RIGHT SIDE: ICD-10-CM

## 2023-01-25 DIAGNOSIS — N83.202 UNSPECIFIED OVARIAN CYST, RIGHT SIDE: ICD-10-CM

## 2023-01-25 PROCEDURE — 99203 OFFICE O/P NEW LOW 30 MIN: CPT

## 2023-01-27 NOTE — PLAN
[FreeTextEntry1] : Ct reviewed and pt to have fu pelvic sono to schedule laparoscopic lso rt salpingectomy possible rt ovarian cystectomy the risks of surgery was extensively reviewed to schedule in main.\par During this visit 40 minutes were spent face-to-face with greater than 50% of the time dedicated to counseling.\par

## 2023-01-27 NOTE — HISTORY OF PRESENT ILLNESS
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 She occasionally feels L sided pain which is worse with her period. They have been followed for a few years and were found on imaging for symptoms of Crohn's disease. Alternates with diarrhea or constipation. She is currently feeling well and denies additional complaints. \par \par [facility] MRI 12/30/22: bl simple ovarian cysts, L ovarian cyst 5.2cm. Prior 3.1cm 2018. R sided ovarian cyst 1.1cm. Ut 10x4.5x5.9cm. Multiple small fibroids. Likely adenomyosis from thickened junctional zone. EMS 4mm. \par \par Menses: q25-30d. Periods are getting lighter with age. \par ObHx: P0\par GynHx: cysts, D&C for endometrial polyp\par PMSHx: fibromyalgia, pituitary adenoma s/p removal, vein graft, brachial thrombectomy (after complication from a-line, no heme workup) (needs to restrict L arm), lsc cholecystectomy\par Meds: Remicaide, omeprazole prn \par All: PCN (childhood), Ceftin (hives), Apriso (fever), Asacol (fever), mild delayed skin redness with CT contrast\par Soc: prior smoker renea in 2002, no aclohol, no druge use. Works from home as a  \par FamHx: breast cancer grandmother, HTN, DM, HLD in family \par HCM: mammogram 2022, colonoscopy 2020, pap smear 11/22\par

## 2023-01-30 NOTE — ED ADULT NURSE NOTE - HOW OFTEN DO YOU HAVE A DRINK CONTAINING ALCOHOL?
Continue taking 800mg ibuprofen every 6-8 hours as needed for pain  Follow up with your ob/gyn team within the next few weeks to discuss further options to control your pain  If you develop new or worsening symptoms, please return to the Emergency Department for further evaluation  Never

## 2023-02-09 ENCOUNTER — APPOINTMENT (OUTPATIENT)
Dept: ORTHOPEDIC SURGERY | Facility: CLINIC | Age: 53
End: 2023-02-09

## 2023-02-13 ENCOUNTER — NON-APPOINTMENT (OUTPATIENT)
Age: 53
End: 2023-02-13

## 2023-02-27 ENCOUNTER — NON-APPOINTMENT (OUTPATIENT)
Age: 53
End: 2023-02-27

## 2023-03-02 ENCOUNTER — OUTPATIENT (OUTPATIENT)
Dept: OUTPATIENT SERVICES | Facility: HOSPITAL | Age: 53
LOS: 1 days | End: 2023-03-02
Payer: COMMERCIAL

## 2023-03-02 VITALS
HEART RATE: 80 BPM | WEIGHT: 244.93 LBS | DIASTOLIC BLOOD PRESSURE: 64 MMHG | HEIGHT: 65 IN | SYSTOLIC BLOOD PRESSURE: 95 MMHG | TEMPERATURE: 98 F | RESPIRATION RATE: 16 BRPM | OXYGEN SATURATION: 97 %

## 2023-03-02 DIAGNOSIS — N83.201 UNSPECIFIED OVARIAN CYST, RIGHT SIDE: ICD-10-CM

## 2023-03-02 DIAGNOSIS — Z86.39 PERSONAL HISTORY OF OTHER ENDOCRINE, NUTRITIONAL AND METABOLIC DISEASE: Chronic | ICD-10-CM

## 2023-03-02 DIAGNOSIS — N83.202 UNSPECIFIED OVARIAN CYST, LEFT SIDE: ICD-10-CM

## 2023-03-02 DIAGNOSIS — Z01.818 ENCOUNTER FOR OTHER PREPROCEDURAL EXAMINATION: ICD-10-CM

## 2023-03-02 DIAGNOSIS — I82.90 ACUTE EMBOLISM AND THROMBOSIS OF UNSPECIFIED VEIN: Chronic | ICD-10-CM

## 2023-03-02 DIAGNOSIS — Z98.89 OTHER SPECIFIED POSTPROCEDURAL STATES: Chronic | ICD-10-CM

## 2023-03-02 DIAGNOSIS — N83.209 UNSPECIFIED OVARIAN CYST, UNSPECIFIED SIDE: ICD-10-CM

## 2023-03-02 DIAGNOSIS — M79.7 FIBROMYALGIA: ICD-10-CM

## 2023-03-02 DIAGNOSIS — K21.9 GASTRO-ESOPHAGEAL REFLUX DISEASE WITHOUT ESOPHAGITIS: ICD-10-CM

## 2023-03-02 LAB
ALBUMIN SERPL ELPH-MCNC: 4.1 G/DL — SIGNIFICANT CHANGE UP (ref 3.3–5)
ALP SERPL-CCNC: 110 U/L — SIGNIFICANT CHANGE UP (ref 40–120)
ALT FLD-CCNC: 18 U/L — SIGNIFICANT CHANGE UP (ref 10–45)
ANION GAP SERPL CALC-SCNC: 9 MMOL/L — SIGNIFICANT CHANGE UP (ref 5–17)
AST SERPL-CCNC: 19 U/L — SIGNIFICANT CHANGE UP (ref 10–40)
BILIRUB SERPL-MCNC: 0.3 MG/DL — SIGNIFICANT CHANGE UP (ref 0.2–1.2)
BLD GP AB SCN SERPL QL: NEGATIVE — SIGNIFICANT CHANGE UP
BUN SERPL-MCNC: 14 MG/DL — SIGNIFICANT CHANGE UP (ref 7–23)
CALCIUM SERPL-MCNC: 9.2 MG/DL — SIGNIFICANT CHANGE UP (ref 8.4–10.5)
CHLORIDE SERPL-SCNC: 103 MMOL/L — SIGNIFICANT CHANGE UP (ref 96–108)
CO2 SERPL-SCNC: 27 MMOL/L — SIGNIFICANT CHANGE UP (ref 22–31)
CREAT SERPL-MCNC: 0.81 MG/DL — SIGNIFICANT CHANGE UP (ref 0.5–1.3)
EGFR: 87 ML/MIN/1.73M2 — SIGNIFICANT CHANGE UP
GLUCOSE SERPL-MCNC: 115 MG/DL — HIGH (ref 70–99)
HCT VFR BLD CALC: 38.9 % — SIGNIFICANT CHANGE UP (ref 34.5–45)
HGB BLD-MCNC: 12.2 G/DL — SIGNIFICANT CHANGE UP (ref 11.5–15.5)
MCHC RBC-ENTMCNC: 26.1 PG — LOW (ref 27–34)
MCHC RBC-ENTMCNC: 31.4 GM/DL — LOW (ref 32–36)
MCV RBC AUTO: 83.1 FL — SIGNIFICANT CHANGE UP (ref 80–100)
NRBC # BLD: 0 /100 WBCS — SIGNIFICANT CHANGE UP (ref 0–0)
PLATELET # BLD AUTO: 258 K/UL — SIGNIFICANT CHANGE UP (ref 150–400)
POTASSIUM SERPL-MCNC: 4.2 MMOL/L — SIGNIFICANT CHANGE UP (ref 3.5–5.3)
POTASSIUM SERPL-SCNC: 4.2 MMOL/L — SIGNIFICANT CHANGE UP (ref 3.5–5.3)
PROT SERPL-MCNC: 6.9 G/DL — SIGNIFICANT CHANGE UP (ref 6–8.3)
RBC # BLD: 4.68 M/UL — SIGNIFICANT CHANGE UP (ref 3.8–5.2)
RBC # FLD: 15.1 % — HIGH (ref 10.3–14.5)
RH IG SCN BLD-IMP: POSITIVE — SIGNIFICANT CHANGE UP
SODIUM SERPL-SCNC: 139 MMOL/L — SIGNIFICANT CHANGE UP (ref 135–145)
WBC # BLD: 8.24 K/UL — SIGNIFICANT CHANGE UP (ref 3.8–10.5)
WBC # FLD AUTO: 8.24 K/UL — SIGNIFICANT CHANGE UP (ref 3.8–10.5)

## 2023-03-02 PROCEDURE — 86901 BLOOD TYPING SEROLOGIC RH(D): CPT

## 2023-03-02 PROCEDURE — G0463: CPT

## 2023-03-02 PROCEDURE — 86850 RBC ANTIBODY SCREEN: CPT

## 2023-03-02 PROCEDURE — 86900 BLOOD TYPING SEROLOGIC ABO: CPT

## 2023-03-02 PROCEDURE — 80053 COMPREHEN METABOLIC PANEL: CPT

## 2023-03-02 PROCEDURE — 85027 COMPLETE CBC AUTOMATED: CPT

## 2023-03-02 PROCEDURE — 36415 COLL VENOUS BLD VENIPUNCTURE: CPT

## 2023-03-02 RX ORDER — ACETAMINOPHEN 500 MG
1000 TABLET ORAL ONCE
Refills: 0 | Status: DISCONTINUED | OUTPATIENT
Start: 2023-03-23 | End: 2023-04-06

## 2023-03-02 RX ORDER — CEFOTETAN DISODIUM 1 G
2 VIAL (EA) INJECTION ONCE
Refills: 0 | Status: DISCONTINUED | OUTPATIENT
Start: 2023-03-23 | End: 2023-04-06

## 2023-03-02 RX ORDER — OMEPRAZOLE 10 MG/1
1 CAPSULE, DELAYED RELEASE ORAL
Qty: 0 | Refills: 0 | DISCHARGE

## 2023-03-02 NOTE — H&P PST ADULT - OTHER CARE PROVIDERS
Vascular Arthur Grover, MIRANDA Villareal, cardiologist Dr. Jr Sevilla, endocrine [facility]

## 2023-03-02 NOTE — H&P PST ADULT - ATTENDING COMMENTS
Patient with leftadnexal cyst for lso rt salpingectomy possible rt ovarian cystectomy the risks and alternatives hav been discussed.

## 2023-03-02 NOTE — H&P PST ADULT - ANESTHESIA, PREVIOUS REACTION, PROFILE
nausea/vomiting due to h/o RUE arterial thrombus during surgery in 2014, pt is requesting not to have the same anesthesiologist,/nausea/vomiting

## 2023-03-02 NOTE — H&P PST ADULT - NSICDXPASTMEDICALHX_GEN_ALL_CORE_FT
PAST MEDICAL HISTORY:  Benign neoplasm of pituitary gland     Breast nodule right breast   been monitored   yearly sonogram    Crohn disease dx 2012    Fibromyalgia     Gastro - Esophageal Reflux Disease     H/O hypercholesterolemia no medications  controlled with diet and exercise    Herniated disc, cervical     Ovarian cyst     Plantar fasciitis bilateral  on therapy    Temporal mandibular joint disorder     Thrombus due to any device, implant or graft, sequela RUE arterial thrombus, complication of pituitary adenoma resection 2014, s/p thrombectomy at the time

## 2023-03-02 NOTE — H&P PST ADULT - PROBLEM SELECTOR PLAN 1
Laparoscopic right salpingectomy, possible ovarian cystectomy  Labs- CBC, CMP, T&S  Pre op instructions discussed, all questions answered

## 2023-03-02 NOTE — H&P PST ADULT - NSHP PST SURGERY DATE_DT_GEN_A_CORE
Patient refused the 4mg of morphine . Patient stated that 2 mg of morphine starts working ,ice pack offered ,will continue to moniter
23-Mar-2023

## 2023-03-02 NOTE — H&P PST ADULT - HISTORY OF PRESENT ILLNESS
53 ear old female h/o fibromyalgia, benign pituitary tumor (s/p excision 2014), Crohn's disease (on remicade- last dose 3/13/2023). endometrial polyp, now scheduled for operative hysteroscopy, resection of endometrial polyp. 53 ear old female h/o fibromyalgia, benign pituitary tumor (s/p excision 2014), Crohn's disease (on remicade- last dose 3/13/2023). endometrial poly s/p endometrial polypectomy, c/o LLQ abdominal pain x few months- s/p abdominal US/MRI revealed bilateral ovarian cst. Pt scheduled for laparoscopic right salpingectomy, possible ovarian cystectomy on 3/23/2023      **Denies any fever, chills, CP/palpitations or sick contacts   **Pre op Covid 19 PCR on 3/20/23 @ [facility] 54 y/o female with  h/o fibromyalgia, benign pituitary tumor (s/p excision 2014), Crohn's disease (on Remicade- last dose 3/13/2023). endo metrial poly s/p endometrial polypectomy, c/o LLQ abdominal pain x few months- s/p abdominal US/MRI revealed bilateral ovarian cyst. Pt scheduled for laparoscopic right salpingectomy, possible ovarian cystectomy on 3/23/2023      **Denies any fever, chills, CP/palpitations or sick contacts   **Pre op Covid 19 PCR on 3/20/23 @ [facility]

## 2023-03-02 NOTE — H&P PST ADULT - CARDIOVASCULAR
regular rate and rhythm/S1 S2 present negative regular rate and rhythm/S1 S2 present/no murmur/no JVD

## 2023-03-06 PROBLEM — N83.209 UNSPECIFIED OVARIAN CYST, UNSPECIFIED SIDE: Chronic | Status: ACTIVE | Noted: 2023-03-02

## 2023-03-13 ENCOUNTER — APPOINTMENT (OUTPATIENT)
Dept: ULTRASOUND IMAGING | Facility: IMAGING CENTER | Age: 53
End: 2023-03-13
Payer: COMMERCIAL

## 2023-03-13 ENCOUNTER — RESULT REVIEW (OUTPATIENT)
Age: 53
End: 2023-03-13

## 2023-03-13 ENCOUNTER — OUTPATIENT (OUTPATIENT)
Dept: OUTPATIENT SERVICES | Facility: HOSPITAL | Age: 53
LOS: 1 days | End: 2023-03-13
Payer: COMMERCIAL

## 2023-03-13 DIAGNOSIS — Z98.89 OTHER SPECIFIED POSTPROCEDURAL STATES: Chronic | ICD-10-CM

## 2023-03-13 DIAGNOSIS — N83.201 UNSPECIFIED OVARIAN CYST, RIGHT SIDE: ICD-10-CM

## 2023-03-13 DIAGNOSIS — Z86.39 PERSONAL HISTORY OF OTHER ENDOCRINE, NUTRITIONAL AND METABOLIC DISEASE: Chronic | ICD-10-CM

## 2023-03-13 DIAGNOSIS — I82.90 ACUTE EMBOLISM AND THROMBOSIS OF UNSPECIFIED VEIN: Chronic | ICD-10-CM

## 2023-03-13 PROCEDURE — 76830 TRANSVAGINAL US NON-OB: CPT | Mod: 26

## 2023-03-13 PROCEDURE — 76830 TRANSVAGINAL US NON-OB: CPT

## 2023-03-13 PROCEDURE — 76856 US EXAM PELVIC COMPLETE: CPT

## 2023-03-13 PROCEDURE — 76856 US EXAM PELVIC COMPLETE: CPT | Mod: 26,59

## 2023-03-14 ENCOUNTER — NON-APPOINTMENT (OUTPATIENT)
Age: 53
End: 2023-03-14

## 2023-03-15 ENCOUNTER — NON-APPOINTMENT (OUTPATIENT)
Age: 53
End: 2023-03-15

## 2023-03-15 DIAGNOSIS — Z11.52 ENCOUNTER FOR SCREENING FOR COVID-19: ICD-10-CM

## 2023-03-22 ENCOUNTER — TRANSCRIPTION ENCOUNTER (OUTPATIENT)
Age: 53
End: 2023-03-22

## 2023-03-23 ENCOUNTER — RESULT REVIEW (OUTPATIENT)
Age: 53
End: 2023-03-23

## 2023-03-23 ENCOUNTER — APPOINTMENT (OUTPATIENT)
Dept: OBGYN | Facility: HOSPITAL | Age: 53
End: 2023-03-23

## 2023-03-23 ENCOUNTER — TRANSCRIPTION ENCOUNTER (OUTPATIENT)
Age: 53
End: 2023-03-23

## 2023-03-23 ENCOUNTER — OUTPATIENT (OUTPATIENT)
Dept: INPATIENT UNIT | Facility: HOSPITAL | Age: 53
LOS: 1 days | End: 2023-03-23
Payer: COMMERCIAL

## 2023-03-23 VITALS
OXYGEN SATURATION: 98 % | SYSTOLIC BLOOD PRESSURE: 127 MMHG | HEART RATE: 97 BPM | RESPIRATION RATE: 18 BRPM | HEIGHT: 65 IN | TEMPERATURE: 98 F | DIASTOLIC BLOOD PRESSURE: 73 MMHG | WEIGHT: 244.93 LBS

## 2023-03-23 VITALS
HEART RATE: 90 BPM | TEMPERATURE: 98 F | RESPIRATION RATE: 16 BRPM | DIASTOLIC BLOOD PRESSURE: 71 MMHG | SYSTOLIC BLOOD PRESSURE: 100 MMHG | OXYGEN SATURATION: 100 %

## 2023-03-23 DIAGNOSIS — Z98.89 OTHER SPECIFIED POSTPROCEDURAL STATES: Chronic | ICD-10-CM

## 2023-03-23 DIAGNOSIS — Z86.39 PERSONAL HISTORY OF OTHER ENDOCRINE, NUTRITIONAL AND METABOLIC DISEASE: Chronic | ICD-10-CM

## 2023-03-23 DIAGNOSIS — N83.201 UNSPECIFIED OVARIAN CYST, RIGHT SIDE: ICD-10-CM

## 2023-03-23 DIAGNOSIS — I82.90 ACUTE EMBOLISM AND THROMBOSIS OF UNSPECIFIED VEIN: Chronic | ICD-10-CM

## 2023-03-23 DIAGNOSIS — N83.202 UNSPECIFIED OVARIAN CYST, LEFT SIDE: ICD-10-CM

## 2023-03-23 LAB — HCG UR QL: NEGATIVE — SIGNIFICANT CHANGE UP

## 2023-03-23 PROCEDURE — 88305 TISSUE EXAM BY PATHOLOGIST: CPT | Mod: 26

## 2023-03-23 PROCEDURE — 88331 PATH CONSLTJ SURG 1 BLK 1SPC: CPT

## 2023-03-23 PROCEDURE — 58661 LAPAROSCOPY REMOVE ADNEXA: CPT

## 2023-03-23 PROCEDURE — 81025 URINE PREGNANCY TEST: CPT

## 2023-03-23 PROCEDURE — 88305 TISSUE EXAM BY PATHOLOGIST: CPT

## 2023-03-23 PROCEDURE — 88331 PATH CONSLTJ SURG 1 BLK 1SPC: CPT | Mod: 26

## 2023-03-23 PROCEDURE — 88112 CYTOPATH CELL ENHANCE TECH: CPT | Mod: 26

## 2023-03-23 PROCEDURE — C1889: CPT

## 2023-03-23 PROCEDURE — 88112 CYTOPATH CELL ENHANCE TECH: CPT

## 2023-03-23 PROCEDURE — 88307 TISSUE EXAM BY PATHOLOGIST: CPT | Mod: 26

## 2023-03-23 PROCEDURE — C9399: CPT

## 2023-03-23 PROCEDURE — 88307 TISSUE EXAM BY PATHOLOGIST: CPT

## 2023-03-23 DEVICE — IMPLANTABLE DEVICE
Type: IMPLANTABLE DEVICE | Status: NON-FUNCTIONAL
Removed: 2023-03-23

## 2023-03-23 DEVICE — SURGICEL FIBRILLAR 2 X 4"
Type: IMPLANTABLE DEVICE | Status: NON-FUNCTIONAL
Removed: 2023-03-23

## 2023-03-23 RX ORDER — SODIUM CHLORIDE 9 MG/ML
1000 INJECTION, SOLUTION INTRAVENOUS
Refills: 0 | Status: DISCONTINUED | OUTPATIENT
Start: 2023-03-23 | End: 2023-04-06

## 2023-03-23 RX ORDER — SODIUM CHLORIDE 9 MG/ML
3 INJECTION INTRAMUSCULAR; INTRAVENOUS; SUBCUTANEOUS EVERY 8 HOURS
Refills: 0 | Status: DISCONTINUED | OUTPATIENT
Start: 2023-03-23 | End: 2023-03-23

## 2023-03-23 RX ORDER — OXYCODONE HYDROCHLORIDE 5 MG/1
1 TABLET ORAL
Qty: 0 | Refills: 0 | DISCHARGE

## 2023-03-23 RX ORDER — ONDANSETRON 8 MG/1
4 TABLET, FILM COATED ORAL ONCE
Refills: 0 | Status: DISCONTINUED | OUTPATIENT
Start: 2023-03-23 | End: 2023-03-23

## 2023-03-23 RX ORDER — OMEPRAZOLE 10 MG/1
1 CAPSULE, DELAYED RELEASE ORAL
Qty: 0 | Refills: 0 | DISCHARGE

## 2023-03-23 RX ORDER — HYDROMORPHONE HYDROCHLORIDE 2 MG/ML
0.5 INJECTION INTRAMUSCULAR; INTRAVENOUS; SUBCUTANEOUS
Refills: 0 | Status: DISCONTINUED | OUTPATIENT
Start: 2023-03-23 | End: 2023-03-23

## 2023-03-23 RX ORDER — POLYETHYLENE GLYCOL 3350 17 G/17G
0 POWDER, FOR SOLUTION ORAL
Qty: 0 | Refills: 0 | DISCHARGE

## 2023-03-23 RX ORDER — FAMOTIDINE 10 MG/ML
20 INJECTION INTRAVENOUS ONCE
Refills: 0 | Status: COMPLETED | OUTPATIENT
Start: 2023-03-23 | End: 2023-03-23

## 2023-03-23 RX ORDER — OXYCODONE HYDROCHLORIDE 5 MG/1
1 TABLET ORAL
Qty: 8 | Refills: 0
Start: 2023-03-23

## 2023-03-23 RX ORDER — CHLORHEXIDINE GLUCONATE 213 G/1000ML
1 SOLUTION TOPICAL ONCE
Refills: 0 | Status: DISCONTINUED | OUTPATIENT
Start: 2023-03-23 | End: 2023-03-23

## 2023-03-23 RX ORDER — RIVAROXABAN 15 MG-20MG
1 KIT ORAL
Qty: 10 | Refills: 0
Start: 2023-03-23 | End: 2023-04-01

## 2023-03-23 RX ORDER — DIPHENHYDRAMINE HCL 50 MG
25 CAPSULE ORAL ONCE
Refills: 0 | Status: COMPLETED | OUTPATIENT
Start: 2023-03-23 | End: 2023-03-23

## 2023-03-23 RX ORDER — INFLIXIMAB-DYYB 120 MG/ML
0 INJECTION SUBCUTANEOUS
Qty: 0 | Refills: 0 | DISCHARGE

## 2023-03-23 RX ORDER — ACETAMINOPHEN 500 MG
3 TABLET ORAL
Qty: 0 | Refills: 0 | DISCHARGE

## 2023-03-23 RX ORDER — LIDOCAINE HCL 20 MG/ML
0.2 VIAL (ML) INJECTION ONCE
Refills: 0 | Status: DISCONTINUED | OUTPATIENT
Start: 2023-03-23 | End: 2023-03-23

## 2023-03-23 RX ORDER — APREPITANT 80 MG/1
40 CAPSULE ORAL ONCE
Refills: 0 | Status: COMPLETED | OUTPATIENT
Start: 2023-03-23 | End: 2023-03-23

## 2023-03-23 RX ADMIN — FAMOTIDINE 20 MILLIGRAM(S): 10 INJECTION INTRAVENOUS at 07:28

## 2023-03-23 RX ADMIN — APREPITANT 40 MILLIGRAM(S): 80 CAPSULE ORAL at 07:28

## 2023-03-23 RX ADMIN — Medication 25 MILLIGRAM(S): at 13:13

## 2023-03-23 NOTE — BRIEF OPERATIVE NOTE - NSICDXBRIEFPROCEDURE_GEN_ALL_CORE_FT
PROCEDURES:  Laparoscopic salpingo-oophorectomy, left 23-Mar-2023 10:55:28  Nancy Anthony  Right salpingectomy 23-Mar-2023 10:55:38  Nancy Anthony  Laparoscopic right ovarian cystectomy 23-Mar-2023 10:55:49  Nancy Anthony

## 2023-03-23 NOTE — ASU DISCHARGE PLAN (ADULT/PEDIATRIC) - CARE PROVIDER_API CALL
Bonifacio Meyers)  Obstetrics and Gynecology  [address] [place] [address], [address]  [place] [address]  Phone: [phone]  Fax: [phone]  Follow Up Time:

## 2023-03-23 NOTE — BRIEF OPERATIVE NOTE - COMMENTS
Vistaseal placed on all operative sites. Fibrillar placed on area of left pelvic sidewall dissection Vistaseal placed on all operative sites. Fibrillar placed on area of left pelvic sidewall dissection  Dictation #[id number]

## 2023-03-23 NOTE — CHART NOTE - NSCHARTNOTEFT_GEN_A_CORE
GYN POST-OP CHECK    Allergies: Apriso (Fever); Asacol (Fever); ceftin and medications she does not know what name is (Unknown); contrast media (iodine-based) (Other); heparin (Rash); Medrol Dosepak (Other)    Intolerances: adhesives (Other)    S: Pt awake and alert resting comfortably in bed. Pain controlled. Reports occasional periods of nausea. Denies SOB, CP, palpitations. Tolerates clears.  Not OOB yet.    O:   T(C): 36.6 (03-23-23 @ 10:36), Max: 36.6 (03-23-23 @ 10:36)  HR: 80 (03-23-23 @ 13:00) (80 - 101)  BP: 124/71 (03-23-23 @ 13:00) (98/54 - 124/71)  RR: 16 (03-23-23 @ 13:00) (14 - 16)  SpO2: 98% (03-23-23 @ 13:00) (95% - 100%)  I&O's Summary    23 Mar 2023 07:01  -  23 Mar 2023 13:28  --------------------------------------------------------  IN: 250 mL / OUT: 0 mL / NET: 250 mL    Gen: NAD. A+Ox3.  CV: RRR  Lungs: CTA B/L  Abd: soft, gently distended and appropriately tender.  Inc: 4 port sites open to air w/o drainage, redness, or ecchymosis   Ext: PAS in place    A/P: 53y Female with h/p left ovarian cyst POD #0 from laparoscopic left salpingo-oopherectomy, right salpingectomy, and right ovarian cystectomy.     PAST MEDICAL & SURGICAL HISTORY:  Gastro - Esophageal Reflux Disease    Benign neoplasm of pituitary gland    Crohn disease    H/O hypercholesterolemia    Breast nodule    Plantar fasciitis    Temporal mandibular joint disorder    Fibromyalgia    RUE arterial thrombus, complication of pituitary adenoma resection 2014, s/p thrombectomy at the time    Herniated disc, cervical    Ovarian cyst    History of laparoscopic cholecystectomy 2010    H/O colonoscopy    H/O endoscopy    History of pituitary adenoma 2014    Plan    1. Neuro: Analgesia PRN. acetaminophen     Tablet .. 1000 milliGRAM(s) Oral once  HYDROmorphone  Injectable 0.5 milliGRAM(s) IV Push every 10 minutes PRN  ondansetron Injectable 4 milliGRAM(s) IV Push once PRN  2. Card: Monitor VS.  3. Pulm: Incentive spirometer use.  4. GI: Advance to regular diet. Anti-emetics PRN.  5. : Delgado to gravity. DTV by 6p  6. Electrolytes: LR@75cc/hr.  7. DVT ppx w/ PAS while in bed. Early ambulation, initially with assistance then as tolerated.  8. Discharge from PACU when criteria met.     d/w GYN team.  Meg Perez FNP-BC

## 2023-03-23 NOTE — ASU DISCHARGE PLAN (ADULT/PEDIATRIC) - MEDICATION INSTRUCTIONS
Take tylenol as needed for pain. A prescription for oxycodone was sent to your pharmacy. Please take this medication for severe pain not relieved by tylenol.

## 2023-03-23 NOTE — ASU PATIENT PROFILE, ADULT - FALL HARM RISK - UNIVERSAL INTERVENTIONS
Bed in lowest position, wheels locked, appropriate side rails in place/Call bell, personal items and telephone in reach/Instruct patient to call for assistance before getting out of bed or chair/Non-slip footwear when patient is out of bed/[place] to call system/Physically safe environment - no spills, clutter or unnecessary equipment/Purposeful Proactive Rounding/Room/bathroom lighting operational, light cord in reach

## 2023-03-23 NOTE — BRIEF OPERATIVE NOTE - OPERATION/FINDINGS
EUA: Nonenlarged uterus. Palpable right adnexal mass, mobile.   Operative: Grossly normal uterus and bilateral fallopian tubes. Two small simple appearing cyst on right ovary. Left ovary with approx 7cm, smooth walled cyst. No intra-op spillage. Controlled rupture of cyst in endocatch bag with straw colored fluid. Physiologic adhesions of sigmoid colon to left side wall. Normal abdominal survey including normal appendix. Atraumatic entry sites.   Frozen=Serous cystadenoma

## 2023-03-23 NOTE — BRIEF OPERATIVE NOTE - SPECIMENS
1. Left fallopian tube and ovary 2. Right ovarian cyst 3. Right fallopian tube, 4. Pelvic washings (cytology)

## 2023-03-23 NOTE — ASU DISCHARGE PLAN (ADULT/PEDIATRIC) - ASU DC SPECIAL INSTRUCTIONSFT
Regular diet as tolerated, regular activity as tolerated, no heavy lifting for first two weeks.  Nothing per vagina: no intercourse, tampons or douching.  Call your provider if you experience fevers, chills, worsening abdominal pain, inability to urinate or worsening vaginal bleeding.    A 10 day course of Eliquis (a blood thinner) was sent to your pharmacy to prevent blood clots in the postoperative period. Please take this medication daily. Call if you have any concerns about bleeding or excessive bruising.     Follow up with your provider in 2 weeks.

## 2023-03-24 LAB — NON-GYNECOLOGICAL CYTOLOGY STUDY: SIGNIFICANT CHANGE UP

## 2023-03-25 ENCOUNTER — NON-APPOINTMENT (OUTPATIENT)
Age: 53
End: 2023-03-25

## 2023-03-27 ENCOUNTER — NON-APPOINTMENT (OUTPATIENT)
Age: 53
End: 2023-03-27

## 2023-03-30 DIAGNOSIS — R39.89 OTHER SYMPTOMS AND SIGNS INVOLVING THE GENITOURINARY SYSTEM: ICD-10-CM

## 2023-04-03 DIAGNOSIS — N39.0 URINARY TRACT INFECTION, SITE NOT SPECIFIED: ICD-10-CM

## 2023-04-04 LAB
APPEARANCE: CLEAR
BACTERIA: NEGATIVE
BILIRUBIN URINE: NEGATIVE
BLOOD URINE: NEGATIVE
COLOR: NORMAL
GLUCOSE QUALITATIVE U: NEGATIVE
HYALINE CASTS: 0 /LPF
KETONES URINE: NEGATIVE
LEUKOCYTE ESTERASE URINE: NEGATIVE
MICROSCOPIC-UA: NORMAL
NITRITE URINE: NEGATIVE
PH URINE: 5.5
PROTEIN URINE: NEGATIVE
RED BLOOD CELLS URINE: 2 /HPF
SARS-COV-2 N GENE NPH QL NAA+PROBE: NOT DETECTED
SPECIFIC GRAVITY URINE: 1.02
SQUAMOUS EPITHELIAL CELLS: 1 /HPF
SURGICAL PATHOLOGY STUDY: SIGNIFICANT CHANGE UP
UROBILINOGEN URINE: NORMAL
WHITE BLOOD CELLS URINE: 1 /HPF

## 2023-04-05 ENCOUNTER — NON-APPOINTMENT (OUTPATIENT)
Age: 53
End: 2023-04-05

## 2023-04-10 ENCOUNTER — APPOINTMENT (OUTPATIENT)
Dept: OBGYN | Facility: CLINIC | Age: 53
End: 2023-04-10
Payer: COMMERCIAL

## 2023-04-10 VITALS
HEIGHT: 65 IN | SYSTOLIC BLOOD PRESSURE: 108 MMHG | DIASTOLIC BLOOD PRESSURE: 69 MMHG | HEART RATE: 80 BPM | BODY MASS INDEX: 40.48 KG/M2 | WEIGHT: 243 LBS

## 2023-04-10 PROCEDURE — 99024 POSTOP FOLLOW-UP VISIT: CPT

## 2023-04-12 ENCOUNTER — NON-APPOINTMENT (OUTPATIENT)
Age: 53
End: 2023-04-12

## 2023-04-12 ENCOUNTER — APPOINTMENT (OUTPATIENT)
Dept: CARDIOLOGY | Facility: CLINIC | Age: 53
End: 2023-04-12
Payer: COMMERCIAL

## 2023-04-12 VITALS
DIASTOLIC BLOOD PRESSURE: 76 MMHG | TEMPERATURE: 97 F | HEIGHT: 65 IN | BODY MASS INDEX: 40.48 KG/M2 | HEART RATE: 78 BPM | WEIGHT: 243 LBS | RESPIRATION RATE: 16 BRPM | SYSTOLIC BLOOD PRESSURE: 118 MMHG | OXYGEN SATURATION: 97 %

## 2023-04-12 DIAGNOSIS — Z86.79 PERSONAL HISTORY OF OTHER DISEASES OF THE CIRCULATORY SYSTEM: ICD-10-CM

## 2023-04-12 LAB
APPEARANCE: CLEAR
BACTERIA UR CULT: NORMAL
BILIRUBIN URINE: NEGATIVE
BLOOD URINE: NEGATIVE
COLOR: NORMAL
GLUCOSE QUALITATIVE U: NEGATIVE
KETONES URINE: NEGATIVE
LEUKOCYTE ESTERASE URINE: NEGATIVE
NITRITE URINE: NEGATIVE
PH URINE: 5.5
PROTEIN URINE: NEGATIVE
SPECIFIC GRAVITY URINE: 1.02
UROBILINOGEN URINE: NORMAL

## 2023-04-12 PROCEDURE — 93000 ELECTROCARDIOGRAM COMPLETE: CPT

## 2023-04-12 PROCEDURE — 99214 OFFICE O/P EST MOD 30 MIN: CPT | Mod: 25

## 2023-04-12 NOTE — DISCUSSION/SUMMARY
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 She denies palpitations, shortness of breath, dizziness or syncope.\par The patient is recovering from a recent surgery March 2023 to remove an ovary.\par Electrocardiogram done April 12, 2023 demonstrated normal sinus rhythm rate 78 bpm is otherwise unremarkable.\par Blood work done March 9, 2023 demonstrated cholesterol 202, HDL 45, triglycerides 127, LDL calculated 131, non-HDL cholesterol 157 mg/dL with hemoglobin A1c of 6.1.  Her vitamin D was low at that time of 6.8.\par She is currently hemodynamically stable and asymptomatic from cardiac standpoint.\par The patient receives Remicade infusions.  She had recent blood work done by her endocrinologist, and then subsequent blood work done by her gastroenterologist.  The subsequent blood work demonstrated a low calcium level of 7.1.  This needs to be repeated and we will do so this week.  When he craziness chronic  works.\par She will have a color echo Doppler examination later today to evaluate her left ventricular function, chamber size, murmur, rule out mitral valve prolapse, and rule out hypertrophy.\par Electrocardiogram done July 12, 2022 demonstrated normal sinus rhythm rate 78 bpm is otherwise unremarkable.\par Blood work done April 30, 2022 demonstrated hemoglobin A1c of 6.0, potassium of 4.5, cholesterol 205, HDL of 52, triglycerides 137, LDL calculated 126, non-HDL cholesterol 154 mg/dL.\par She will follow-up with her endocrinologist.  I feel that she is a good candidate for GLP-1 agonist therapy for better control of her prediabetes, and potential weight loss.\par \par The patient had a normal exercise stress test October 12, 2020.\par Electrocardiogram done August 26, 2020 demonstrates normal sinus rhythm rate 72 bpm is otherwise unremarkable.\par The patient has active Crohn's disease and will be getting another infusion of Remicade this week.  I have asked her to increase her hydration specifically to have 1 bottle of Pedialyte per day for the next week.  \par The patient has been complaining of occasional dyspnea on exertion.  She was at a [employer] party this March 2020,  and started to dance, felt shortness of breath with limited exercise.\par A detailed discussion of lifestyle modification was done today. The patient has a good understanding of the diagnosis, and treatment plan. Lifestyle modification was also outlined.

## 2023-04-12 NOTE — HISTORY OF PRESENT ILLNESS
[Pain is well-controlled] : pain is well-controlled [Fever] : no fever [Chills] : no chills [Nausea] : no nausea [Vomiting] : no vomiting [Clean/Dry/Intact] : clean, dry and intact [Erythema] : not erythematous [None] : no vaginal bleeding [Normal] : normal [Pathology reviewed] : pathology reviewed [de-identified] : pt is sp laparoscopic lso rtt salpingectomy path benign s/p tx for uti feeling well

## 2023-04-13 RX ORDER — DICLOFENAC SODIUM 1% 10 MG/G
1 GEL TOPICAL
Qty: 100 | Refills: 2 | Status: COMPLETED | COMMUNITY
Start: 2022-10-29 | End: 2023-04-13

## 2023-04-20 ENCOUNTER — APPOINTMENT (OUTPATIENT)
Dept: OBGYN | Facility: CLINIC | Age: 53
End: 2023-04-20
Payer: COMMERCIAL

## 2023-04-20 VITALS
DIASTOLIC BLOOD PRESSURE: 73 MMHG | BODY MASS INDEX: 39.05 KG/M2 | SYSTOLIC BLOOD PRESSURE: 109 MMHG | WEIGHT: 243 LBS | HEIGHT: 66 IN

## 2023-04-20 PROCEDURE — 99024 POSTOP FOLLOW-UP VISIT: CPT

## 2023-04-24 NOTE — HISTORY OF PRESENT ILLNESS
[Pain is well-controlled] : pain is well-controlled [Fever] : no fever [Chills] : no chills [Nausea] : no nausea [Vomiting] : no vomiting [Clean/Dry/Intact] : clean, dry and intact [Erythema] : not erythematous [None] : no vaginal bleeding [Normal] : normal [Pathology reviewed] : pathology reviewed [de-identified] : path benign serous cystadenoma

## 2023-04-25 ENCOUNTER — NON-APPOINTMENT (OUTPATIENT)
Age: 53
End: 2023-04-25

## 2023-05-04 ENCOUNTER — APPOINTMENT (OUTPATIENT)
Dept: OBGYN | Facility: CLINIC | Age: 53
End: 2023-05-04
Payer: COMMERCIAL

## 2023-05-04 VITALS
DIASTOLIC BLOOD PRESSURE: 82 MMHG | HEIGHT: 66 IN | HEART RATE: 89 BPM | SYSTOLIC BLOOD PRESSURE: 128 MMHG | BODY MASS INDEX: 39.05 KG/M2 | WEIGHT: 243 LBS

## 2023-05-04 PROCEDURE — 99212 OFFICE O/P EST SF 10 MIN: CPT

## 2023-05-08 NOTE — HISTORY OF PRESENT ILLNESS
[Pain is well-controlled] : pain is well-controlled [Fever] : no fever [Chills] : no chills [Nausea] : no nausea [Vomiting] : no vomiting [Clean/Dry/Intact] : clean, dry and intact [Erythema] : not erythematous [None] : no vaginal bleeding [Normal] : normal [Pathology reviewed] : pathology reviewed [de-identified] : Doing well for suture removal

## 2023-05-09 NOTE — ED ADULT TRIAGE NOTE - AS HEIGHT TYPE
Schedule colonoscopy, we will call to schedule.     Take your propranolol the morning of the procedure.  Hold your hydrochlorothiazide the morning of the procedure.    Hold your losartan for 24 hours prior to the procedure.   Metformin: On prep day, take your usual morning dose. Skip your lunch or dinner dose. On procedure day, do not take the morning dose. You can resume your usual lunch or dinner dose if you are eating postprocedure.  Ozempic: Adjust the schedule so weekly injection is not on a prep day or procedure day.    Hold all herbal supplements and multivitamins including iron supplements for 7 days before procedure. Hold all NSAIDs/Aspirin 3 days before procedure. Can take all other medications with a sip of water the morning of the procedure.    Follow up in 3-4 months or sooner if needed.    Please call the office with questions or concerns, [phone] or [phone].       stated

## 2023-05-19 DIAGNOSIS — R39.9 UNSPECIFIED SYMPTOMS AND SIGNS INVOLVING THE GENITOURINARY SYSTEM: ICD-10-CM

## 2023-05-27 ENCOUNTER — APPOINTMENT (OUTPATIENT)
Dept: OBGYN | Facility: CLINIC | Age: 53
End: 2023-05-27
Payer: COMMERCIAL

## 2023-05-27 PROCEDURE — 99212 OFFICE O/P EST SF 10 MIN: CPT

## 2023-05-31 LAB — BACTERIA UR CULT: NORMAL

## 2023-06-01 ENCOUNTER — APPOINTMENT (OUTPATIENT)
Dept: OBGYN | Facility: CLINIC | Age: 53
End: 2023-06-01

## 2023-07-10 NOTE — ED PROVIDER NOTE - CHIEF COMPLAINT
What Is The Reason For Today's Visit?: Full Body Skin Examination
The patient is a 47y Female complaining of abdominal pain.
What Is The Reason For Today's Visit? (Being Monitored For X): concerning skin lesions on a periodic basis

## 2023-07-17 ENCOUNTER — NON-APPOINTMENT (OUTPATIENT)
Age: 53
End: 2023-07-17

## 2023-07-21 ENCOUNTER — APPOINTMENT (OUTPATIENT)
Dept: OBGYN | Facility: CLINIC | Age: 53
End: 2023-07-21
Payer: COMMERCIAL

## 2023-07-21 PROCEDURE — 81002 URINALYSIS NONAUTO W/O SCOPE: CPT

## 2023-08-18 ENCOUNTER — APPOINTMENT (OUTPATIENT)
Dept: ORTHOPEDIC SURGERY | Facility: CLINIC | Age: 53
End: 2023-08-18

## 2023-08-28 NOTE — H&P PST ADULT - REASON FOR ADMISSION
C3 nurse spoke with Ashley B Naegele  for a TCC post hospital discharge follow up call. The patient has a scheduled HOS appointment with Josh Espana MD  9/5/23 @ 1:45 pm          "I have ovarian cyst"

## 2023-09-10 ENCOUNTER — EMERGENCY (EMERGENCY)
Facility: HOSPITAL | Age: 53
LOS: 1 days | Discharge: HOME | End: 2023-09-10
Attending: EMERGENCY MEDICINE
Payer: COMMERCIAL

## 2023-09-10 VITALS
HEART RATE: 110 BPM | DIASTOLIC BLOOD PRESSURE: 81 MMHG | OXYGEN SATURATION: 95 % | WEIGHT: 248.02 LBS | RESPIRATION RATE: 20 BRPM | SYSTOLIC BLOOD PRESSURE: 117 MMHG | TEMPERATURE: 100 F

## 2023-09-10 DIAGNOSIS — Z98.89 OTHER SPECIFIED POSTPROCEDURAL STATES: Chronic | ICD-10-CM

## 2023-09-10 DIAGNOSIS — I82.90 ACUTE EMBOLISM AND THROMBOSIS OF UNSPECIFIED VEIN: Chronic | ICD-10-CM

## 2023-09-10 DIAGNOSIS — Z86.39 PERSONAL HISTORY OF OTHER ENDOCRINE, NUTRITIONAL AND METABOLIC DISEASE: Chronic | ICD-10-CM

## 2023-09-10 LAB
ALBUMIN SERPL ELPH-MCNC: 4.1 G/DL — SIGNIFICANT CHANGE UP (ref 3.3–5)
ALP SERPL-CCNC: 108 U/L — SIGNIFICANT CHANGE UP (ref 40–120)
ALT FLD-CCNC: 17 U/L — SIGNIFICANT CHANGE UP (ref 10–45)
ANION GAP SERPL CALC-SCNC: 11 MMOL/L — SIGNIFICANT CHANGE UP (ref 5–17)
APPEARANCE UR: CLEAR — SIGNIFICANT CHANGE UP
AST SERPL-CCNC: 18 U/L — SIGNIFICANT CHANGE UP (ref 10–40)
BASOPHILS # BLD AUTO: 0.02 K/UL — SIGNIFICANT CHANGE UP (ref 0–0.2)
BASOPHILS NFR BLD AUTO: 0.3 % — SIGNIFICANT CHANGE UP (ref 0–2)
BILIRUB SERPL-MCNC: 0.3 MG/DL — SIGNIFICANT CHANGE UP (ref 0.2–1.2)
BILIRUB UR-MCNC: NEGATIVE — SIGNIFICANT CHANGE UP
BUN SERPL-MCNC: 14 MG/DL — SIGNIFICANT CHANGE UP (ref 7–23)
CALCIUM SERPL-MCNC: 9.1 MG/DL — SIGNIFICANT CHANGE UP (ref 8.4–10.5)
CHLORIDE SERPL-SCNC: 104 MMOL/L — SIGNIFICANT CHANGE UP (ref 96–108)
CO2 SERPL-SCNC: 22 MMOL/L — SIGNIFICANT CHANGE UP (ref 22–31)
COLOR SPEC: SIGNIFICANT CHANGE UP
CREAT SERPL-MCNC: 0.83 MG/DL — SIGNIFICANT CHANGE UP (ref 0.5–1.3)
DIFF PNL FLD: NEGATIVE — SIGNIFICANT CHANGE UP
EGFR: 85 ML/MIN/1.73M2 — SIGNIFICANT CHANGE UP
EOSINOPHIL # BLD AUTO: 0.01 K/UL — SIGNIFICANT CHANGE UP (ref 0–0.5)
EOSINOPHIL NFR BLD AUTO: 0.1 % — SIGNIFICANT CHANGE UP (ref 0–6)
GLUCOSE SERPL-MCNC: 107 MG/DL — HIGH (ref 70–99)
GLUCOSE UR QL: NEGATIVE — SIGNIFICANT CHANGE UP
HCT VFR BLD CALC: 36.9 % — SIGNIFICANT CHANGE UP (ref 34.5–45)
HGB BLD-MCNC: 11.5 G/DL — SIGNIFICANT CHANGE UP (ref 11.5–15.5)
IMM GRANULOCYTES NFR BLD AUTO: 0.4 % — SIGNIFICANT CHANGE UP (ref 0–0.9)
KETONES UR-MCNC: NEGATIVE — SIGNIFICANT CHANGE UP
LEUKOCYTE ESTERASE UR-ACNC: NEGATIVE — SIGNIFICANT CHANGE UP
LYMPHOCYTES # BLD AUTO: 0.54 K/UL — LOW (ref 1–3.3)
LYMPHOCYTES # BLD AUTO: 8 % — LOW (ref 13–44)
MAGNESIUM SERPL-MCNC: 1.9 MG/DL — SIGNIFICANT CHANGE UP (ref 1.6–2.6)
MCHC RBC-ENTMCNC: 25.4 PG — LOW (ref 27–34)
MCHC RBC-ENTMCNC: 31.2 GM/DL — LOW (ref 32–36)
MCV RBC AUTO: 81.6 FL — SIGNIFICANT CHANGE UP (ref 80–100)
MONOCYTES # BLD AUTO: 0.55 K/UL — SIGNIFICANT CHANGE UP (ref 0–0.9)
MONOCYTES NFR BLD AUTO: 8.1 % — SIGNIFICANT CHANGE UP (ref 2–14)
NEUTROPHILS # BLD AUTO: 5.61 K/UL — SIGNIFICANT CHANGE UP (ref 1.8–7.4)
NEUTROPHILS NFR BLD AUTO: 83.1 % — HIGH (ref 43–77)
NITRITE UR-MCNC: NEGATIVE — SIGNIFICANT CHANGE UP
NRBC # BLD: 0 /100 WBCS — SIGNIFICANT CHANGE UP (ref 0–0)
PH UR: 5 — SIGNIFICANT CHANGE UP (ref 5–8)
PLATELET # BLD AUTO: 182 K/UL — SIGNIFICANT CHANGE UP (ref 150–400)
POTASSIUM SERPL-MCNC: 4.1 MMOL/L — SIGNIFICANT CHANGE UP (ref 3.5–5.3)
POTASSIUM SERPL-SCNC: 4.1 MMOL/L — SIGNIFICANT CHANGE UP (ref 3.5–5.3)
PROT SERPL-MCNC: 7 G/DL — SIGNIFICANT CHANGE UP (ref 6–8.3)
PROT UR-MCNC: NEGATIVE — SIGNIFICANT CHANGE UP
RBC # BLD: 4.52 M/UL — SIGNIFICANT CHANGE UP (ref 3.8–5.2)
RBC # FLD: 15.6 % — HIGH (ref 10.3–14.5)
SODIUM SERPL-SCNC: 137 MMOL/L — SIGNIFICANT CHANGE UP (ref 135–145)
SP GR SPEC: 1.01 — LOW (ref 1.01–1.02)
TROPONIN T, HIGH SENSITIVITY RESULT: 7 NG/L — SIGNIFICANT CHANGE UP (ref 0–51)
UROBILINOGEN FLD QL: NEGATIVE — SIGNIFICANT CHANGE UP
WBC # BLD: 6.76 K/UL — SIGNIFICANT CHANGE UP (ref 3.8–10.5)
WBC # FLD AUTO: 6.76 K/UL — SIGNIFICANT CHANGE UP (ref 3.8–10.5)

## 2023-09-10 PROCEDURE — 71046 X-RAY EXAM CHEST 2 VIEWS: CPT | Mod: 26

## 2023-09-10 PROCEDURE — 99284 EMERGENCY DEPT VISIT MOD MDM: CPT

## 2023-09-10 RX ORDER — SODIUM CHLORIDE 9 MG/ML
1000 INJECTION INTRAMUSCULAR; INTRAVENOUS; SUBCUTANEOUS ONCE
Refills: 0 | Status: COMPLETED | OUTPATIENT
Start: 2023-09-10 | End: 2023-09-10

## 2023-09-10 RX ORDER — ACETAMINOPHEN 500 MG
1000 TABLET ORAL ONCE
Refills: 0 | Status: COMPLETED | OUTPATIENT
Start: 2023-09-10 | End: 2023-09-10

## 2023-09-10 RX ADMIN — Medication 400 MILLIGRAM(S): at 22:16

## 2023-09-10 RX ADMIN — SODIUM CHLORIDE 1000 MILLILITER(S): 9 INJECTION INTRAMUSCULAR; INTRAVENOUS; SUBCUTANEOUS at 22:16

## 2023-09-10 NOTE — ED PROVIDER NOTE - PATIENT PORTAL LINK FT
You can access the FollowMyHealth Patient Portal offered by [facility] by registering at the following website: [email or web address]. By joining [facility]’s FollowMyHealth portal, you will also be able to view your health information using other applications (apps) compatible with our system.

## 2023-09-10 NOTE — ED ADULT NURSE NOTE - OBJECTIVE STATEMENT
53y F PMH Crohn's presents to the ED c/o cough, fevers/chills. Pt reports "chest heaviness", cough, fevers, chills x2 days. Pt received dose of Remicade yesterday. Pt was seen at Urgent care  earlier today with -Covid result. Pt denies cp, sob, nvd, abd pain, urinary symptoms. Pt reports achy chest tightness when she is coughing trying to get the phelgm up. VS documented. Comfort and safety maintained.

## 2023-09-10 NOTE — ED PROVIDER NOTE - ADDITIONAL NOTES AND INSTRUCTIONS:
To Whom it May Concern - Patient seen and evaluated in Emergency Room. Please excuse the above individual for medical care and recovery until date above. Thank you for you care. - Bia Wiggins MD.

## 2023-09-10 NOTE — ED PROVIDER NOTE - OBJECTIVE STATEMENT
53-year-old female with PMH fibromyalgia, Crohn's on Remicade presents to emergency department for 2 days of URI symptoms and 1 day of fever.  Yesterday patient developed a little cough, otherwise felt well so got Remicade as usual.  Today he developed fever, body aches, worsening upper airway congestion and felt concerned, went to urgent care where they did a negative COVID swab and came to emergency room because she was anxious given that she received Remicade.  Patient is denying acute shortness of breath except when she is coughing/congestion, denies abdominal pain nausea/vomiting.  Decreased appetite.  Some achy chest pain/tightness when she is coughing and trying to get thick phlegm out.

## 2023-09-10 NOTE — ED PROVIDER NOTE - PHYSICAL EXAMINATION
CONSTITUTIONAL: NAD  SKIN: Warm dry  HEAD: NCAT  EYES: NL inspection  ENT: MMM  NECK: Supple; non tender.  CARD: RRR  RESP: CTAB, dry cough, no wheezing/crackles   ABD: S/NT no R/G  EXT: no edema  NEURO: Grossly non-focal   PSYCH: Cooperative, appropriate.

## 2023-09-10 NOTE — ED PROVIDER NOTE - CLINICAL SUMMARY MEDICAL DECISION MAKING FREE TEXT BOX
Feliciano PGY3: 53-year-old female with PMH fibromyalgia, Crohn's on Remicade presents to emergency department for 2 days of URI symptoms and 1 day of fever, otherwise well appearing w/o hypoxia. Differential Diagnosis includes but not limited to viral uri vs pna. Check labs, CXR, IVF, tylenol.

## 2023-09-10 NOTE — ED PROVIDER NOTE - NSFOLLOWUPINSTRUCTIONS_ED_ALL_ED_FT
Viral Upper Respiratory Infection    For BODY ACHES & FEVER  You can take Tylenol 1000mg every 6-8 hours  Avoid dehydration, take gatorade or pedialyte (electrolyte rich solutions).   Eat as tolerated    For COUGH/SORE THROAT:  Can trial over-the-counter decongestants and cough suppressants, as well as remedies like tea and salt-water mouth swishes.    If you have worsening symptoms - worsening chest pain, difficulty breathing, inability to tolerate any oral intake even water - seek immediate medical evaluation.    Rest, drink plenty of fluids.  Advance activity as tolerated.  Continue all previously prescribed medications as directed.  Follow up with your primary care physician in 48-72 hours- bring copies of your results.  Return to the ER for worsening or persistent symptoms, and/or ANY NEW OR CONCERNING SYMPTOMS. Viral Upper Respiratory Infection - Influenza.    For BODY ACHES & FEVER  You can take Tylenol 1000mg every 6-8 hours  Continue TAMIFLU twice a day for 5 days,  Avoid dehydration, take gatorade or pedialyte (electrolyte rich solutions).   Eat as tolerated    For COUGH/SORE THROAT:  Can trial over-the-counter decongestants and cough suppressants, as well as remedies like tea and salt-water mouth swishes.    If you have worsening symptoms - worsening chest pain, difficulty breathing, inability to tolerate any oral intake even water - seek immediate medical evaluation.    Rest, drink plenty of fluids.  Advance activity as tolerated.  Continue all previously prescribed medications as directed.  Follow up with your primary care physician in 48-72 hours- bring copies of your results.  Return to the ER for worsening or persistent symptoms, and/or ANY NEW OR CONCERNING SYMPTOMS.

## 2023-09-10 NOTE — ED ADULT NURSE NOTE - DISCHARGE DATE/TIME
CC:  Vincent Pepe   Chief Complaint   Patient presents with   • Office Visit     nail care  Dr Vanegas 09/28/2021     Referring MD: Nicola Parker DPM  PCP: Amirah Vanegas MD  Medications: medications verified and updated  Refills needed today?  NO  denies known Latex allergy or symptoms of Latex sensitivity.  Patient would like communication of their results via:      Cell Phone:   Telephone Information:   Mobile [phone]     Okay to leave a message containing results? Yes  Tobacco history: verified                11-Sep-2023 00:50

## 2023-09-11 VITALS
DIASTOLIC BLOOD PRESSURE: 68 MMHG | HEART RATE: 95 BPM | TEMPERATURE: 100 F | SYSTOLIC BLOOD PRESSURE: 145 MMHG | OXYGEN SATURATION: 99 % | RESPIRATION RATE: 16 BRPM

## 2023-09-11 LAB
FLUAV H1 2009 PAND RNA SPEC QL NAA+PROBE: DETECTED
RAPID RVP RESULT: DETECTED
SARS-COV-2 RNA SPEC QL NAA+PROBE: SIGNIFICANT CHANGE UP

## 2023-09-11 PROCEDURE — 99285 EMERGENCY DEPT VISIT HI MDM: CPT | Mod: 25

## 2023-09-11 PROCEDURE — 96374 THER/PROPH/DIAG INJ IV PUSH: CPT

## 2023-09-11 PROCEDURE — 85025 COMPLETE CBC W/AUTO DIFF WBC: CPT

## 2023-09-11 PROCEDURE — 71046 X-RAY EXAM CHEST 2 VIEWS: CPT

## 2023-09-11 PROCEDURE — 83735 ASSAY OF MAGNESIUM: CPT

## 2023-09-11 PROCEDURE — 0225U NFCT DS DNA&RNA 21 SARSCOV2: CPT

## 2023-09-11 PROCEDURE — 81003 URINALYSIS AUTO W/O SCOPE: CPT

## 2023-09-11 PROCEDURE — 93005 ELECTROCARDIOGRAM TRACING: CPT

## 2023-09-11 PROCEDURE — 80053 COMPREHEN METABOLIC PANEL: CPT

## 2023-09-11 PROCEDURE — 84484 ASSAY OF TROPONIN QUANT: CPT

## 2023-09-11 PROCEDURE — 96375 TX/PRO/DX INJ NEW DRUG ADDON: CPT

## 2023-09-11 RX ORDER — KETOROLAC TROMETHAMINE 30 MG/ML
15 SYRINGE (ML) INJECTION ONCE
Refills: 0 | Status: DISCONTINUED | OUTPATIENT
Start: 2023-09-11 | End: 2023-09-11

## 2023-09-11 RX ORDER — ONDANSETRON 8 MG/1
1 TABLET, FILM COATED ORAL
Qty: 15 | Refills: 0
Start: 2023-09-11 | End: 2023-09-15

## 2023-09-11 RX ADMIN — Medication 75 MILLIGRAM(S): at 00:23

## 2023-09-11 RX ADMIN — Medication 15 MILLIGRAM(S): at 00:42

## 2023-09-12 ENCOUNTER — APPOINTMENT (OUTPATIENT)
Dept: ENDOCRINOLOGY | Facility: CLINIC | Age: 53
End: 2023-09-12

## 2023-09-14 ENCOUNTER — APPOINTMENT (OUTPATIENT)
Dept: ENDOCRINOLOGY | Facility: CLINIC | Age: 53
End: 2023-09-14

## 2023-10-02 ENCOUNTER — NON-APPOINTMENT (OUTPATIENT)
Age: 53
End: 2023-10-02

## 2023-10-02 ENCOUNTER — APPOINTMENT (OUTPATIENT)
Dept: CARDIOLOGY | Facility: CLINIC | Age: 53
End: 2023-10-02
Payer: COMMERCIAL

## 2023-10-02 VITALS — WEIGHT: 248 LBS | BODY MASS INDEX: 39.86 KG/M2 | HEART RATE: 72 BPM | OXYGEN SATURATION: 97 % | HEIGHT: 66 IN

## 2023-10-02 PROCEDURE — 99214 OFFICE O/P EST MOD 30 MIN: CPT | Mod: 25

## 2023-10-02 PROCEDURE — 93000 ELECTROCARDIOGRAM COMPLETE: CPT

## 2023-10-14 ENCOUNTER — LABORATORY RESULT (OUTPATIENT)
Age: 53
End: 2023-10-14

## 2023-11-30 ENCOUNTER — APPOINTMENT (OUTPATIENT)
Dept: PODIATRY | Facility: CLINIC | Age: 53
End: 2023-11-30

## 2023-12-28 ENCOUNTER — APPOINTMENT (OUTPATIENT)
Dept: ULTRASOUND IMAGING | Facility: IMAGING CENTER | Age: 53
End: 2023-12-28
Payer: COMMERCIAL

## 2023-12-28 ENCOUNTER — OUTPATIENT (OUTPATIENT)
Dept: OUTPATIENT SERVICES | Facility: HOSPITAL | Age: 53
LOS: 1 days | End: 2023-12-28
Payer: COMMERCIAL

## 2023-12-28 ENCOUNTER — APPOINTMENT (OUTPATIENT)
Dept: OBGYN | Facility: CLINIC | Age: 53
End: 2023-12-28
Payer: COMMERCIAL

## 2023-12-28 DIAGNOSIS — Z86.39 PERSONAL HISTORY OF OTHER ENDOCRINE, NUTRITIONAL AND METABOLIC DISEASE: Chronic | ICD-10-CM

## 2023-12-28 DIAGNOSIS — R60.0 LOCALIZED EDEMA: ICD-10-CM

## 2023-12-28 DIAGNOSIS — Z98.89 OTHER SPECIFIED POSTPROCEDURAL STATES: Chronic | ICD-10-CM

## 2023-12-28 DIAGNOSIS — I82.90 ACUTE EMBOLISM AND THROMBOSIS OF UNSPECIFIED VEIN: Chronic | ICD-10-CM

## 2023-12-28 PROCEDURE — 93971 EXTREMITY STUDY: CPT | Mod: 26,59,LT

## 2023-12-28 PROCEDURE — 93971 EXTREMITY STUDY: CPT

## 2023-12-28 PROCEDURE — 99396 PREV VISIT EST AGE 40-64: CPT

## 2024-01-04 ENCOUNTER — APPOINTMENT (OUTPATIENT)
Dept: VASCULAR SURGERY | Facility: CLINIC | Age: 54
End: 2024-01-04
Payer: COMMERCIAL

## 2024-01-04 VITALS
DIASTOLIC BLOOD PRESSURE: 67 MMHG | BODY MASS INDEX: 40.82 KG/M2 | TEMPERATURE: 98.8 F | SYSTOLIC BLOOD PRESSURE: 104 MMHG | HEIGHT: 65 IN | HEART RATE: 86 BPM | WEIGHT: 245 LBS

## 2024-01-04 DIAGNOSIS — M79.89 OTHER SPECIFIED SOFT TISSUE DISORDERS: ICD-10-CM

## 2024-01-04 DIAGNOSIS — S45.109A: ICD-10-CM

## 2024-01-04 PROCEDURE — 99203 OFFICE O/P NEW LOW 30 MIN: CPT

## 2024-04-16 ENCOUNTER — APPOINTMENT (OUTPATIENT)
Dept: CARDIOLOGY | Facility: CLINIC | Age: 54
End: 2024-04-16
Payer: COMMERCIAL

## 2024-04-16 ENCOUNTER — NON-APPOINTMENT (OUTPATIENT)
Age: 54
End: 2024-04-16

## 2024-04-16 VITALS
WEIGHT: 247 LBS | HEIGHT: 65 IN | HEART RATE: 71 BPM | BODY MASS INDEX: 41.15 KG/M2 | TEMPERATURE: 98 F | OXYGEN SATURATION: 98 % | DIASTOLIC BLOOD PRESSURE: 79 MMHG | SYSTOLIC BLOOD PRESSURE: 126 MMHG

## 2024-04-16 DIAGNOSIS — I34.0 NONRHEUMATIC MITRAL (VALVE) INSUFFICIENCY: ICD-10-CM

## 2024-04-16 DIAGNOSIS — R06.09 OTHER FORMS OF DYSPNEA: ICD-10-CM

## 2024-04-16 DIAGNOSIS — E78.5 HYPERLIPIDEMIA, UNSPECIFIED: ICD-10-CM

## 2024-04-16 DIAGNOSIS — R60.0 LOCALIZED EDEMA: ICD-10-CM

## 2024-04-16 DIAGNOSIS — K52.9 NONINFECTIVE GASTROENTERITIS AND COLITIS, UNSPECIFIED: ICD-10-CM

## 2024-04-16 DIAGNOSIS — R00.2 PALPITATIONS: ICD-10-CM

## 2024-04-16 PROCEDURE — 99214 OFFICE O/P EST MOD 30 MIN: CPT

## 2024-04-16 PROCEDURE — G2211 COMPLEX E/M VISIT ADD ON: CPT

## 2024-04-16 PROCEDURE — 93000 ELECTROCARDIOGRAM COMPLETE: CPT

## 2024-04-16 NOTE — REASON FOR VISIT
[CV Risk Factors and Non-Cardiac Disease] : CV risk factors and non-cardiac disease [Follow-Up - Clinic] : a clinic follow-up of [Dizziness] : dizziness [Dyspnea] : dyspnea [Mitral Regurgitation] : mitral regurgitation [Palpitations] : palpitations [FreeTextEntry1] : crohn's dz, s/p removal of pituitary tumor complicated by brachial artery dissection, murmur

## 2024-04-16 NOTE — PHYSICAL EXAM
[General Appearance - Well Developed] : well developed [Normal Appearance] : normal appearance [Well Groomed] : well groomed [General Appearance - Well Nourished] : well nourished [No Deformities] : no deformities [General Appearance - In No Acute Distress] : no acute distress [Eyelids - No Xanthelasma] : the eyelids demonstrated no xanthelasmas [Normal Oral Mucosa] : normal oral mucosa [No Oral Pallor] : no oral pallor [No Oral Cyanosis] : no oral cyanosis [Normal Oropharynx] : normal oropharynx [Normal Jugular Venous A Waves Present] : normal jugular venous A waves present [Normal Jugular Venous V Waves Present] : normal jugular venous V waves present [No Jugular Venous Rodas A Waves] : no jugular venous rodas A waves [Respiration, Rhythm And Depth] : normal respiratory rhythm and effort [Exaggerated Use Of Accessory Muscles For Inspiration] : no accessory muscle use [Auscultation Breath Sounds / Voice Sounds] : lungs were clear to auscultation bilaterally [Bowel Sounds] : normal bowel sounds [Abdomen Soft] : soft [Abdomen Tenderness] : non-tender [Abnormal Walk] : normal gait [Gait - Sufficient For Exercise Testing] : the gait was sufficient for exercise testing [Nail Clubbing] : no clubbing of the fingernails [Cyanosis, Localized] : no localized cyanosis [Petechial Hemorrhages (___cm)] : no petechial hemorrhages [Skin Color & Pigmentation] : normal skin color and pigmentation [Skin Turgor] : normal skin turgor [] : no rash [No Venous Stasis] : no venous stasis [Skin Lesions] : no skin lesions [No Skin Ulcers] : no skin ulcer [No Xanthoma] : no  xanthoma was observed [Oriented To Time, Place, And Person] : oriented to person, place, and time [Impaired Insight] : insight and judgment were intact [Affect] : the affect was normal [Mood] : the mood was normal [No Anxiety] : not feeling anxious [I] : a grade 1 [___ +] : bilateral [unfilled]U+ pitting edema to the ankles [Well Developed] : well developed [Well Nourished] : well nourished [No Acute Distress] : no acute distress [Normal Conjunctiva] : normal conjunctiva [Normal Venous Pressure] : normal venous pressure [No Carotid Bruit] : no carotid bruit [Normal S1, S2] : normal S1, S2 [No Rub] : no rub [5th Left ICS - MCL] : palpated at the 5th LICS in the midclavicular line [Normal] : normal [No Precordial Heave] : no precordial heave was noted [Normal Rate] : normal [Rhythm Regular] : regular [Normal S1] : normal S1 [Normal S2] : normal S2 [No Gallop] : no gallop heard [II] : a grade 2 [No Pitting Edema] : no pitting edema present [2+] : left 2+ [No Abnormalities] : the abdominal aorta was not enlarged and no bruit was heard [Clear Lung Fields] : clear lung fields [Good Air Entry] : good air entry [No Respiratory Distress] : no respiratory distress  [Soft] : abdomen soft [Non Tender] : non-tender [No Masses/organomegaly] : no masses/organomegaly [Normal Bowel Sounds] : normal bowel sounds [Normal Gait] : normal gait [No Edema] : no edema [No Cyanosis] : no cyanosis [No Clubbing] : no clubbing [No Varicosities] : no varicosities [No Rash] : no rash [No Skin Lesions] : no skin lesions [Moves all extremities] : moves all extremities [No Focal Deficits] : no focal deficits [Normal Speech] : normal speech [Alert and Oriented] : alert and oriented [Normal memory] : normal memory [Apical Thrill] : no thrill palpable at the apex [S3] : no S3 [S4] : no S4 [Click] : no click [Distant] : the heart sounds were ~L not distant [Pericardial Rub] : no pericardial rub [Rt] : no varicose veins of the right leg [Lt] : no varicose veins of the left leg [Right Carotid Bruit] : no bruit heard over the right carotid [Left Carotid Bruit] : no bruit heard over the left carotid [Right Femoral Bruit] : no bruit heard over the right femoral artery [Left Femoral Bruit] : no bruit heard over the left femoral artery

## 2024-04-16 NOTE — ASSESSMENT
[FreeTextEntry1] : Prior note nurse practitioner Gregorio October 5, 2020::  This is a 50 year year old female here today for follow up cardiac followup evaluation.  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  Today she is feeling generally well  but does complain of dizziness and feeling faint when she has a flare up of her UC. Approximately 2 weeks ago she was in the ER at [facility] for feeling faint and was going to pass out. She received IVF and was sent home. She states that when this occurs she feels dizzy and gets a headache. She admits that she does not drink enough fluids because she is afraid that she will have to use the bathroom. I have asked her to follow up with her GI doctor Dr. Villareal. She stated that she was told to follow up with cardiology to r/o any vasovagal episodes.   -BP is well controlled in today's visit. She is not orthostatic at this time. Her last episode of dizziness/feeling faint was when she had a BM yesterday afternoon. She states that she has not had a severe episode since her visit to the ER.  -New blood work was done today to evaluate lipid profile, CBC, BMP, hepatic function, A1C and TSH.   -EKG done today which demonstrated regular sinus rhythm with nonspecific ST-T wave changes, BPM of 70. -We will order a 24 hour Holter monitor to evaluate for vasovagal events -We will order a 2 week ZIO patch to assess when these episodes occur during a BM. -She will follow up with her GI Dr. Villareal. -I have also asked the patient to follow up with her neurologist Dr. Mata which she states she has an appointment in the beginning of November. -The patient will schedule an Exercise Stress Test rule out significant coronary artery disease.  I have discussed the plan of care with Ms. SADIA MARTINEZ  and she  will follow up in 1 week. She is compliant with all of her  medications.  The patient understands that aerobic exercises must be increased to minutes 4 times/week and a detailed discussion of lifestyle modification was done today.  The patient has a good understanding of the diagnosis, treatment plan and lifestyle modification.  She will contact me at the office for any questions with their care or any changes in their health status.  The plan of care was discussed with supervising physician, Dr. Sevilla while present in the office at the time of the visit.   Radha KELSEY

## 2024-04-16 NOTE — DISCUSSION/SUMMARY
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 She denies palpitations, shortness of breath, dizziness or syncope.  The patient does complain of dyspnea on exertion.  She feels that she is unable to walk for distance without shortness of breath.  She feels short of breath walking up hills.  She is limited in her exercise capacity and part of her symptoms may represent reduced aerobic capacity. However father had cardiac event in his 50s, and she is concerned about early atherosclerotic heart disease. Given the limitations and symptoms, I recommended she schedule coronary CTA to rule out significant coronary artery disease.  She will follow-up with me after that is completed. She also complains of bilateral pedal edema which may represent dependent edema secondary to venous insufficiency.  She also has some edema in her left wrist which is the only arm that is being used for her infusions for inflammatory bowel disease.  She was recently seen by Dr. Alfaro of vascular surgery who recommended a compression sleeve and elevation of her hand. Electrocardiogram done April 12, 2023 demonstrated normal sinus rhythm rate 78 bpm is otherwise unremarkable. Blood work done March 9, 2023 demonstrated cholesterol 202, HDL 45, triglycerides 127, LDL calculated 131, non-HDL cholesterol 157 mg/dL with hemoglobin A1c of 6.1.  Her vitamin D was low at that time of 6.8. She is currently hemodynamically stable and asymptomatic from cardiac standpoint. The patient receives Remicade infusions.  She had recent blood work done by her endocrinologist, and then subsequent blood work done by her gastroenterologist.  The subsequent blood work demonstrated a low calcium level of 7.1.  This needs to be repeated and we will do so this week.  Electrocardiogram done July 12, 2022 demonstrated normal sinus rhythm rate 78 bpm is otherwise unremarkable. Blood work done April 30, 2022 demonstrated hemoglobin A1c of 6.0, potassium of 4.5, cholesterol 205, HDL of 52, triglycerides 137, LDL calculated 126, non-HDL cholesterol 154 mg/dL. She will follow-up with her endocrinologist.  I feel that she is a good candidate for GLP-1 agonist therapy for better control of her prediabetes, and potential weight loss. The patient had a normal exercise stress test October 12, 2020. Electrocardiogram done August 26, 2020 demonstrates normal sinus rhythm rate 72 bpm is otherwise unremarkable.  PMH: The patient has active Crohn's disease and will be getting another infusion of Remicade this week.  I have asked her to increase her hydration specifically to have 1 bottle of Pedialyte per day for the next week.   The patient has been complaining of occasional dyspnea on exertion.  She was at a sweet 16 party, March 2020,  and started to dance, felt shortness of breath with limited exercise.  The patient understands that aerobic exercises must be increased to 40 minutes 4 times per week. A detailed discussion of lifestyle modification was done today. The patient has a good understanding of the diagnosis, and treatment plan. Lifestyle modification was also outlined. A detailed discussion of lifestyle modification was done today. The patient has a good understanding of the diagnosis, and treatment plan. Lifestyle modification was also outlined.

## 2024-04-17 PROBLEM — R06.09 DYSPNEA ON EXERTION: Status: ACTIVE | Noted: 2018-05-14

## 2024-04-17 RX ORDER — SULFAMETHOXAZOLE AND TRIMETHOPRIM 800; 160 MG/1; MG/1
800-160 TABLET ORAL TWICE DAILY
Qty: 6 | Refills: 0 | Status: DISCONTINUED | COMMUNITY
Start: 2023-04-03 | End: 2024-04-17

## 2024-04-25 ENCOUNTER — APPOINTMENT (OUTPATIENT)
Dept: OBGYN | Facility: CLINIC | Age: 54
End: 2024-04-25
Payer: COMMERCIAL

## 2024-04-25 PROCEDURE — 76856 US EXAM PELVIC COMPLETE: CPT | Mod: 59

## 2024-04-25 PROCEDURE — 76830 TRANSVAGINAL US NON-OB: CPT

## 2024-05-20 RX ORDER — PREDNISONE 50 MG/1
50 TABLET ORAL
Qty: 3 | Refills: 0 | Status: ACTIVE | COMMUNITY
Start: 2024-05-20 | End: 1900-01-01

## 2024-05-22 ENCOUNTER — NON-APPOINTMENT (OUTPATIENT)
Age: 54
End: 2024-05-22

## 2024-05-23 ENCOUNTER — APPOINTMENT (OUTPATIENT)
Dept: CT IMAGING | Facility: CLINIC | Age: 54
End: 2024-05-23
Payer: COMMERCIAL

## 2024-05-23 ENCOUNTER — OUTPATIENT (OUTPATIENT)
Dept: OUTPATIENT SERVICES | Facility: HOSPITAL | Age: 54
LOS: 1 days | End: 2024-05-23
Payer: COMMERCIAL

## 2024-05-23 DIAGNOSIS — Z98.89 OTHER SPECIFIED POSTPROCEDURAL STATES: Chronic | ICD-10-CM

## 2024-05-23 DIAGNOSIS — R06.09 OTHER FORMS OF DYSPNEA: ICD-10-CM

## 2024-05-23 DIAGNOSIS — Z86.39 PERSONAL HISTORY OF OTHER ENDOCRINE, NUTRITIONAL AND METABOLIC DISEASE: Chronic | ICD-10-CM

## 2024-05-23 DIAGNOSIS — E78.5 HYPERLIPIDEMIA, UNSPECIFIED: ICD-10-CM

## 2024-05-23 DIAGNOSIS — I82.90 ACUTE EMBOLISM AND THROMBOSIS OF UNSPECIFIED VEIN: Chronic | ICD-10-CM

## 2024-05-23 PROCEDURE — 75574 CT ANGIO HRT W/3D IMAGE: CPT

## 2024-05-23 PROCEDURE — 75574 CT ANGIO HRT W/3D IMAGE: CPT | Mod: 26

## 2024-06-13 NOTE — ED ADULT TRIAGE NOTE - WEIGHT IN LBS
Take Tylenol or ibuprofen as needed for pain.  Take Reglan as needed for nausea.    Follow-up with your primary doctor in 3 to 5 days for any other concerns.  Look on your MyChart to see the result of your COVID and flu test.  Call your primary doctor about any other concerns with these.    Return to the emergency department immediately over the next 6 to 24 hours for any worsening headache with nonstop vomiting, inability to walk, development of chest pain with shortness of breath, or any other concerns.   [FreeTextEntry3] : Skin examination performed of the face, neck, chest, hands, lower legs;\par The patient is well, alert and oriented, pleasant and cooperative.\par Eyelids, conjunctivae, oral mucosa, digits and nails all normal.  \par No cervical adenopathy.\par \par Erythematous scaling patches with inflammation and induration;  R medial lower leg, with varicosities;  \par similar, less extensive changes on Left\par \par ++ eczematous patches on back;\par \par healed surgical scar;  R lower cheek/jawline- mild hypertrophy; \par healed D&C scar  Left chest;\par \par + pronounced stasis changes/LDS on both lower legs; mild inflammation\par \par No lesions suspicious for malignancy.  235.8

## 2024-09-10 ENCOUNTER — APPOINTMENT (OUTPATIENT)
Dept: ORTHOPEDIC SURGERY | Facility: CLINIC | Age: 54
End: 2024-09-10
Payer: COMMERCIAL

## 2024-09-10 VITALS — HEIGHT: 65 IN | WEIGHT: 247 LBS | BODY MASS INDEX: 41.15 KG/M2

## 2024-09-10 DIAGNOSIS — S83.241A OTHER TEAR OF MEDIAL MENISCUS, CURRENT INJURY, RIGHT KNEE, INITIAL ENCOUNTER: ICD-10-CM

## 2024-09-10 PROCEDURE — 99214 OFFICE O/P EST MOD 30 MIN: CPT | Mod: 25

## 2024-09-10 PROCEDURE — 73564 X-RAY EXAM KNEE 4 OR MORE: CPT | Mod: RT

## 2024-09-10 PROCEDURE — 20610 DRAIN/INJ JOINT/BURSA W/O US: CPT | Mod: RT

## 2024-09-10 NOTE — PHYSICAL EXAM
[de-identified] : Patient is well nourished, well-developed, in no acute distress, with appropriate mood and affect. The patient is oriented to time, place, and person. Respirations are even and unlabored. Gait evaluation does not reveal a limp. There is no inguinal adenopathy. Examination of the contralateral knee shows normal range of motion, strength, no tenderness, and intact skin. The affected limb is well-perfused and showed 2+ dp/pt pulses, without skin lesions, shows a grossly normal motor and sensory examination. Knee motion is painless and the knee moves from 0 to 135 degrees. The knee is stable within that range-of-motion to AP and ML stress with a 1A Lachman, negative anterior or posterior drawer and no instability to varus or valgus stress. The alignment of the knee is 5 degrees varus. No effusion or crepitus is noted.  Tender to palpation of the medial joint line.  No tenderness to palpation about the lateral joint line, medial or lateral tibial plateau, medial or lateral femoral condyle, medial or lateral patellar facets, superior or inferior pole of the patella. Rober's is positive medially. Muscle strength is normal. Pedal pulses are palpable. Hip examination was negative. [de-identified] :  Long standing knee, AP knee, lateral knee, and patellar views of the right knee were ordered and taken in the office and demonstrate no evidence of degenerative joint disease of the knee, fractures, intra-articular pathology.

## 2024-09-10 NOTE — HISTORY OF PRESENT ILLNESS
[de-identified] : This is very nice 54-year-old female experiencing right knee pain, which is severe in intensity. History of right knee medial meniscus tear 2-3 years ago. Improved with nonsurgical management. Pain returned over past months with increasing activity. No instability. Cannot tolerate NSAIDs bc of Crohns disease.  No catching clicking locking.  Physical therapy has been helpful.  The pain substantially limits activities of daily living. Walking tolerance is reduced but improving. The patient denies any radiation of the pain to the feet and it is not associated with numbness, tingling, or weakness.

## 2024-09-10 NOTE — DISCUSSION/SUMMARY
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  An extensive discussion was conducted on the natural history of the disease and the variety of surgical and non-surgical options available to the patient including, but not limited to non-steroidal anti-inflammatory medications, steroid injections, physical therapy, maintenance of ideal body weight, and reduction of activity. I recommend mobic for this diagnosis but she cannot take NSAIDs so she will try tylenol.  Continue weight-bear as tolerated.  Use a knee sleeve. Today we performed a right knee intraarticular cortisone injection. PT rx. She can take Tylenol for pain. Weight loss recommended. The patient will schedule an appointment as needed.  Informed consent for the right knee injection was obtained. All risks, benefits and alternatives were discussed. These included but were not limited to bleeding, infection, and allergic reaction.  All questions were answered. The right knee was prepped and draped in sterile fashion. Using sterile technique, the right knee was injected with 80mg of Kenalog, 4cc of 1% lidocaine, 4cc of 0.25% marcaine using a 21-gauge needle. A sterile dressing was applied. Post injection instructions were reviewed. The patient tolerated the procedure well.

## 2024-09-20 NOTE — ED ADULT NURSE NOTE - NSFALLUNIVINTERV_ED_ALL_ED
Not Applicable
Bed/Stretcher in lowest position, wheels locked, appropriate side rails in place/Call bell, personal items and telephone in reach/Instruct patient to call for assistance before getting out of bed/chair/stretcher/Non-slip footwear applied when patient is off stretcher/[place] to call system/Physically safe environment - no spills, clutter or unnecessary equipment/Purposeful proactive rounding/Room/bathroom lighting operational, light cord in reach

## 2024-09-23 ENCOUNTER — APPOINTMENT (OUTPATIENT)
Dept: CARDIOLOGY | Facility: CLINIC | Age: 54
End: 2024-09-23
Payer: COMMERCIAL

## 2024-09-23 ENCOUNTER — NON-APPOINTMENT (OUTPATIENT)
Age: 54
End: 2024-09-23

## 2024-09-23 VITALS
BODY MASS INDEX: 40.98 KG/M2 | HEART RATE: 62 BPM | WEIGHT: 246 LBS | DIASTOLIC BLOOD PRESSURE: 86 MMHG | HEIGHT: 65 IN | TEMPERATURE: 97.9 F | OXYGEN SATURATION: 99 % | SYSTOLIC BLOOD PRESSURE: 130 MMHG | RESPIRATION RATE: 16 BRPM

## 2024-09-23 DIAGNOSIS — K52.9 NONINFECTIVE GASTROENTERITIS AND COLITIS, UNSPECIFIED: ICD-10-CM

## 2024-09-23 DIAGNOSIS — R93.1 ABNORMAL FINDINGS ON DIAGNOSTIC IMAGING OF HEART AND CORONARY CIRCULATION: ICD-10-CM

## 2024-09-23 DIAGNOSIS — I34.0 NONRHEUMATIC MITRAL (VALVE) INSUFFICIENCY: ICD-10-CM

## 2024-09-23 DIAGNOSIS — R01.1 CARDIAC MURMUR, UNSPECIFIED: ICD-10-CM

## 2024-09-23 DIAGNOSIS — I25.10 ATHEROSCLEROTIC HEART DISEASE OF NATIVE CORONARY ARTERY W/OUT ANGINA PECTORIS: ICD-10-CM

## 2024-09-23 DIAGNOSIS — E78.5 HYPERLIPIDEMIA, UNSPECIFIED: ICD-10-CM

## 2024-09-23 DIAGNOSIS — E66.9 OBESITY, UNSPECIFIED: ICD-10-CM

## 2024-09-23 DIAGNOSIS — Z86.79 PERSONAL HISTORY OF OTHER DISEASES OF THE CIRCULATORY SYSTEM: ICD-10-CM

## 2024-09-23 PROCEDURE — 93000 ELECTROCARDIOGRAM COMPLETE: CPT

## 2024-09-23 PROCEDURE — G2211 COMPLEX E/M VISIT ADD ON: CPT | Mod: NC

## 2024-09-23 PROCEDURE — 99214 OFFICE O/P EST MOD 30 MIN: CPT

## 2024-09-23 RX ORDER — ROSUVASTATIN CALCIUM 20 MG/1
20 TABLET, FILM COATED ORAL
Qty: 90 | Refills: 1 | Status: ACTIVE | COMMUNITY
Start: 2024-09-23 | End: 1900-01-01

## 2024-09-24 NOTE — ASSESSMENT
[FreeTextEntry1] : Prior note nurse practitioner Gregorio October 5, 2020::  This is a 50 year year old female here today for follow up cardiac followup evaluation.  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  Today she is feeling generally well  but does complain of dizziness and feeling faint when she has a flare up of her UC. Approximately 2 weeks ago she was in the ER at [facility] for feeling faint and was going to pass out. She received IVF and was sent home. She states that when this occurs she feels dizzy and gets a headache. She admits that she does not drink enough fluids because she is afraid that she will have to use the bathroom. I have asked her to follow up with her GI doctor Dr. Villareal. She stated that she was told to follow up with cardiology to r/o any vasovagal episodes.   -BP is well controlled in today's visit. She is not orthostatic at this time. Her last episode of dizziness/feeling faint was when she had a BM yesterday afternoon. She states that she has not had a severe episode since her visit to the ER.  -New blood work was done today to evaluate lipid profile, CBC, BMP, hepatic function, A1C and TSH.   -EKG done today which demonstrated regular sinus rhythm with nonspecific ST-T wave changes, BPM of 70. -We will order a 24 hour Holter monitor to evaluate for vasovagal events -We will order a 2 week ZIO patch to assess when these episodes occur during a BM. -She will follow up with her GI Dr. Villareal. -I have also asked the patient to follow up with her neurologist Dr. Mata which she states she has an appointment in the beginning of November. -The patient will schedule an Exercise Stress Test rule out significant coronary artery disease.  I have discussed the plan of care with Ms. SADIA MARTINEZ  and she  will follow up in 1 week. She is compliant with all of her  medications.  The patient understands that aerobic exercises must be increased to minutes 4 times/week and a detailed discussion of lifestyle modification was done today.  The patient has a good understanding of the diagnosis, treatment plan and lifestyle modification.  She will contact me at the office for any questions with their care or any changes in their health status.  The plan of care was discussed with supervising physician, Dr. Sevilla while present in the office at the time of the visit.   Radha KELSEY

## 2024-09-24 NOTE — DISCUSSION/SUMMARY
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 She denies palpitations, shortness of breath, dizziness or syncope. The patient does complain of dyspnea on exertion.  She feels that she is unable to walk for distance without shortness of breath.  She feels short of breath walking up hills.  She is limited in her exercise capacity and part of her symptoms may represent reduced aerobic capacity. However father had cardiac event in his 50s, and she is concerned about early atherosclerotic heart disease. Given the limitations and symptoms, I recommended she schedule coronary CTA to rule out significant coronary artery disease.  She will follow-up with me after that is completed. Electrocardiogram done September 23, 2024 demonstrated normal sinus rhythm rate 62 bpm is otherwise unremarkable. Coronary CTA done May 23, 2024 demonstrated calcium score of 10 units on the left anterior descending artery, and CTA analysis demonstrated normal left main artery, mixed plaque in the mid left anterior descending artery of less than 25% normal diagonal branches, normal left circumflex artery small nondominant normal right coronary artery. Given the presence of coronary artery calcification, prediabetes, and minimal mixed plaque in the mid left anterior descending artery, the patient's LDL target should be less than 70 mg/dL. Lipid panel done August 24, 2024 demonstrated a cholesterol of 210, HDL of 49, triglycerides 137, LDL calculated 137, non-HDL cholesterol 162 mg/dL and hemoglobin A1c of 6.2. I have recommended she start on rosuvastatin 20 mg daily for primary prevention.  She will have new blood work in 6 to 8 weeks. She also complains of bilateral pedal edema which may represent dependent edema secondary to venous insufficiency.  She also has some edema in her left wrist which is the only arm that is being used for her infusions for inflammatory bowel disease.  She was recently seen by Dr. Alfaro of vascular surgery who recommended a compression sleeve and elevation of her hand. Electrocardiogram done April 12, 2023 demonstrated normal sinus rhythm rate 78 bpm is otherwise unremarkable. Blood work done March 9, 2023 demonstrated cholesterol 202, HDL 45, triglycerides 127, LDL calculated 131, non-HDL cholesterol 157 mg/dL with hemoglobin A1c of 6.1.  Her vitamin D was low at that time of 6.8. She is currently hemodynamically stable and asymptomatic from cardiac standpoint. The patient receives Remicade infusions.  She had recent blood work done by her endocrinologist, and then subsequent blood work done by her gastroenterologist.  The subsequent blood work demonstrated a low calcium level of 7.1.  This needs to be repeated and we will do so this week.  Electrocardiogram done July 12, 2022 demonstrated normal sinus rhythm rate 78 bpm is otherwise unremarkable. Blood work done April 30, 2022 demonstrated hemoglobin A1c of 6.0, potassium of 4.5, cholesterol 205, HDL of 52, triglycerides 137, LDL calculated 126, non-HDL cholesterol 154 mg/dL. She will follow-up with her endocrinologist.  I feel that she is a good candidate for GLP-1 agonist therapy for better control of her prediabetes, and potential weight loss. The patient had a normal exercise stress test October 12, 2020. Electrocardiogram done August 26, 2020 demonstrates normal sinus rhythm rate 72 bpm is otherwise unremarkable.  PMH: The patient has active Crohn's disease and will be getting another infusion of Remicade this week.  I have asked her to increase her hydration specifically to have 1 bottle of Pedialyte per day for the next week.   The patient has been complaining of occasional dyspnea on exertion.  She was at a sweet 16 party, March 2020,  and started to dance, felt shortness of breath with limited exercise.  The patient understands that aerobic exercises must be increased to 40 minutes 4 times per week. A detailed discussion of lifestyle modification was done today. The patient has a good understanding of the diagnosis, and treatment plan. Lifestyle modification was also outlined. A detailed discussion of lifestyle modification was done today. The patient has a good understanding of the diagnosis, and treatment plan. Lifestyle modification was also outlined.

## 2024-09-24 NOTE — PHYSICAL EXAM
[General Appearance - Well Developed] : well developed [Normal Appearance] : normal appearance [Well Groomed] : well groomed [General Appearance - Well Nourished] : well nourished [No Deformities] : no deformities [General Appearance - In No Acute Distress] : no acute distress [Eyelids - No Xanthelasma] : the eyelids demonstrated no xanthelasmas [Normal Oral Mucosa] : normal oral mucosa [No Oral Pallor] : no oral pallor [No Oral Cyanosis] : no oral cyanosis [Normal Oropharynx] : normal oropharynx [Normal Jugular Venous A Waves Present] : normal jugular venous A waves present [Normal Jugular Venous V Waves Present] : normal jugular venous V waves present [No Jugular Venous Rodas A Waves] : no jugular venous rodas A waves [Respiration, Rhythm And Depth] : normal respiratory rhythm and effort [Exaggerated Use Of Accessory Muscles For Inspiration] : no accessory muscle use [Auscultation Breath Sounds / Voice Sounds] : lungs were clear to auscultation bilaterally [Bowel Sounds] : normal bowel sounds [Abdomen Soft] : soft [Abdomen Tenderness] : non-tender [Abnormal Walk] : normal gait [Gait - Sufficient For Exercise Testing] : the gait was sufficient for exercise testing [Nail Clubbing] : no clubbing of the fingernails [Cyanosis, Localized] : no localized cyanosis [Petechial Hemorrhages (___cm)] : no petechial hemorrhages [Skin Color & Pigmentation] : normal skin color and pigmentation [Skin Turgor] : normal skin turgor [] : no rash [No Venous Stasis] : no venous stasis [Skin Lesions] : no skin lesions [No Skin Ulcers] : no skin ulcer [No Xanthoma] : no  xanthoma was observed [Oriented To Time, Place, And Person] : oriented to person, place, and time [Impaired Insight] : insight and judgment were intact [Affect] : the affect was normal [Mood] : the mood was normal [No Anxiety] : not feeling anxious [I] : a grade 1 [___ +] : bilateral [unfilled]U+ pitting edema to the ankles [Well Developed] : well developed [Well Nourished] : well nourished [No Acute Distress] : no acute distress [Normal Conjunctiva] : normal conjunctiva [Normal Venous Pressure] : normal venous pressure [No Carotid Bruit] : no carotid bruit [Normal S1, S2] : normal S1, S2 [No Rub] : no rub [5th Left ICS - MCL] : palpated at the 5th LICS in the midclavicular line [Normal] : normal [No Precordial Heave] : no precordial heave was noted [Normal Rate] : normal [Rhythm Regular] : regular [Normal S1] : normal S1 [Normal S2] : normal S2 [No Gallop] : no gallop heard [II] : a grade 2 [No Pitting Edema] : no pitting edema present [2+] : left 2+ [No Abnormalities] : the abdominal aorta was not enlarged and no bruit was heard [Clear Lung Fields] : clear lung fields [Good Air Entry] : good air entry [No Respiratory Distress] : no respiratory distress  [Soft] : abdomen soft [Non Tender] : non-tender [No Masses/organomegaly] : no masses/organomegaly [Normal Bowel Sounds] : normal bowel sounds [Normal Gait] : normal gait [No Edema] : no edema [No Cyanosis] : no cyanosis [No Clubbing] : no clubbing [No Varicosities] : no varicosities [No Rash] : no rash [No Skin Lesions] : no skin lesions [Moves all extremities] : moves all extremities [No Focal Deficits] : no focal deficits [Normal Speech] : normal speech [Alert and Oriented] : alert and oriented [Normal memory] : normal memory [Apical Thrill] : no thrill palpable at the apex [S3] : no S3 [S4] : no S4 [Click] : no click [Pericardial Rub] : no pericardial rub [Rt] : no varicose veins of the right leg [Lt] : no varicose veins of the left leg [Right Carotid Bruit] : no bruit heard over the right carotid [Left Carotid Bruit] : no bruit heard over the left carotid [Right Femoral Bruit] : no bruit heard over the right femoral artery [Left Femoral Bruit] : no bruit heard over the left femoral artery

## 2024-09-24 NOTE — ASSESSMENT
[FreeTextEntry1] : Prior note nurse practitioner Gregorio October 5, 2020::  This is a 50 year year old female here today for follow up cardiac followup evaluation.  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  Today she is feeling generally well  but does complain of dizziness and feeling faint when she has a flare up of her UC. Approximately 2 weeks ago she was in the ER at [facility] for feeling faint and was going to pass out. She received IVF and was sent home. She states that when this occurs she feels dizzy and gets a headache. She admits that she does not drink enough fluids because she is afraid that she will have to use the bathroom. I have asked her to follow up with her GI doctor Dr. Villareal. She stated that she was told to follow up with cardiology to r/o any vasovagal episodes.   -BP is well controlled in today's visit. She is not orthostatic at this time. Her last episode of dizziness/feeling faint was when she had a BM yesterday afternoon. She states that she has not had a severe episode since her visit to the ER.  -New blood work was done today to evaluate lipid profile, CBC, BMP, hepatic function, A1C and TSH.   -EKG done today which demonstrated regular sinus rhythm with nonspecific ST-T wave changes, BPM of 70. -We will order a 24 hour Holter monitor to evaluate for vasovagal events -We will order a 2 week ZIO patch to assess when these episodes occur during a BM. -She will follow up with her GI Dr. Villareal. -I have also asked the patient to follow up with her neurologist Dr. Mata which she states she has an appointment in the beginning of November. -The patient will schedule an Exercise Stress Test rule out significant coronary artery disease.  I have discussed the plan of care with Ms. SADIA MARTINEZ  and she  will follow up in 1 week. She is compliant with all of her  medications.  The patient understands that aerobic exercises must be increased to minutes 4 times/week and a detailed discussion of lifestyle modification was done today.  The patient has a good understanding of the diagnosis, treatment plan and lifestyle modification.  She will contact me at the office for any questions with their care or any changes in their health status.  The plan of care was discussed with supervising physician, Dr. Sevilla while present in the office at the time of the visit.   Radha KELSEY

## 2024-10-15 ENCOUNTER — NON-APPOINTMENT (OUTPATIENT)
Age: 54
End: 2024-10-15

## 2024-10-21 DIAGNOSIS — I34.0 NONRHEUMATIC MITRAL (VALVE) INSUFFICIENCY: ICD-10-CM

## 2024-10-21 DIAGNOSIS — R42 DIZZINESS AND GIDDINESS: ICD-10-CM

## 2024-10-21 DIAGNOSIS — E78.5 HYPERLIPIDEMIA, UNSPECIFIED: ICD-10-CM

## 2024-10-23 ENCOUNTER — OUTPATIENT (OUTPATIENT)
Dept: OUTPATIENT SERVICES | Facility: HOSPITAL | Age: 54
LOS: 1 days | End: 2024-10-23
Payer: COMMERCIAL

## 2024-10-23 ENCOUNTER — APPOINTMENT (OUTPATIENT)
Dept: CT IMAGING | Facility: IMAGING CENTER | Age: 54
End: 2024-10-23
Payer: COMMERCIAL

## 2024-10-23 DIAGNOSIS — Z98.89 OTHER SPECIFIED POSTPROCEDURAL STATES: Chronic | ICD-10-CM

## 2024-10-23 DIAGNOSIS — Z86.39 PERSONAL HISTORY OF OTHER ENDOCRINE, NUTRITIONAL AND METABOLIC DISEASE: Chronic | ICD-10-CM

## 2024-10-23 DIAGNOSIS — Z00.8 ENCOUNTER FOR OTHER GENERAL EXAMINATION: ICD-10-CM

## 2024-10-23 DIAGNOSIS — I82.90 ACUTE EMBOLISM AND THROMBOSIS OF UNSPECIFIED VEIN: Chronic | ICD-10-CM

## 2024-10-23 PROCEDURE — 70496 CT ANGIOGRAPHY HEAD: CPT

## 2024-10-23 PROCEDURE — 70496 CT ANGIOGRAPHY HEAD: CPT | Mod: 26

## 2024-10-24 ENCOUNTER — APPOINTMENT (OUTPATIENT)
Dept: CARDIOLOGY | Facility: CLINIC | Age: 54
End: 2024-10-24

## 2024-10-24 ENCOUNTER — TRANSCRIPTION ENCOUNTER (OUTPATIENT)
Age: 54
End: 2024-10-24

## 2024-10-24 PROCEDURE — 93246 EXT ECG>7D<15D RECORDING: CPT

## 2024-10-24 PROCEDURE — 93306 TTE W/DOPPLER COMPLETE: CPT

## 2024-11-12 ENCOUNTER — APPOINTMENT (OUTPATIENT)
Dept: CARDIOLOGY | Facility: CLINIC | Age: 54
End: 2024-11-12

## 2024-11-12 PROCEDURE — 93248 EXT ECG>7D<15D REV&INTERPJ: CPT

## 2024-11-20 ENCOUNTER — NON-APPOINTMENT (OUTPATIENT)
Age: 54
End: 2024-11-20

## 2024-12-18 ENCOUNTER — APPOINTMENT (OUTPATIENT)
Dept: CARDIOLOGY | Facility: CLINIC | Age: 54
End: 2024-12-18

## 2024-12-18 VITALS
OXYGEN SATURATION: 100 % | TEMPERATURE: 97.9 F | SYSTOLIC BLOOD PRESSURE: 126 MMHG | RESPIRATION RATE: 16 BRPM | BODY MASS INDEX: 41.32 KG/M2 | HEART RATE: 86 BPM | DIASTOLIC BLOOD PRESSURE: 80 MMHG | HEIGHT: 65 IN | WEIGHT: 248 LBS

## 2024-12-18 DIAGNOSIS — R00.2 PALPITATIONS: ICD-10-CM

## 2024-12-18 DIAGNOSIS — Z86.79 PERSONAL HISTORY OF OTHER DISEASES OF THE CIRCULATORY SYSTEM: ICD-10-CM

## 2024-12-18 DIAGNOSIS — I25.10 ATHEROSCLEROTIC HEART DISEASE OF NATIVE CORONARY ARTERY W/OUT ANGINA PECTORIS: ICD-10-CM

## 2024-12-18 DIAGNOSIS — I34.0 NONRHEUMATIC MITRAL (VALVE) INSUFFICIENCY: ICD-10-CM

## 2024-12-18 DIAGNOSIS — E78.5 HYPERLIPIDEMIA, UNSPECIFIED: ICD-10-CM

## 2024-12-18 DIAGNOSIS — R93.1 ABNORMAL FINDINGS ON DIAGNOSTIC IMAGING OF HEART AND CORONARY CIRCULATION: ICD-10-CM

## 2024-12-18 PROCEDURE — 93000 ELECTROCARDIOGRAM COMPLETE: CPT

## 2024-12-18 PROCEDURE — G2211 COMPLEX E/M VISIT ADD ON: CPT | Mod: NC

## 2024-12-18 PROCEDURE — 99214 OFFICE O/P EST MOD 30 MIN: CPT

## 2025-02-04 NOTE — ASU PATIENT PROFILE, ADULT - PRO INTERPRETER NEED 2
Medication: lisinopril (ZESTRIL) 40 MG tablet  passed protocol.   Last office visit date: 7/2/2024  Next appointment scheduled?: No;       Number of refills given: 0          Medication: atorvastatin (LIPITOR) 40 MG tablet  passed protocol.   Last office visit date: 7/2/202  Next appointment scheduled?: No;       Number of refills given: 0     English

## 2025-02-14 ENCOUNTER — NON-APPOINTMENT (OUTPATIENT)
Age: 55
End: 2025-02-14

## 2025-02-14 ENCOUNTER — APPOINTMENT (OUTPATIENT)
Dept: CARDIOLOGY | Facility: CLINIC | Age: 55
End: 2025-02-14
Payer: COMMERCIAL

## 2025-02-14 VITALS
TEMPERATURE: 98 F | RESPIRATION RATE: 16 BRPM | BODY MASS INDEX: 40.48 KG/M2 | SYSTOLIC BLOOD PRESSURE: 127 MMHG | WEIGHT: 243 LBS | HEIGHT: 65 IN | HEART RATE: 69 BPM | OXYGEN SATURATION: 98 % | DIASTOLIC BLOOD PRESSURE: 79 MMHG

## 2025-02-14 DIAGNOSIS — I25.10 ATHEROSCLEROTIC HEART DISEASE OF NATIVE CORONARY ARTERY W/OUT ANGINA PECTORIS: ICD-10-CM

## 2025-02-14 DIAGNOSIS — R01.1 CARDIAC MURMUR, UNSPECIFIED: ICD-10-CM

## 2025-02-14 DIAGNOSIS — E78.5 HYPERLIPIDEMIA, UNSPECIFIED: ICD-10-CM

## 2025-02-14 PROCEDURE — 99214 OFFICE O/P EST MOD 30 MIN: CPT

## 2025-02-14 PROCEDURE — G2211 COMPLEX E/M VISIT ADD ON: CPT | Mod: NC

## 2025-02-14 PROCEDURE — 93000 ELECTROCARDIOGRAM COMPLETE: CPT

## 2025-03-11 ENCOUNTER — RX RENEWAL (OUTPATIENT)
Age: 55
End: 2025-03-11

## 2025-04-07 ENCOUNTER — APPOINTMENT (OUTPATIENT)
Dept: OBGYN | Facility: CLINIC | Age: 55
End: 2025-04-07
Payer: COMMERCIAL

## 2025-04-07 PROCEDURE — 99459 PELVIC EXAMINATION: CPT

## 2025-04-07 PROCEDURE — 99396 PREV VISIT EST AGE 40-64: CPT

## 2025-04-07 PROCEDURE — 36415 COLL VENOUS BLD VENIPUNCTURE: CPT

## 2025-05-01 PROBLEM — M25.552 LEFT HIP PAIN: Status: ACTIVE | Noted: 2025-05-01

## 2025-05-02 ENCOUNTER — APPOINTMENT (OUTPATIENT)
Dept: ORTHOPEDIC SURGERY | Facility: CLINIC | Age: 55
End: 2025-05-02

## 2025-05-02 ENCOUNTER — APPOINTMENT (OUTPATIENT)
Dept: ORTHOPEDIC SURGERY | Facility: CLINIC | Age: 55
End: 2025-05-02
Payer: COMMERCIAL

## 2025-05-02 VITALS — WEIGHT: 243 LBS | BODY MASS INDEX: 40.48 KG/M2 | HEIGHT: 65 IN

## 2025-05-02 DIAGNOSIS — M25.552 PAIN IN LEFT HIP: ICD-10-CM

## 2025-05-02 PROCEDURE — 73502 X-RAY EXAM HIP UNI 2-3 VIEWS: CPT

## 2025-05-02 PROCEDURE — 99203 OFFICE O/P NEW LOW 30 MIN: CPT

## 2025-05-02 RX ORDER — DICLOFENAC SODIUM 10 MG/G
1 GEL TOPICAL DAILY
Qty: 1 | Refills: 0 | Status: ACTIVE | COMMUNITY
Start: 2025-05-02 | End: 1900-01-01

## 2025-06-05 NOTE — ED PROVIDER NOTE - NSCAREINITIATED _GEN_ER
- Does not use CPAP    - EtC02 monitoring per protocol   - Monitor for signs or symptoms of hypercapnea   - Aggressive pulmonary toilet  - GERD/aspiration precautions     Gelacio Barroso(Resident)

## 2025-06-19 ENCOUNTER — APPOINTMENT (OUTPATIENT)
Dept: CARDIOLOGY | Facility: CLINIC | Age: 55
End: 2025-06-19

## 2025-08-08 ENCOUNTER — APPOINTMENT (OUTPATIENT)
Dept: ORTHOPEDIC SURGERY | Facility: CLINIC | Age: 55
End: 2025-08-08

## 2025-08-11 ENCOUNTER — APPOINTMENT (OUTPATIENT)
Dept: CARDIOLOGY | Facility: CLINIC | Age: 55
End: 2025-08-11
Payer: COMMERCIAL

## 2025-08-11 VITALS
SYSTOLIC BLOOD PRESSURE: 123 MMHG | BODY MASS INDEX: 41.15 KG/M2 | HEART RATE: 71 BPM | DIASTOLIC BLOOD PRESSURE: 76 MMHG | WEIGHT: 247 LBS | HEIGHT: 65 IN | TEMPERATURE: 98.1 F | RESPIRATION RATE: 16 BRPM | OXYGEN SATURATION: 98 %

## 2025-08-11 DIAGNOSIS — E78.5 HYPERLIPIDEMIA, UNSPECIFIED: ICD-10-CM

## 2025-08-11 DIAGNOSIS — I34.0 NONRHEUMATIC MITRAL (VALVE) INSUFFICIENCY: ICD-10-CM

## 2025-08-11 DIAGNOSIS — I25.10 ATHEROSCLEROTIC HEART DISEASE OF NATIVE CORONARY ARTERY W/OUT ANGINA PECTORIS: ICD-10-CM

## 2025-08-11 DIAGNOSIS — K52.9 NONINFECTIVE GASTROENTERITIS AND COLITIS, UNSPECIFIED: ICD-10-CM

## 2025-08-11 PROCEDURE — 93000 ELECTROCARDIOGRAM COMPLETE: CPT

## 2025-08-11 PROCEDURE — G2211 COMPLEX E/M VISIT ADD ON: CPT | Mod: NC

## 2025-08-11 PROCEDURE — 99214 OFFICE O/P EST MOD 30 MIN: CPT

## 2025-09-15 ENCOUNTER — APPOINTMENT (OUTPATIENT)
Dept: PODIATRY | Facility: CLINIC | Age: 55
End: 2025-09-15

## (undated) DEVICE — SUT VLOC 180 0 9" GS-21 GREEN

## (undated) DEVICE — APPLICATOR VISTASEAL LAP DUAL 35CM RIGID

## (undated) DEVICE — TROCAR COVIDIEN BLUNT TIP HASSAN 10MM

## (undated) DEVICE — WARMING BLANKET UPPER ADULT

## (undated) DEVICE — ENDOCATCH 10MM SPECIMEN POUCH

## (undated) DEVICE — PREP BETADINE SPONGE STICKS

## (undated) DEVICE — DRSG STERISTRIPS 0.5 X 4"

## (undated) DEVICE — BLADE SCALPEL SAFETYLOCK #10

## (undated) DEVICE — DRAPE MAYO STAND 30"

## (undated) DEVICE — SUT VLOC 180 0 18" GS-21 GREEN

## (undated) DEVICE — DRAPE TOWEL BLUE 17" X 24"

## (undated) DEVICE — SUT POLYSORB 0 30" GS-23

## (undated) DEVICE — VALVE YELLOW PORT SEAL PLUS 5MM

## (undated) DEVICE — ENDOCATCH II 15MM

## (undated) DEVICE — GOWN TRIMAX LG

## (undated) DEVICE — SPECIMEN CONTAINER 100ML

## (undated) DEVICE — BLADE SCALPEL SAFETYLOCK #15

## (undated) DEVICE — RUMI TIP BLUE 6.7MM X 8CM

## (undated) DEVICE — FOLEY TRAY 16FR 5CC LTX UMETER CLOSED

## (undated) DEVICE — UTERINE MANIPULATOR CLINICAL INNOVATIONS CLEARVIEW 7CM

## (undated) DEVICE — TROCAR COVIDIEN VERSASTEP PLUS 11MM STANDARD

## (undated) DEVICE — TROCAR GELPOINT MINI ADVANCED

## (undated) DEVICE — SUT BIOSYN 4-0 18" P-12

## (undated) DEVICE — DRAPE INSTRUMENT POUCH 6.75" X 11"

## (undated) DEVICE — NDL HYPO REGULAR BEVEL 22G X 1.5" (TURQUOISE)

## (undated) DEVICE — TROCAR APPLIED MEDICAL KII BALLOON BLUNT TIP 12MM X 100MM

## (undated) DEVICE — DRAPE LIGHT HANDLE COVER (BLUE)

## (undated) DEVICE — MEDICATION LABELS W MARKER

## (undated) DEVICE — SYR LUER LOK 10CC

## (undated) DEVICE — UTERINE MANIPULATOR COOPER SURGICAL 5MM 33CM GREEN

## (undated) DEVICE — SUT VLOC 180 0 12" GS-21 GREEN

## (undated) DEVICE — TUBING INSUFFLATION LAP FILTER 10FT

## (undated) DEVICE — SOL IRR POUR NS 0.9% 500ML

## (undated) DEVICE — DRSG DERMABOND 0.7ML

## (undated) DEVICE — ELCTR BOVIE PENCIL SMOKE EVACUATION

## (undated) DEVICE — GLV 7 PROTEXIS (WHITE)

## (undated) DEVICE — DRAPE 3/4 SHEET W REINFORCEMENT 56X77"

## (undated) DEVICE — GLV 6.5 PROTEXIS (WHITE)

## (undated) DEVICE — SOL IRR POUR H2O 250ML

## (undated) DEVICE — PACK GYN LAPAROSCOPY

## (undated) DEVICE — PREP CHLORAPREP HI-LITE ORANGE 26ML

## (undated) DEVICE — Device

## (undated) DEVICE — POSITIONER FOAM EGG CRATE ULNAR 2PCS (PINK)

## (undated) DEVICE — VENODYNE/SCD SLEEVE CALF LARGE

## (undated) DEVICE — MARKING PEN W RULER

## (undated) DEVICE — LIGASURE BLUNT TIP 37CM

## (undated) DEVICE — APPLICATOR FOR ARISTA XL 38CM

## (undated) DEVICE — TUBING STRYKEFLOW II SUCTION / IRRIGATOR

## (undated) DEVICE — INSUFFLATION NDL COVIDIEN STEP 14G FOR STEP/VERSASTEP

## (undated) DEVICE — GLV 7.5 PROTEXIS (WHITE)